# Patient Record
Sex: MALE | Employment: OTHER | URBAN - METROPOLITAN AREA
[De-identification: names, ages, dates, MRNs, and addresses within clinical notes are randomized per-mention and may not be internally consistent; named-entity substitution may affect disease eponyms.]

---

## 2017-03-22 ENCOUNTER — TELEPHONE (OUTPATIENT)
Dept: CARDIAC REHAB | Age: 82
End: 2017-03-22

## 2017-03-22 NOTE — TELEPHONE ENCOUNTER
Pulmonary Rehab called patient and spoke to wife about program. She's interested for her  and wants him in the program as soon as possible.     Thank you,  Sidney Sow

## 2017-05-01 ENCOUNTER — TELEPHONE (OUTPATIENT)
Dept: PULMONOLOGY | Age: 82
End: 2017-05-01

## 2017-05-04 ENCOUNTER — HOSPITAL ENCOUNTER (OUTPATIENT)
Dept: PULMONOLOGY | Age: 82
Discharge: HOME | End: 2017-05-04
Payer: MEDICARE

## 2017-05-04 PROCEDURE — G0424 PULMONARY REHAB W EXER: HCPCS

## 2017-05-09 ENCOUNTER — HOSPITAL ENCOUNTER (OUTPATIENT)
Dept: PULMONOLOGY | Age: 82
Discharge: HOME | End: 2017-05-09
Payer: MEDICARE

## 2017-05-09 PROCEDURE — G0424 PULMONARY REHAB W EXER: HCPCS

## 2017-05-09 NOTE — PROGRESS NOTES
[facility]  Outpatient Pulmonary Rehabilitation Flowsheet  [id number] [address] Serr[address] F Weeks  5/13/1929       Patient's carry-over of treatment delivered by: first treatment day    Objective Daily Findings    Comments:    Respiratory Muscle Functioning/Exercise Conditioning/Strengthening Session:    Time In: 8268  Time Out::0130  Session Number: 2  O2 with Theapy: 0 L/min    Pre SPO2 95  Pre HR:60  Pre BP: 98/60    RR: 15  Wt: not taken by pt  Temp: not taken by pt   Post SPO2: 95 Post HR: 57  Post BP: 114/63    Self Care Home Management Instruction/Education    Self Care Home Management Instruction Education: Breathing Retaining and COPD & Lung Disease. Patient's Response to Education: Patient actively participated in education and Able to demonstrate. Comments: Individual Therapy               Goal     Actual                      During Therapy                 Post-Therapy     % SpO2 HR RPD 1 - 4 RPE 6-20 Pain  1 - 10 % SpO2 HR RPD 1 - 4 RPE 6-20 Pain 0 - 10   Stretching/DB/  Monitoring 15 min 15 min 95 59 1 6 1 95 57 1 6 1   IS 1750 ml  25 min 9067-6562  25 min 94 57 1 7 1 95 57 1 6 1   IMT               Arm Bike 25 villatoro  25 min 15 villatoro  20 min 94 63 1 13 1 95 58 1 6 1   Weighted DB                 Patient's Response to Therapy: Increase fatique, Good effort and Good motivation.   Comments:    Time In: 0130     Session Number:3  Time Out: 0205     O2 with therapy: 0 L/min         Individual Therapy               Goal     Actual                      During Therapy           Post Therapy     % SpO2 HR RPD 1 - 4 RPE 6-20 Pain  1 - 10 % SpO2 HR RPD 1 - 4 RPE 6-20 Pain 0 - 10   Stepper L5  30 min L2-20 min  L1-5 min 95 68 1 10 4 94 62 1 6 2   Treadmill               Leg Bike               Stretching/DB/  Monitoring 10 min 10 min 94 62 1 6 1 95 57 1 6 1                       Patient's response to therapy: Increase fatique, Increase pain: Location: tail bone, Good effort and Good motivation  Comment:s      Assessment    Patient's response: Patient progressing towardsd LTGs evident by: Increased PLB.     Plan: Continue as written    Code     Billin units      Physician supervision provided this date of service by: Dr Betty Zarco     Therapist Signature: RT Susy    Therapist PRINTED Name and Credential: RT Susy    2017  1:07 PM

## 2017-05-11 ENCOUNTER — HOSPITAL ENCOUNTER (OUTPATIENT)
Dept: PULMONOLOGY | Age: 82
Discharge: HOME | End: 2017-05-11
Payer: MEDICARE

## 2017-05-11 PROCEDURE — G0424 PULMONARY REHAB W EXER: HCPCS

## 2017-05-11 NOTE — PROGRESS NOTES
DR[facility]  Outpatient Pulmonary Rehabilitation Flowsheet  4861 1227 [facility]  5/13/1929       Patient's carry-over of treatment delivered by: PLB while exercising with max cues. Objective Daily Findings    Comments:    Respiratory Muscle Functioning/Exercise Conditioning/Strengthening Session:    Time In: 1230  Time Out::0130  Session Number: 4  O2 with Theapy: 0 L/min    Pre SPO2 96  Pre HR:60  Pre BP: 103/58    RR: 14 Wt: 130  Temp: 97.2   Post SPO2: 96 Post HR: 57  Post BP: 95/53    Self Care Home Management Instruction/Education    Self Care Home Management Instruction Education: Breathing Retaining, Collaborative Self Managment and COPD & Lung Disease. Patient's Response to Education: Patient actively participated in education and Able to demonstrate. Comments: Individual Therapy               Goal     Actual                      During Therapy                 Post-Therapy     % SpO2 HR RPD 1 - 4 RPE 6-20 Pain  1 - 10 % SpO2 HR RPD 1 - 4 RPE 6-20 Pain 0 - 10   Stretching/DB/  Monitoring 10 min 10 min 95 69 1 6 1 95 62 1 6 1   IS 1750 ml  25 min 1567-1281  25 min 94 64 1 7 1 94 63 1 6 1   IMT               Arm Bike 25 villatoro  25 min 15 villatoro  25 min 95 75 1 14 1 95 69 1 6 1   Weighted DB                 Patient's Response to Therapy: Increase fatique, Good effort and Good motivation.   Comments:    Time In: 0130     Session Number:5  Time Out: 0205     O2 with therapy: 0 L/min         Individual Therapy               Goal     Actual                      During Therapy           Post Therapy     % SpO2 HR RPD 1 - 4 RPE 6-20 Pain  1 - 10 % SpO2 HR RPD 1 - 4 RPE 6-20 Pain 0 - 10   Stepper               Treadmill               Leg Bike L2  30 min L2-15 min  L1-15 min 93 82 2 15 2 96 61 1 6 1   Stretching/DB/  Monitoring 5 min 5 min 96 61 1 6 1 96 57 1 6 1                       Patient's response to therapy: Increase dyspnea, Increase fatique, Increase heart rate, Good effort and Good motivation  Comment:s      Assessment    Patient's response: Patient progressing towardsd LTGs evident by: Increased PLB.     Plan: Continue as written    Code     Billin units      Physician supervision provided this date of service by: Dr Danna Tariq     Therapist Signature: RT Mandy    Therapist PRINTED Name and Credential: RT Mandy    2017  8:53 AM

## 2017-05-16 ENCOUNTER — HOSPITAL ENCOUNTER (OUTPATIENT)
Dept: PULMONOLOGY | Age: 82
Discharge: HOME | End: 2017-05-16
Payer: MEDICARE

## 2017-05-16 ENCOUNTER — APPOINTMENT (OUTPATIENT)
Dept: PULMONOLOGY | Age: 82
End: 2017-05-16
Payer: MEDICARE

## 2017-05-16 PROCEDURE — G0424 PULMONARY REHAB W EXER: HCPCS

## 2017-05-16 NOTE — PROGRESS NOTES
[facility]  Outpatient Pulmonary Rehabilitation Flowsheet  [phone] [facility]  5/13/1929       Patient's carry-over of treatment delivered by: PLB while exercising with max cues. Objective Daily Findings    Comments:    Respiratory Muscle Functioning/Exercise Conditioning/Strengthening Session:    Time In: 1230  Time Out::0130  Session Number: 6  O2 with Theapy: 0 L/min    Pre SPO2 96  Pre HR:62  Pre BP: 98/59    RR: 14  Wt: 131  Temp: 97.0   Post SPO2: 95 Post HR: 60  Post BP: 106/52    Self Care Home Management Instruction/Education    Self Care Home Management Instruction Education: Breathing Retaining and Body Mechanics & Energy Conversation/Work Simplification (EC/WS) Technique. Patient's Response to Education: Patient actively participated in education and Able to demonstrate. Comments: Individual Therapy               Goal     Actual                      During Therapy                 Post-Therapy     % SpO2 HR RPD 1 - 4 RPE 6-20 Pain  1 - 10 % SpO2 HR RPD 1 - 4 RPE 6-20 Pain 0 - 10   Stretching/DB/  Monitoring 10 min 10 min 96 67 1 6 1 95 62 1 6 1   IS 1750 ml  25 min 1500 ml  25 min 94 63 1 7 1 95 63 1 6 1   IMT               Arm Bike 25 villatoro  25 min 15 villatoro  25 min 95 65 1 13 1 96 67 1 6 1   Weighted DB                 Patient's Response to Therapy: Increase fatique, Good effort and Good motivation.   Comments:    Time In: 0130     Session Number:7  Time Out: 0210     O2 with therapy: 0 L/min         Individual Therapy               Goal     Actual                      During Therapy           Post Therapy     % SpO2 HR RPD 1 - 4 RPE 6-20 Pain  1 - 10 % SpO2 HR RPD 1 - 4 RPE 6-20 Pain 0 - 10   Stepper               Treadmill               Leg Bike L2  30 min L2-20 min  L1-10 min 95 86 1 14 1 95 78 1 6 1   Stretching/DB/  Monitoring 10 min 10 min 95 78 1 6 1 95 60 1 6 1   Rest/PLB                    Patient's response to therapy: Increase fatique, Increase heart rate, Good effort and Good motivation  Comment:s      Assessment    Patient's response: Patient progressing towardsd LTGs evident by: Increased PLB and Improved Aerobic capicity and endurance.     Plan: Continue as written    Code     Billin units      Physician supervision provided this date of service by: Dr Lindsay Mckeon     Therapist Signature: Barton Ormond, RT    Therapist PRINTED Name and Credential: Barton Ormond, RT    2017  11:27 AM

## 2017-05-18 ENCOUNTER — APPOINTMENT (OUTPATIENT)
Dept: PULMONOLOGY | Age: 82
End: 2017-05-18
Payer: MEDICARE

## 2017-05-23 ENCOUNTER — HOSPITAL ENCOUNTER (OUTPATIENT)
Dept: PULMONOLOGY | Age: 82
Discharge: HOME | End: 2017-05-23
Payer: MEDICARE

## 2017-05-23 ENCOUNTER — APPOINTMENT (OUTPATIENT)
Dept: PULMONOLOGY | Age: 82
End: 2017-05-23
Payer: MEDICARE

## 2017-05-23 PROCEDURE — G0424 PULMONARY REHAB W EXER: HCPCS

## 2017-05-23 NOTE — PROGRESS NOTES
DR[facility]  Outpatient Pulmonary Rehabilitation Flowsheet  4580 1227 [facility]  5/13/1929       Patient's carry-over of treatment delivered by: PLB while exercising with max cues. Objective Daily Findings    Comments:    Respiratory Muscle Functioning/Exercise Conditioning/Strengthening Session:    Time In: 1230  Time Out::0130  Session Number: 8  O2 with Theapy: 0 L/min    Pre SPO2 96  Pre HR:59  Pre BP: 111/53    RR: 15  Wt: not taken by pt  Temp: not taken by pt   Post SPO2: 96 Post HR: 58  Post BP: 104/55    Self Care Home Management Instruction/Education    Self Care Home Management Instruction Education: Breathing Retaining, Exercise Concepts and Collaborative Self Managment. Patient's Response to Education: Patient actively participated in education and Able to demonstrate. Comments: Individual Therapy               Goal     Actual                      During Therapy                 Post-Therapy     % SpO2 HR RPD 1 - 4 RPE 6-20 Pain  1 - 10 % SpO2 HR RPD 1 - 4 RPE 6-20 Pain 0 - 10   Stretching/DB/  Monitoring 10 min 10 min 95 64 1 6 1 96 59 1 6 1   IS 1750 ml  25 min 1500 ml  25 min 95 63 1 7 1 96 61 1 6 1   IMT               Arm Bike 25 villatoro  25 min 15 villatoro  25 min 96 68 1 12 1 95 64 1 6 1   Weighted DB                 Patient's Response to Therapy: Increase fatique, Good effort and Good motivation.   Comments:    Time In: 0130     Session Number:9  Time Out: 0210     O2 with therapy: 0 L/min         Individual Therapy               Goal     Actual                      During Therapy           Post Therapy     % SpO2 HR RPD 1 - 4 RPE 6-20 Pain  1 - 10 % SpO2 HR RPD 1 - 4 RPE 6-20 Pain 0 - 10   Stepper L5  30 min L3  30 min 94 77 1 13 1 96 68 1 6 1   Treadmill               Leg Bike               Stretching/DB/  Monitoring 10 min 10 min 96 68 1 6 1 96 58 1 6 1                       Patient's response to therapy: Increase fatique, Increase heart rate, Good effort and Good motivation  Comment:s      Assessment    Patient's response: Patient progressing towardsd LTGs evident by: Increased PLB and Improved Aerobic capicity and endurance.     Plan: Continue as written    Code     Billin units      Physician supervision provided this date of service by: Dr Zaire Silver     Therapist Signature: RT Jarek    Therapist PRINTED Name and Credential: RT Jarek    2017  11:13 AM

## 2017-05-25 ENCOUNTER — APPOINTMENT (OUTPATIENT)
Dept: PULMONOLOGY | Age: 82
End: 2017-05-25
Payer: MEDICARE

## 2017-05-25 ENCOUNTER — HOSPITAL ENCOUNTER (OUTPATIENT)
Dept: PULMONOLOGY | Age: 82
Discharge: HOME | End: 2017-05-25
Payer: MEDICARE

## 2017-05-25 PROCEDURE — G0424 PULMONARY REHAB W EXER: HCPCS

## 2017-05-25 NOTE — PROGRESS NOTES
[facility]  Outpatient Pulmonary Rehabilitation Flowsheet  4292 1227 [facility]  5/13/1929       Patient's carry-over of treatment delivered by: PLB while exercising with max cues. Objective Daily Findings    Comments:    Respiratory Muscle Functioning/Exercise Conditioning/Strengthening Session:    Time In: 1230  Time Out::0130  Session Number: 10  O2 with Theapy: 0 L/min    Pre SPO2 95  Pre HR:60  Pre BP: 103/63    RR: 16  Wt: 130  Temp: 96.7   Post SPO2: 96 Post HR: 58  Post BP: 109/64    Self Care Home Management Instruction/Education    Self Care Home Management Instruction Education: Breathing Retaining. Patient's Response to Education: Patient actively participated in education and Able to demonstrate. Comments: Individual Therapy               Goal     Actual                      During Therapy                 Post-Therapy     % SpO2 HR RPD 1 - 4 RPE 6-20 Pain  1 - 10 % SpO2 HR RPD 1 - 4 RPE 6-20 Pain 0 - 10   Stretching/DB/  Monitoring 15 min 15 min 98 57 1 6 1 97 59 1 6 1   IS 1750 ml  25 min 1500 ml  20 min 96 63 1 7 1 95 61 1 6 1   IMT               Arm Bike 25 villatoro  25 min 20w-5  15w-20 96 68 1 15 1 98 57 1 6 1   Weighted DB                 Patient's Response to Therapy: Increase fatique, Good effort and Good motivation.   Comments:    Time In: 0130     Session Number:11  Time Out: 0205     O2 with therapy: 0 L/min         Individual Therapy               Goal     Actual                      During Therapy           Post Therapy     % SpO2 HR RPD 1 - 4 RPE 6-20 Pain  1 - 10 % SpO2 HR RPD 1 - 4 RPE 6-20 Pain 0 - 10   Stepper               Treadmill               Leg Bike L2  30 min L2-25 min  L1-5 min 93 87 1 12 1 95 61 1 6 1   Stretching/DB/  Monitoring 5 min 5 min 95 61 1 6 1 96 58 1 6 1                       Patient's response to therapy: Increase fatique and Increase Heart Rate, Good effort and Good motivation  Comment:s Assessment    Patient's response: Patient progressing towardsd LTGs evident by: Increased PLB and Improved Aerobic capicity and endurance.     Plan: Continue as written    Code     Billin units      Physician supervision provided this date of service by: Dr Madeleine Michelle     Therapist Signature: RT Jem    Therapist PRINTED Name and Credential: RT Jem    2017  10:19 AM

## 2017-05-30 ENCOUNTER — APPOINTMENT (OUTPATIENT)
Dept: PULMONOLOGY | Age: 82
End: 2017-05-30
Payer: MEDICARE

## 2017-05-30 ENCOUNTER — HOSPITAL ENCOUNTER (OUTPATIENT)
Dept: PULMONOLOGY | Age: 82
Discharge: HOME | End: 2017-05-30
Payer: MEDICARE

## 2017-05-30 PROCEDURE — G0424 PULMONARY REHAB W EXER: HCPCS

## 2017-05-30 NOTE — PROGRESS NOTES
[facility]  Outpatient Pulmonary Rehabilitation Flowsheet  2762 1227 [facility]  5/13/1929       Patient's carry-over of treatment delivered by: PLB while exercising with max cues. Objective Daily Findings    Comments:    Respiratory Muscle Functioning/Exercise Conditioning/Strengthening Session:    Time In: 1230  Time Out::0130  Session Number: 12  O2 with Theapy: 0 L/min    Pre SPO2 94  Pre HR:59  Pre BP: 118/67    RR: 16  Wt: not recorded by pt  Temp: 94.0   Post SPO2: 99 Post HR: 53  Post BP: 109/53    Self Care Home Management Instruction/Education    Self Care Home Management Instruction Education: Breathing Retaining, Body Mechanics & Energy Conversation/Work Simplification (EC/WS) Technique and COPD & Lung Disease. Patient's Response to Education: Patient actively participated in education and Able to demonstrate. Comments: Individual Therapy               Goal     Actual                      During Therapy                 Post-Therapy     % SpO2 HR RPD 1 - 4 RPE 6-20 Pain  1 - 10 % SpO2 HR RPD 1 - 4 RPE 6-20 Pain 0 - 10   Stretching/DB/  Monitoring 15 min 15 min 94 59 1 6 1 95 57 1 6 1   IS 1750 ml  20 min 2903-0994  20 min 95 60 1 7 1 95 59 1 6 1   IMT               Arm Bike 25 villatoro  25 min 20w-5  15w-20 97 62 2 14 1 94 59 1 6 1   Weighted DB                 Patient's Response to Therapy: Increase dyspnea, Increase fatique, Good effort and Good motivation.   Comments:    Time In: 0130     Session Number:13  Time Out: 0205     O2 with therapy: 0 L/min         Individual Therapy               Goal     Actual                      During Therapy           Post Therapy     % SpO2 HR RPD 1 - 4 RPE 6-20 Pain  1 - 10 % SpO2 HR RPD 1 - 4 RPE 6-20 Pain 0 - 10   Stepper L5  30 min L4-20 min  L3-10 min 98 55 1 10 1 99 53 1 6 1   Treadmill               Leg Bike               Stretching/DB/  Monitoring 5 min 5 min 99 53 1 6 1 99 53 1 6 1 Patient's response to therapy: Increase fatique, Good effort and Good motivation  Comment:s      Assessment    Patient's response: Patient progressing towardsd LTGs evident by: Improved Aerobic capicity and endurance.     Plan: Continue as written    Code     Billin units      Physician supervision provided this date of service by: Dr Pam Mills     Therapist Signature: RT Carl    Therapist PRINTED Name and Credential: RT Carl    2017  2:15 PM

## 2017-06-01 ENCOUNTER — HOSPITAL ENCOUNTER (OUTPATIENT)
Dept: PULMONOLOGY | Age: 82
Discharge: HOME | End: 2017-06-01
Payer: MEDICARE

## 2017-06-01 ENCOUNTER — APPOINTMENT (OUTPATIENT)
Dept: PULMONOLOGY | Age: 82
End: 2017-06-01
Payer: MEDICARE

## 2017-06-01 PROCEDURE — G0424 PULMONARY REHAB W EXER: HCPCS

## 2017-06-01 NOTE — PROGRESS NOTES
[facility]  Outpatient Pulmonary Rehabilitation Flowsheet  [id number] 2494 [facility]  5/13/1929       Patient's carry-over of treatment delivered by: Pt reports that he feels the program is good for him. .    Objective Daily Findings    Comments:    Respiratory Muscle Functioning/Exercise Conditioning/Strengthening Session:    Time In: 1230  Time Out::0130  Session Number: 14  O2 with Theapy: 0 L/min    Pre SPO2 96  Pre HR:63  Pre BP: 111/66    RR: 15  Wt: 130  Temp: 97.1   Post SPO2: 99 Post HR: 58  Post BP: 109/65    Self Care Home Management Instruction/Education    Self Care Home Management Instruction Education: Breathing Retaining and COPD & Lung Disease. Patient's Response to Education: Patient actively participated in education and Able to demonstrate. Comments: Individual Therapy               Goal     Actual                      During Therapy                 Post-Therapy     % SpO2 HR RPD 1 - 4 RPE 6-20 Pain  1 - 10 % SpO2 HR RPD 1 - 4 RPE 6-20 Pain 0 - 10   Stretching/DB/  Monitoring 15 min 15 min 95 61 1 6 1 94 62 1 6 1   IS 1750 ml  20 min 3381-1672  20 min 95 63 1 7 1 95 61 1 6 1   IMT               Arm Bike 25 villatoro  25 min 20w-10  15w-15 96 62 2 16 1 94 60 1 6 1   Weighted DB                 Patient's Response to Therapy: Increase dyspnea, Increase fatique, Good effort and Good motivation.   Comments:    Time In: 0130     Session Number:15  Time Out: 0205     O2 with therapy: 0 L/min         Individual Therapy               Goal     Actual                      During Therapy           Post Therapy     % SpO2 HR RPD 1 - 4 RPE 6-20 Pain  1 - 10 % SpO2 HR RPD 1 - 4 RPE 6-20 Pain 0 - 10   Stepper               Treadmill               Leg Bike L2  30 min L2-25 min  L1-5 min 93 83 1 13 1 97 65 1 6 1   Stretching/DB/  Monitoring 5 min 5 min 97 65 1 6 1 99 58 1 6 1                       Patient's response to therapy: Increase fatique, Good effort and Good motivation  Comment:s      Assessment    Patient's response: Patient progressing towardsd LTGs evident by: Improved Aerobic capicity and endurance.     Plan: Continue as written    Code     Billin units      Physician supervision provided this date of service by: Dr Karen Lobato     Therapist Signature: RT Nestor    Therapist PRINTED Name and Credential: RT Nestor    2017  10:56 AM

## 2017-06-06 ENCOUNTER — HOSPITAL ENCOUNTER (OUTPATIENT)
Dept: PULMONOLOGY | Age: 82
End: 2017-06-06
Payer: MEDICARE

## 2017-06-06 ENCOUNTER — APPOINTMENT (OUTPATIENT)
Dept: PULMONOLOGY | Age: 82
End: 2017-06-06
Payer: MEDICARE

## 2017-06-08 ENCOUNTER — APPOINTMENT (OUTPATIENT)
Dept: PULMONOLOGY | Age: 82
End: 2017-06-08
Payer: MEDICARE

## 2017-06-13 ENCOUNTER — HOSPITAL ENCOUNTER (OUTPATIENT)
Dept: PULMONOLOGY | Age: 82
Discharge: HOME | End: 2017-06-13
Payer: MEDICARE

## 2017-06-13 ENCOUNTER — APPOINTMENT (OUTPATIENT)
Dept: PULMONOLOGY | Age: 82
End: 2017-06-13
Payer: MEDICARE

## 2017-06-13 PROCEDURE — G0424 PULMONARY REHAB W EXER: HCPCS

## 2017-06-13 NOTE — PROGRESS NOTES
[facility]  Outpatient Pulmonary Rehabilitation Flowsheet  8522 1227 [facility]  5/13/1929       Patient's carry-over of treatment delivered by: Improved PLB while exercising. Objective Daily Findings    Comments:    Respiratory Muscle Functioning/Exercise Conditioning/Strengthening Session:    Time In: 1230  Time Out::0130  Session Number: 16  O2 with Theapy: 0 L/min    Pre SPO2 97  Pre HR:63  Pre BP: 109/69    RR: 16  Wt: 129  Temp: 97.4   Post SPO2: 97 Post HR: 64  Post BP: 94/55    Self Care Home Management Instruction/Education    Self Care Home Management Instruction Education: Breathing Retaining and Importance of Water. Patient's Response to Education: Patient actively participated in education and Able to demonstrate. Comments: Individual Therapy               Goal     Actual                      During Therapy                 Post-Therapy     % SpO2 HR RPD 1 - 4 RPE 6-20 Pain  1 - 10 % SpO2 HR RPD 1 - 4 RPE 6-20 Pain 0 - 10   Stretching/DB/  Monitoring 15 min 15 min 96 63 1 6 1 97 64 1 6 1   IS               IMT               Arm Bike 25 villatoro  25 min 20w-10  15w-20 94 62 1 13 1 95 64 1 6 1   Sit to Stands 15 min 15 min 93 63 1 13 1 94 63 1 6 1     Patient's Response to Therapy: Increase fatique, Good effort and Good motivation.   Comments:    Time In: 0130     Session Number:17  Time Out: 0205     O2 with therapy: 0 L/min         Individual Therapy               Goal     Actual                      During Therapy           Post Therapy     % SpO2 HR RPD 1 - 4 RPE 6-20 Pain  1 - 10 % SpO2 HR RPD 1 - 4 RPE 6-20 Pain 0 - 10   Stepper               Treadmill 1.5 mph  30 min 1.2 mph  30 min 92 79 1 11 1 96 72 1 6 1   Leg Bike               Stretching/DB/  Monitoring 5 min 5 min 96 72 1 6 1 95 65 1 6 1                       Patient's response to therapy: Increase fatique, Increase heart rate, Good effort and Good motivation  Comment:s Assessment    Patient's response: Patient progressing towardsd LTGs evident by: Increased PLB.     Plan: Continue as written    Code     Billin units      Physician supervision provided this date of service by: Dr Anahi Lucas     Therapist Signature: RT Mitzy    Therapist PRINTED Name and Credential: RT Mitzy    2017  12:27 PM

## 2017-06-15 ENCOUNTER — APPOINTMENT (OUTPATIENT)
Dept: PULMONOLOGY | Age: 82
End: 2017-06-15
Payer: MEDICARE

## 2017-06-15 ENCOUNTER — HOSPITAL ENCOUNTER (OUTPATIENT)
Dept: PULMONOLOGY | Age: 82
Discharge: HOME | End: 2017-06-15
Payer: MEDICARE

## 2017-06-15 PROCEDURE — G0424 PULMONARY REHAB W EXER: HCPCS

## 2017-06-15 NOTE — PROGRESS NOTES
DR[facility]  Outpatient Pulmonary Rehabilitation Flowsheet  8718 1227 [facility]  5/13/1929       Patient's carry-over of treatment delivered by: none noted today. Objective Daily Findings    Comments:    Respiratory Muscle Functioning/Exercise Conditioning/Strengthening Session:    Time In: 1230  Time Out::0130  Session Number: 18  O2 with Theapy: 0 L/min    Pre SPO2 98  Pre HR:64  Pre BP: 116/61    RR: 15  Wt: 130  Temp: 97.7   Post SPO2: 98 Post HR: 60  Post BP: 109/58    Self Care Home Management Instruction/Education    Self Care Home Management Instruction Education: Breathing Retaining and Body Mechanics & Energy Conversation/Work Simplification (EC/WS) Technique. Patient's Response to Education: Patient actively participated in education and Able to demonstrate. Comments: Individual Therapy               Goal     Actual                      During Therapy                 Post-Therapy     % SpO2 HR RPD 1 - 4 RPE 6-20 Pain  1 - 10 % SpO2 HR RPD 1 - 4 RPE 6-20 Pain 0 - 10   Stretching/DB/  Monitoring 10 min 10 min 94 64 1 6 1 95 65 1 6 1   IS 2000 ml  20 min 0935-2573  20 min 98 64 1 7 1 97 65 1 6 1   IMT               Arm Bike 25 villatoro  25 min 20w-15  15w-15 95 64 1 13 1 94 64 1 6 1   Weighted DB                 Patient's Response to Therapy: Increase fatique, Good effort and Good motivation.   Comments:    Time In: 0130     Session Number:19  Time Out: 0205     O2 with therapy: 0 L/min         Individual Therapy               Goal     Actual                      During Therapy           Post Therapy     % SpO2 HR RPD 1 - 4 RPE 6-20 Pain  1 - 10 % SpO2 HR RPD 1 - 4 RPE 6-20 Pain 0 - 10   Stepper               Treadmill               Leg Bike L3  30 min L3-15 min  L2-15 min 98 86 1 13 1 96 70 1 6 1   Stretching/DB/  Monitoring 5 min 5 min 96 70 1 6 1 98 60 1 6 1                       Patient's response to therapy: Increase fatique, Increase heart rate, Good effort and Good motivation  Comment:s      Assessment    Patient's response: Patient progressing towardsd LTGs evident by: Increased PLB and Improved Aerobic capicity and endurance.     Plan: Continue as written    Code     Billin units      Physician supervision provided this date of service by: Dr Coe Single     Therapist Signature: RT Christy    Therapist PRINTED Name and Credential: RT Christy    6/15/2017  9:42 AM

## 2017-06-20 ENCOUNTER — HOSPITAL ENCOUNTER (OUTPATIENT)
Dept: PULMONOLOGY | Age: 82
Discharge: HOME | End: 2017-06-20
Payer: MEDICARE

## 2017-06-20 ENCOUNTER — APPOINTMENT (OUTPATIENT)
Dept: PULMONOLOGY | Age: 82
End: 2017-06-20
Payer: MEDICARE

## 2017-06-20 PROCEDURE — G0424 PULMONARY REHAB W EXER: HCPCS

## 2017-06-20 NOTE — PROGRESS NOTES
[facility]  Outpatient Pulmonary Rehabilitation Flowsheet  [id number] Ayana Serrano F Weeks  5/13/1929       Patient's carry-over of treatment delivered by: none noted today    Objective Daily Findings    Comments:    Respiratory Muscle Functioning/Exercise Conditioning/Strengthening Session:    Time In: 1230  Time Out::0130  Session Number: 20  O2 with Theapy: 0 L/min    Pre SPO2 96  Pre HR:67  Pre BP: 112/62    RR: 15 Wt: 127  Temp: 97.3   Post SPO2: 97 Post HR: 58  Post BP: 104/57    Self Care Home Management Instruction/Education    Self Care Home Management Instruction Education: Breathing Retaining and Body Mechanics & Energy Conversation/Work Simplification (EC/WS) Technique. Patient's Response to Education: Patient actively participated in education and Able to demonstrate. Comments: Individual Therapy               Goal     Actual                      During Therapy                 Post-Therapy     % SpO2 HR RPD 1 - 4 RPE 6-20 Pain  1 - 10 % SpO2 HR RPD 1 - 4 RPE 6-20 Pain 0 - 10   Stretching/DB/  Monitoring 10 min 10 min 97 65 1 6 1 96 64 1 6 1   IS 2000 ml  20 min 7789-6947  20 min 96 67 1 7 1 97 65 1 6 1   IMT               Arm Bike 25 villatoro  25 min 20w-20  15w-10 96 81 1 13 1 97 65 1 6 1   Weighted DB                 Patient's Response to Therapy: Increase fatique, Increase heart rate, Good effort and Good motivation.   Comments:    Time In: 0130     Session Number:21  Time Out: 0205     O2 with therapy: 0 L/min         Individual Therapy               Goal     Actual                      During Therapy           Post Therapy     % SpO2 HR RPD 1 - 4 RPE 6-20 Pain  1 - 10 % SpO2 HR RPD 1 - 4 RPE 6-20 Pain 0 - 10   Stepper L5  30 min L5-15 min  L4-15 min 93 77 1 13 1 97 62 1 6 1   Treadmill               Leg Bike               Stretching/DB/  Monitoring 5 min 5 min 97 62 1 6 1 97 58 1 6 1                       Patient's response to therapy: Increase fatique, Increase heart rate, Good effort and Good motivation  Comment:s      Assessment    Patient's response: Patient progressing towardsd LTGs evident by: Increased Pacing and Improved Aerobic capicity and endurance.     Plan: Continue as written    Code     Billin units      Physician supervision provided this date of service by: Dr Melissa Posadas     Therapist Signature: RT Ruth    Therapist PRINTED Name and Credential: RT uRth    2017  4:25 PM

## 2017-06-21 ENCOUNTER — HOSPITAL ENCOUNTER (OUTPATIENT)
Dept: PHYSICAL THERAPY | Age: 82
Discharge: HOME | End: 2017-06-21
Payer: MEDICARE

## 2017-06-21 PROCEDURE — G8982 BODY POS GOAL STATUS: HCPCS

## 2017-06-21 PROCEDURE — 97110 THERAPEUTIC EXERCISES: CPT

## 2017-06-21 PROCEDURE — G8981 BODY POS CURRENT STATUS: HCPCS

## 2017-06-21 PROCEDURE — 97162 PT EVAL MOD COMPLEX 30 MIN: CPT

## 2017-06-21 NOTE — PROGRESS NOTES
PT DAILY TREATMENT NOTE - [facility] 3-16    Patient Name: Aubree Doran  Date:2017  : 1929  [x]  Patient  Verified  Payor: [place] MED[place]ARE / Plan: [place] MEDICARE PART A & B / Product Type: Medicare /    In time:12:20  Out time:1:00  Total Treatment Time (min): 40  Total Timed Codes (min): 15  1:1 Treatment Time ( only): 40   Visit #: 1 of 10    Treatment Area: Cervical pain [M54.2]    SUBJECTIVE  Pain Level (0-10 scale): 0/10  Any medication changes, allergies to medications, adverse drug reactions, diagnosis change, or new procedure performed?: [x] No    [] Yes (see summary sheet for update)  Subjective functional status/changes:   [x] See paper initial evaluation form in patient chart      OBJECTIVE    25 min [x]Eval                  []Re-Eval     15 min Therapeutic Exercise:  [] See flow sheet : HEP given and reviewed with Pt   Rationale: increase ROM to improve the patients ability to hold head in more erect position          With   [x] TE   [] TA   [] neuro   [] other: Patient Education: [x] Review HEP    [] Progressed/Changed HEP based on:   [] positioning   [] body mechanics   [] transfers   [] heat/ice application    [] other:      Other Objective/Functional Measures: FOTO 48 pts     Pain Level (0-10 scale) post treatment: 0/10    ASSESSMENT/Changes in Function:     Patient will continue to benefit from skilled PT services to address functional mobility deficits, address ROM deficits, analyze and address soft tissue restrictions, analyze and cue movement patterns, analyze and modify body mechanics/ergonomics, assess and modify postural abnormalities and instruct in home and community integration to attain remaining goals.      [x]  See Plan of Care         PLAN  []  Upgrade activities as tolerated     [x]  Continue plan of care  []  Update interventions per flow sheet       []  Discharge due to:_  []  Other:_      Kisha Rao, PT 2017  1:31 PM

## 2017-06-21 NOTE — PROGRESS NOTES
[facility] - [facility], [address] [place] [address]  [phone] fax  Plan of Care/ Statement of Necessity for Physical Therapy Services    Patient name: Courtney Cottrell Start of Care: 2017   Referral source: Krista Nash DO : 1929    Medical Diagnosis: Cervical pain [M54.2]   Onset Date:17    Treatment Diagnosis: Torticollis, Neck Pain   Prior Hospitalization: see medical history Provider#: [id number]   Medications: Verified on Patient summary List    Comorbidities: Heart disease; Arthirits; Osteoporosis; Thyroid problems; COPD; visually impaired - glasses; hearing impaired - hearing aids; hx of skin CA   Prior Level of Function: Lives with spouse; retired; manages household, yardwork with spouse      The Plan of Care and following information is based on the information from the initial evaluation. Assessment/ key information: Pt is a 80 y.o. male who presents with c/o intermittent neck pain and significant decrease in cervical mobility. Pt began noticing insidious onset of worsening neck AROM and increased pain > 6 months ago, prompting Pt to seek medical attention. Pt sits, stands with flexed FWD head and rounded shoulders. Pt holds head at 40 deg Flex and exhibits limited C/S AROM of Ext -5 deg from spinal neutral; SBR/SBL 15 deg; RR/RL 10 deg. Soft tissue restrictions noted in all cervical musculature and Pec Mjr. Full B shoulder AROM, strength noted. Pt would benefit from skilled PT to address above deficits to improve Pt's posture and function. Evaluation Complexity History HIGH Complexity :3+ comorbidities / personal factors will impact the outcome/ POC ; Examination MEDIUM Complexity : 3 Standardized tests and measures addressing body structure, function, activity limitation and / or participation in recreation  ;Presentation MEDIUM Complexity : Evolving with changing characteristics  ; Clinical Decision Making MEDIUM Complexity : FOTO score of 26-74  Overall Complexity Rating: MEDIUM  Problem List: pain affecting function, decrease ROM, decrease strength, decrease ADL/ functional abilitiies, decrease activity tolerance and decrease flexibility/ joint mobility   Treatment Plan may include any combination of the following: Therapeutic exercise, Therapeutic activities, Neuromuscular re-education, Physical agent/modality, Manual therapy and Patient education  Patient / Family readiness to learn indicated by: asking questions, trying to perform skills and interest  Persons(s) to be included in education: patient (P) and family support person (FSP);list spouse  Barriers to Learning/Limitations: yes;  sensory deficits-vision/hearing/speech and altered mental status (i.e.Sedation, Confusion)  Patient Goal (s): Increased range of motion; ability to lift head up farther.   Patient Self Reported Health Status: fair  Rehabilitation Potential: good    Short Term Goals: To be accomplished in 1 weeks:  Goal: Pt to be compliant with initial HEP to assist with improving head/neck posture. Status at last note/certification: Established    Long Term Goals: To be accomplished in 5 weeks:  Goal: Pt to increase C/S AROM all planes by at least 5 deg to achieve neutral head/neck posture. Status at last note/certification: Flex 40 deg; Ext -5 deg; SBR/SBL 15 deg; RR/RL 10 deg - all with head flexed at 40 deg  Goal: Pt to be able to sleep through the night on one pillow without increased pain to assist with neutral posture. Status at last note/certification: sleeps with two thick pillows, head fully flexed  Goal: Pt to report < 2/10 pain at worst to be able to look up or turn head with ease when performing daily tasks. Status at last note/certification: 7/71 pain at worst  Goal: Pt to report FOTO score of 60 pts to show improved posture, function.   Status at last note/certification: FOTO 48 pts at eval    Frequency / Duration: Patient to be seen 2 times per week for 5 weeks. Patient/ Caregiver education and instruction: Diagnosis, prognosis, exercises   [x]  Plan of care has been reviewed with СВЕТЛАНА    G-Codes (GP)  Position   Current  CK= 40-59%   Goal  CK= 40-59%    The severity rating is based on clinical judgment and the FOTO score. Certification Period: 6/21/17 - 7/20/17    Kisha Rao, PT 6/21/2017 1:33 PM  _____________________________________________________________________  I certify that the above Therapy Services are being furnished while the patient is under my care. I agree with the treatment plan and certify that this therapy is necessary.     Physician's Signature:____________________  Date:__________Time:______    Please sign and return to [facility] - [address]  [phone] fax

## 2017-06-22 ENCOUNTER — HOSPITAL ENCOUNTER (OUTPATIENT)
Dept: PULMONOLOGY | Age: 82
Discharge: HOME | End: 2017-06-22
Payer: MEDICARE

## 2017-06-22 ENCOUNTER — APPOINTMENT (OUTPATIENT)
Dept: PULMONOLOGY | Age: 82
End: 2017-06-22
Payer: MEDICARE

## 2017-06-22 PROCEDURE — G0424 PULMONARY REHAB W EXER: HCPCS

## 2017-06-22 NOTE — PROGRESS NOTES
[facility]  [phone] [address] DONOVAN Weeks  5/13/1929       Patient's carry-over of treatment delivered by: more pacing noted today    Objective Daily Findings    Comments:    Respiratory Muscle Functioning/Exercise Conditioning/Strengthening Session:    Time In: 1230  Time Out::0130  Session Number: 22  O2 with Theapy: 0 L/min    Pre SPO2 97  Pre HR:67  Pre BP: 94/56    RR: 15  Wt: 131  Temp: 97.9   Post SPO2: 96 Post HR: 59  Post BP: 97/58    Self Care Home Management Instruction/Education    Self Care Home Management Instruction Education: Breathing Retaining, Exercise Concepts and Body Mechanics & Energy Conversation/Work Simplification (EC/WS) Technique. Patient's Response to Education: Patient actively participated in education and Able to demonstrate. Comments: Individual Therapy               Goal     Actual                      During Therapy                 Post-Therapy     % SpO2 HR RPD 1 - 4 RPE 6-20 Pain  1 - 10 % SpO2 HR RPD 1 - 4 RPE 6-20 Pain 0 - 10   Stretching/DB/  Monitoring 10 min 10 min 96 73 1 6 1 96 65 1 6 1   IS 2000 ml  20 min 8613-1827  20 min 97 67 1 7 1 96 64 1 6 1   IMT               Arm Bike 25 villatoro  25 min 25w-5  20w-25 96 70 1 11 1 96 73 1 6 1   Weighted DB                 Patient's Response to Therapy: Increase fatique, Good effort and Good motivation.   Comments:    Time In: 0130     Session Number:23  Time Out: 0205     O2 with therapy: 0 L/min         Individual Therapy               Goal     Actual                      During Therapy           Post Therapy     % SpO2 HR RPD 1 - 4 RPE 6-20 Pain  1 - 10 % SpO2 HR RPD 1 - 4 RPE 6-20 Pain 0 - 10   Stepper               Treadmill 1.5 mph  30 min 1.3-20 min  1.2-10 min 96 81 1 11 1 96 71 1 6 1   Leg Bike               Stretching/DB/  Monitoring 5 min 5 min 96 71 1 6 1 96 65 1 6 1   Rest/PLB                    Patient's response to therapy: Increase fatique, Increase heart rate, Good effort and Good motivation  Comment:s      Assessment    Patient's response: Patient progressing towardsd LTGs evident by: Increased Pacing and Improved Aerobic capicity and endurance.     Plan: Continue as written    Code     Billin units      Physician supervision provided this date of service by: Dr Tomas Abreu     Therapist Signature: RT Emanuel    Therapist PRINTED Name and Credential: RT Emanuel    2017  7:58 AM

## 2017-06-27 ENCOUNTER — HOSPITAL ENCOUNTER (OUTPATIENT)
Dept: PULMONOLOGY | Age: 82
Discharge: HOME | End: 2017-06-27
Payer: MEDICARE

## 2017-06-27 ENCOUNTER — APPOINTMENT (OUTPATIENT)
Dept: PULMONOLOGY | Age: 82
End: 2017-06-27
Payer: MEDICARE

## 2017-06-27 PROCEDURE — G0424 PULMONARY REHAB W EXER: HCPCS

## 2017-06-27 NOTE — PROGRESS NOTES
[facility]  Outpatient Pulmonary Rehabilitation Flowsheet  [phone] [facility]  5/13/1929       Patient's carry-over of treatment delivered by: pt reports he always feels better after Rehab. Objective Daily Findings    Comments:    Respiratory Muscle Functioning/Exercise Conditioning/Strengthening Session:    Time In: 1230  Time Out::0130  Session Number: 24  O2 with Theapy: 0 L/min    Pre SPO2 99  Pre HR:60  Pre BP: 101/56    RR: 15  Wt: 130  Temp: 97.2   Post SPO2: 96 Post HR: 66  Post BP: 107/60    Self Care Home Management Instruction/Education    Self Care Home Management Instruction Education: Breathing Retaining and Body Mechanics & Energy Conversation/Work Simplification (EC/WS) Technique. Patient's Response to Education: Patient actively participated in education and Able to demonstrate. Comments: Individual Therapy               Goal     Actual                      During Therapy                 Post-Therapy     % SpO2 HR RPD 1 - 4 RPE 6-20 Pain  1 - 10 % SpO2 HR RPD 1 - 4 RPE 6-20 Pain 0 - 10   Stretching/DB/  Monitoring 10 min 10 min 96 71 1 6 1 97 64 1 6 1   IS 2000 ml  20 min 6232-9009  20 min 98 61 1 7 1 99 60 1 6 1   IMT               Arm Bike 25 villatoro  25 min 25w-10  20w-20 96 78 1 13 1 96 71 1 6 1   Weighted DB                 Patient's Response to Therapy: Increase fatique, Increase heart rate, Good effort and Good motivation.   Comments:    Time In: 0130     Session Number:25  Time Out: 0205     O2 with therapy: 0 L/min         Individual Therapy               Goal     Actual                      During Therapy           Post Therapy     % SpO2 HR RPD 1 - 4 RPE 6-20 Pain  1 - 10 % SpO2 HR RPD 1 - 4 RPE 6-20 Pain 0 - 10   Stepper               Treadmill               Leg Bike L3  30 min L2  30 min 95 85 2 14 1 96 72 1 9 1   Stretching/DB/  Monitoring 5 min 5 min 96 72 1 9 1 96 66 1 6 1   Rest/PLB                    Patient's response to therapy: Increase fatique, Increase heart rate, Good effort and Good motivation  Comment:s      Assessment    Patient's response: Patient progressing towardsd LTGs evident by: Improved Aerobic capicity and endurance.     Plan: Continue as written    Code     Billin units      Physician supervision provided this date of service by: Dr Vic Solis     Therapist Signature: RT Oliver    Therapist PRINTED Name and Credential: RT Oliver    2017  11:02 AM

## 2017-06-29 ENCOUNTER — HOSPITAL ENCOUNTER (OUTPATIENT)
Dept: PULMONOLOGY | Age: 82
Discharge: HOME | End: 2017-06-29
Payer: MEDICARE

## 2017-06-29 ENCOUNTER — APPOINTMENT (OUTPATIENT)
Dept: PULMONOLOGY | Age: 82
End: 2017-06-29
Payer: MEDICARE

## 2017-06-29 PROCEDURE — G0424 PULMONARY REHAB W EXER: HCPCS

## 2017-06-29 NOTE — PROGRESS NOTES
[facility]  Outpatient Pulmonary Rehabilitation Flowsheet  [id number] [facility]  5/13/1929       Patient's carry-over of treatment delivered by: pt continues to feel good after Rehab. Objective Daily Findings    Comments:    Respiratory Muscle Functioning/Exercise Conditioning/Strengthening Session:    Time In: 1230  Time Out::0130  Session Number: 26  O2 with Theapy: 0 L/min    Pre SPO2 97  Pre HR:70  Pre BP: 98/51    RR: 16  Wt: 127  Temp: 97.5   Post SPO2: 95 Post HR: 68  Post BP: 105/60    Self Care Home Management Instruction/Education    Self Care Home Management Instruction Education: Breathing Retaining and Pacing. Patient's Response to Education: Patient actively participated in education and Able to demonstrate. Comments: Individual Therapy               Goal     Actual                      During Therapy                 Post-Therapy     % SpO2 HR RPD 1 - 4 RPE 6-20 Pain  1 - 10 % SpO2 HR RPD 1 - 4 RPE 6-20 Pain 0 - 10   Stretching/DB/  Monitoring 10 min 10 min 97 74 1 6 1 97 70 1 6 1   IS 2000 ml  20 min 6577-6751  20 min 97 71 1 6 1 97 70 1 6 1   IMT               Arm Bike 25 villatoro  25 min 20w-25  15w-5 95 78 1 13 1 97 74 1 6 1   Weighted DB                 Patient's Response to Therapy: Increase fatique, Good effort and Good motivation.   Comments:    Time In: 0130     Session Number:27  Time Out: 0210     O2 with therapy: 0 L/min         Individual Therapy               Goal     Actual                      During Therapy           Post Therapy     % SpO2 HR RPD 1 - 4 RPE 6-20 Pain  1 - 10 % SpO2 HR RPD 1 - 4 RPE 6-20 Pain 0 - 10   Stepper               Treadmill 1.8 mph  30 min 1.7-10 min  1.5-10 min  1.2-15 min 94 74 1 11 1 95 70 1 6 1   Leg Bike               Stretching/DB/  Monitoring 5 min 5 min 95 70 1 6 1 95 68 1 6 1   Rest/PLB                    Patient's response to therapy: Increase fatique, Good effort and Good motivation  Comment:s      Assessment    Patient's response: Patient progressing towardsd LTGs evident by: Improved Aerobic capicity and endurance.     Plan: Continue as written    Code     Billin units      Physician supervision provided this date of service by: Dr Ada France     Therapist Signature: RT Inderjit    Therapist PRINTED Name and Credential: RT Inderjit    2017  1:12 PM

## 2017-07-04 ENCOUNTER — APPOINTMENT (OUTPATIENT)
Dept: PULMONOLOGY | Age: 82
End: 2017-07-04
Payer: MEDICARE

## 2017-07-06 ENCOUNTER — APPOINTMENT (OUTPATIENT)
Dept: PULMONOLOGY | Age: 82
End: 2017-07-06
Payer: MEDICARE

## 2017-07-11 ENCOUNTER — APPOINTMENT (OUTPATIENT)
Dept: PULMONOLOGY | Age: 82
End: 2017-07-11
Payer: MEDICARE

## 2017-07-11 ENCOUNTER — HOSPITAL ENCOUNTER (OUTPATIENT)
Dept: PHYSICAL THERAPY | Age: 82
Discharge: HOME | End: 2017-07-11
Payer: MEDICARE

## 2017-07-11 PROCEDURE — 97140 MANUAL THERAPY 1/> REGIONS: CPT

## 2017-07-11 PROCEDURE — 97110 THERAPEUTIC EXERCISES: CPT

## 2017-07-11 NOTE — PROGRESS NOTES
PT DAILY TREATMENT NOTE - [facility]     Patient Name: Maribel Race  Date:2017  : 1929  [x]  Patient  Verified  Payor: Silvio Font / Plan: [place] MEDICARE PART A & B / Product Type: Medicare /    In time:800  Out time:825  Total Treatment Time (min): 25  Total Timed Codes (min): 25  1:1 Treatment Time ( [address] only): 25   Visit #: 2 of 10    Treatment Area: Cervical pain [M54.2]    SUBJECTIVE  Pain Level (0-10 scale): 0  Any medication changes, allergies to medications, adverse drug reactions, diagnosis change, or new procedure performed?: [x] No    [] Yes (see summary sheet for update)  Subjective functional status/changes:   [] No changes reported  I was doing some work yesterday and I didn't get to prepare as much for today.      OBJECTIVE    Modality rationale: Patient declined   Min Type Additional Details    [] Estim:  []Unatt       []IFC  []Premod                        []Other:  []w/ice   []w/heat  Position:  Location:    [] Estim: []Att    []TENS instruct  []NMES                    []Other:  []w/US   []w/ice   []w/heat  Position:  Location:    []  Traction: [] Cervical       []Lumbar                       [] Prone          []Supine                       []Intermittent   []Continuous Lbs:  [] before manual  [] after manual    []  Ultrasound: []Continuous   [] Pulsed                           []1MHz   []3MHz W/cm2:  Location:    []  Iontophoresis with dexamethasone         Location: [] Take home patch   [] In clinic    []  Ice     []  heat  []  Ice massage  []  Laser   []  Anodyne Position:  Location:    []  Laser with stim  []  Other:  Position:  Location:    []  Vasopneumatic Device Pressure:       [] lo [] med [] hi   Temperature: [] lo [] med [] hi   [] Skin assessment post-treatment:  []intact []redness- no adverse reaction    []redness - adverse reaction:     10 min Therapeutic Exercise:  [x] See flow sheet :   Rationale: increase ROM and increase strength to improve the patients ability to perform daily tasks    15 min Manual Therapy:  Supine STM cervical paraspinals/Right UT/Right LS, gentle cervical manual stretching   Rationale: decrease pain, increase ROM, increase tissue extensibility and decrease trigger points to improve cervical mobility      With   [] TE   [] TA   [] neuro   [] other: Patient Education: [x] Review HEP    [] Progressed/Changed HEP based on:   [] positioning   [] body mechanics   [] transfers   [] heat/ice application    [] other:      Other Objective/Functional Measures: initiated treatment per flow sheet     Pain Level (0-10 scale) post treatment: 0    ASSESSMENT/Changes in Function: Tender to palpation in the Right cervical musculature. Reports noticeable stretching with supine chin tucks. No increase in ROM following manual techniques. Patient will continue to benefit from skilled PT services to modify and progress therapeutic interventions, address functional mobility deficits, address ROM deficits, address strength deficits, analyze and address soft tissue restrictions, analyze and cue movement patterns, analyze and modify body mechanics/ergonomics, assess and modify postural abnormalities, address imbalance/dizziness and instruct in home and community integration to attain remaining goals. []  See Plan of Care  []  See progress note/recertification  []  See Discharge Summary         Progress towards goals / Updated goals:  Short Term Goals: To be accomplished in 1 weeks:  Goal: Pt to be compliant with initial HEP to assist with improving head/neck posture. Status at last note/certification: Established  Current status: Met   Long Term Goals: To be accomplished in 5 weeks:  Goal: Pt to increase C/S AROM all planes by at least 5 deg to achieve neutral head/neck posture.   Status at last note/certification: Flex 40 deg; Ext -5 deg; SBR/SBL 15 deg; RR/RL 10 deg - all with head flexed at 40 deg  Goal: Pt to be able to sleep through the night on one pillow without increased pain to assist with neutral posture. Status at last note/certification: sleeps with two thick pillows, head fully flexed  Goal: Pt to report < 2/10 pain at worst to be able to look up or turn head with ease when performing daily tasks. Status at last note/certification: 6/03 pain at worst  Goal: Pt to report FOTO score of 60 pts to show improved posture, function.   Status at last note/certification: FOTO 48 pts at eval    PLAN  []  Upgrade activities as tolerated     [x]  Continue plan of care  []  Update interventions per flow sheet       []  Discharge due to:_  []  Other:_      Clem Dugan, PT 7/11/2017  7:13 AM    Future Appointments  Date Time Provider Saeed Tracy   7/11/2017 8:00 AM Clem Dugan PT [facility]   7/11/2017 12:30 PM [address] [facility]   7/13/2017 9:00 AM Clem Dugan PT [facility] [place] [facility]   7/13/2017 12:30 PM [address] 1 [facility]   7[facility]18/2017 9:30 AM Clem Dugan, PT [email or web address]   7/18/2017 12:30 PM [facility] PULM ROOM 1 [facility]   7/20/2017 9:00 AM Clem Dugan PT [facility] [place] [facility]   7/20/2017 12:30 PM [facility] PUL ROOM 1 [facility]   7/25/2017 8:00 AM Clem Dugan PT [facility] [place] [facility]   7/25/2017 12:30 PM [facility] PUL ROOM 1 [facility]   7/27/2017 9:00 AM Clem Velásquezmarta, PT [facility]   7/27/2017 12:30 PM [facility] PUL ROOM 1 [facility]   8/1/2017 12:30 PM [facility] PUL ROOM 1 [facility]   8/3/2017 12:30 PM [facility] PUL ROOM 1 [facility]   8/8/2017 12:30 PM [facility] PUL ROOM 1 [facility]   8/10/2017 12:30 PM [facility] PUL ROOM 1 [facility]   8/15/2017 12:30 PM [facility] PUL ROOM 1 [facility]   8/17/2017 12:30 PM [facility] PUL ROOM 1 [facility]

## 2017-07-12 ENCOUNTER — HOSPITAL ENCOUNTER (OUTPATIENT)
Dept: PHYSICAL THERAPY | Age: 82
Discharge: HOME | End: 2017-07-12
Payer: MEDICARE

## 2017-07-12 PROCEDURE — 97140 MANUAL THERAPY 1/> REGIONS: CPT

## 2017-07-12 NOTE — PROGRESS NOTES
PT DAILY TREATMENT NOTE - [facility] 3-    Patient Name: Yoseph Garcia  Date:2017  : 1929  [x]  Patient  Verified  Payor: [place] MED[place]ARE / Plan: [place] MEDICARE PART A & B / Product Type: Medicare /    In time:8:30  Out time:9:00  Total Treatment Time (min): 30  Total Timed Codes (min): 25 (waited for therapist 5 min)   1:1 Treatment Time ( [address] only): 15   Visit #: 3 of 10    Treatment Area: Cervical pain [M54.2]    SUBJECTIVE  Pain Level (0-10 scale): 0/10  Any medication changes, allergies to medications, adverse drug reactions, diagnosis change, or new procedure performed?: [x] No    [] Yes (see summary sheet for update)  Subjective functional status/changes:   [] No changes reported  Patient reported no pain when he is jsut sitting, but some discomfort when he attempts to move his neck. OBJECTIVE    10 min Therapeutic Exercise:  [x] See flow sheet :   Rationale: increase ROM and increase strength to improve the patients ability to perform ADLs     15 min Manual Therapy:  STM to (B) UT/Levator scap, cervical paraspinals (emphasis on (R) side) in supine and in seated   Rationale: decrease pain, increase ROM and increase tissue extensibility to increase ease with ADLs. With   [] TE   [] TA   [] neuro   [] other: Patient Education: [x] Review HEP    [] Progressed/Changed HEP based on:   [] positioning   [] body mechanics   [] transfers   [] heat/ice application    [] other:      Other Objective/Functional Measures:   Poor posture noted in sitting with rounded shoulders and forward head.       Pain Level (0-10 scale) post treatment: 0/10    ASSESSMENT/Changes in Function: Patient reported no pain at end of session and no discomfort with manual. Increased tightness noted at (R) UT/levator scap with manual.     Patient will continue to benefit from skilled PT services to modify and progress therapeutic interventions, address functional mobility deficits, address ROM deficits, address strength deficits, analyze and address soft tissue restrictions, analyze and cue movement patterns and assess and modify postural abnormalities to attain remaining goals. []  See Plan of Care  []  See progress note/recertification  []  See Discharge Summary         Progress towards goals / Updated goals:  Short Term Goals: To be accomplished in 1 weeks:  Goal: Pt to be compliant with initial HEP to assist with improving head/neck posture. Status at last note/certification: Established  Current status: Met   Long Term Goals: To be accomplished in 5 weeks:  Goal: Pt to increase C/S AROM all planes by at least 5 deg to achieve neutral head/neck posture. Status at last note/certification: Flex 40 deg; Ext -5 deg; SBR/SBL 15 deg; RR/RL 10 deg - all with head flexed at 40 deg  Goal: Pt to be able to sleep through the night on one pillow without increased pain to assist with neutral posture. Status at last note/certification: sleeps with two thick pillows, head fully flexed  Goal: Pt to report < 2/10 pain at worst to be able to look up or turn head with ease when performing daily tasks. Status at last note/certification: 5/59 pain at worst  Goal: Pt to report FOTO score of 60 pts to show improved posture, function.   Status at last note/certification: FOTO 48 pts at eval       PLAN  []  Upgrade activities as tolerated     [x]  Continue plan of care  []  Update interventions per flow sheet       []  Discharge due to:_  []  Other:_      Gary Casas, PT 7/12/2017  9:00 AM

## 2017-07-13 ENCOUNTER — APPOINTMENT (OUTPATIENT)
Dept: PULMONOLOGY | Age: 82
End: 2017-07-13
Payer: MEDICARE

## 2017-07-13 ENCOUNTER — APPOINTMENT (OUTPATIENT)
Dept: PHYSICAL THERAPY | Age: 82
End: 2017-07-13
Payer: MEDICARE

## 2017-07-18 ENCOUNTER — APPOINTMENT (OUTPATIENT)
Dept: PHYSICAL THERAPY | Age: 82
End: 2017-07-18
Payer: MEDICARE

## 2017-07-18 ENCOUNTER — APPOINTMENT (OUTPATIENT)
Dept: PULMONOLOGY | Age: 82
End: 2017-07-18
Payer: MEDICARE

## 2017-07-19 ENCOUNTER — HOSPITAL ENCOUNTER (OUTPATIENT)
Dept: PHYSICAL THERAPY | Age: 82
Discharge: HOME | End: 2017-07-19
Payer: MEDICARE

## 2017-07-19 PROCEDURE — 97110 THERAPEUTIC EXERCISES: CPT

## 2017-07-19 PROCEDURE — 97140 MANUAL THERAPY 1/> REGIONS: CPT

## 2017-07-19 NOTE — PROGRESS NOTES
PT DAILY TREATMENT NOTE - [facility] 3-16    Patient Name: Jason Unger  Date:2017  : 1929  [x]  Patient  Verified  Payor: Kary Ceron / Plan: [place] MEDICARE PART A & B / Product Type: Medicare /    In time:11:00  Out time:11:40  Total Treatment Time (min): 40  Total Timed Codes (min): 40   1:1 Treatment Time ( [address] only): 25  Visit #: 4 of 10    Treatment Area: Cervical pain [M54.2]    SUBJECTIVE  Pain Level (0-10 scale): 0/10  Any medication changes, allergies to medications, adverse drug reactions, diagnosis change, or new procedure performed?: [x] No    [] Yes (see summary sheet for update)  Subjective functional status/changes:   [] No changes reported  \"I don't have any pain right now but I do have pain when I move my head in any direction. \"     OBJECTIVE    Modality rationale: decrease pain and increase tissue extensibility to improve the patients ability to turn head without difficulty   Min Type Additional Details    [] Estim:  []Unatt       []IFC  []Premod                        []Other:  []w/ice   []w/heat  Position:  Location:    [] Estim: []Att    []TENS instruct  []NMES                    []Other:  []w/US   []w/ice   []w/heat  Position:  Location:    []  Traction: [] Cervical       []Lumbar                       [] Prone          []Supine                       []Intermittent   []Continuous Lbs:  [] before manual  [] after manual    []  Ultrasound: []Continuous   [] Pulsed                           []1MHz   []3MHz Location:  W/cm2:    []  Iontophoresis with dexamethasone         Location: [] Take home patch   [] In clinic   10 []  Ice     [x]  heat  []  Ice massage  []  Laser   []  Anodyne Position: supine  Location: neck    []  Laser with stim  []  Other: Position:  Location:    []  Vasopneumatic Device Pressure:       [] lo [] med [] hi   Temperature: [] lo [] med [] hi   [] Skin assessment post-treatment:  []intact []redness- no adverse reaction    []redness - adverse reaction:     15 min Therapeutic Exercise:  [x] See flow sheet :   Rationale: increase ROM and increase strength to improve the patients ability to perform ADLs     15 min Manual Therapy:  Gentle SOR; STM to B UT, C/S paraspinals   Rationale: decrease pain, increase ROM and increase tissue extensibility to increase ease with ADLs. With   [] TE   [] TA   [] neuro   [] other: Patient Education: [x] Review HEP    [] Progressed/Changed HEP based on:   [] positioning   [] body mechanics   [] transfers   [] heat/ice application    [] other:      Other Objective/Functional Measures: Continued with current program.  Pt continues to sit with forward head posture and flexed trunk posture. Pain Level (0-10 scale) post treatment: 1/10    ASSESSMENT/Changes in Function: Reassessed goals. Pt continues to report pain on R side of neck with turning head or moving it in any direction. Patient will continue to benefit from skilled PT services to modify and progress therapeutic interventions, address functional mobility deficits, address ROM deficits, address strength deficits, analyze and address soft tissue restrictions, analyze and cue movement patterns and assess and modify postural abnormalities to attain remaining goals. []  See Plan of Care  [x]  See progress note/recertification  []  See Discharge Summary         Progress towards goals / Updated goals:  Short Term Goals: To be accomplished in 1 weeks:  Goal: Pt to be compliant with initial HEP to assist with improving head/neck posture. Status at last note/certification: Established  Current status: Met   Long Term Goals: To be accomplished in 5 weeks:  Goal: Pt to increase C/S AROM all planes by at least 5 deg to achieve neutral head/neck posture.   Status at last note/certification: Flex 40 deg; Ext -5 deg; SBR/SBL 15 deg; RR/RL 10 deg - all with head flexed at 40 deg  Current status: progressing - Flex 40 deg; Ext -5 deg; SBR 15 deg; SBL 22 deg; RR 20 deg; RL 30 deg  Goal: Pt to be able to sleep through the night on one pillow without increased pain to assist with neutral posture. Status at last note/certification: sleeps with two thick pillows, head fully flexed  Current status: progressing - sleeps with one pillow folded in half  Goal: Pt to report < 2/10 pain at worst to be able to look up or turn head with ease when performing daily tasks. Status at last note/certification: 9/54 pain at worst  Current status: not met - 4/10 pain at worst  Goal: Pt to report FOTO score of 60 pts to show improved posture, function.   Status at last note/certification: FOTO 48 pts at eval  Current status: reassess visit 5     PLAN  []  Upgrade activities as tolerated     [x]  Continue plan of care  []  Update interventions per flow sheet       []  Discharge due to:_  []  Other:_      Kisha Rao, PT 7/19/2017  9:00 AM

## 2017-07-20 ENCOUNTER — APPOINTMENT (OUTPATIENT)
Dept: PULMONOLOGY | Age: 82
End: 2017-07-20
Payer: MEDICARE

## 2017-07-20 ENCOUNTER — HOSPITAL ENCOUNTER (OUTPATIENT)
Dept: PULMONOLOGY | Age: 82
Discharge: HOME | End: 2017-07-20
Payer: MEDICARE

## 2017-07-20 ENCOUNTER — APPOINTMENT (OUTPATIENT)
Dept: PHYSICAL THERAPY | Age: 82
End: 2017-07-20
Payer: MEDICARE

## 2017-07-20 PROCEDURE — G0424 PULMONARY REHAB W EXER: HCPCS

## 2017-07-20 NOTE — PROGRESS NOTES
[facility]  Outpatient Pulmonary Rehabilitation Flowsheet  [id number] [facility]  5/13/1929       Patient's carry-over of treatment delivered by: pt reports he walked a lot while out of town. Objective Daily Findings    Comments:    Respiratory Muscle Functioning/Exercise Conditioning/Strengthening Session:    Time In: 1230  Time Out::0130  Session Number: 28  O2 with Theapy: 0 L/min    Pre SPO2 98  Pre HR:63  Pre BP: 98/51    RR: 15  Wt: 129  Temp: 97.5   Post SPO2: 96 Post HR: 69  Post BP: 112/59    Self Care Home Management Instruction/Education    Self Care Home Management Instruction Education: Breathing Retaining and Body Mechanics & Energy Conversation/Work Simplification (EC/WS) Technique. Patient's Response to Education: Patient actively participated in education and Able to demonstrate. Comments: Individual Therapy               Goal     Actual                      During Therapy                 Post-Therapy     % SpO2 HR RPD 1 - 4 RPE 6-20 Pain  1 - 10 % SpO2 HR RPD 1 - 4 RPE 6-20 Pain 0 - 10   Stretching/DB/  Monitoring 10 min 10 min 96 75 1 6 1 96 71 1 6 1   IS 2000 ml  20 min [id number]  20 min 97 65 1 6 1 98 63 1 6 1   IMT               Arm Bike 25 villatoro  30 min 20w-15  15w-15 94 91 1 14 1 96 75 1 6 1   Weighted DB                 Patient's Response to Therapy: Increase fatique, Increase heart rate, Good effort and Good motivation.   Comments:    Time In: 0130     Session Number:29  Time Out: 0205     O2 with therapy: 0 L/min         Individual Therapy               Goal     Actual                      During Therapy           Post Therapy     % SpO2 HR RPD 1 - 4 RPE 6-20 Pain  1 - 10 % SpO2 HR RPD 1 - 4 RPE 6-20 Pain 0 - 10   Stepper               Treadmill               Leg Bike L3  30 min L1  25 min 93 94 1 13 1 96 72 1 9 1   Stretching/DB/  Monitoring 10 min 10 min 96 72 1 9 1 96 69 1 6 1   Rest/PLB                    Patient's response to therapy: Increase fatique, Increase heart rate, Good effort and Good motivation  Comment:s      Assessment    Patient's response: Patient progressing towardsd LTGs evident by: Increased PLB.     Plan: Continue as written    Code     Billin units      Physician supervision provided this date of service by: Dr Kassi Marshall     Therapist Signature: RT Dom    Therapist PRINTED Name and Credential: RT Dom    2017  12:36 PM

## 2017-07-24 ENCOUNTER — HOSPITAL ENCOUNTER (OUTPATIENT)
Dept: PHYSICAL THERAPY | Age: 82
Discharge: HOME | End: 2017-07-24
Payer: MEDICARE

## 2017-07-24 PROCEDURE — G8981 BODY POS CURRENT STATUS: HCPCS

## 2017-07-24 PROCEDURE — 97140 MANUAL THERAPY 1/> REGIONS: CPT

## 2017-07-24 PROCEDURE — 97110 THERAPEUTIC EXERCISES: CPT

## 2017-07-24 PROCEDURE — G8982 BODY POS GOAL STATUS: HCPCS

## 2017-07-24 NOTE — PROGRESS NOTES
[facility] - [facility], [address] [place] [address]  [phone] fax    Continued Plan of Care/ Re-certification for Physical Therapy Services      Patient name: Ella Granado Start of Care: 2017   Referral source: Reymundo Miller DO : 1929                         Medical Diagnosis: Cervical pain [M54.2] Onset Date:17                         Treatment Diagnosis: Torticollis, Neck Pain   Prior Hospitalization: see medical history Provider#: [id number]   Medications: Verified on Patient summary List    Comorbidities: Heart disease; Arthirits; Osteoporosis; Thyroid problems; COPD; visually impaired - glasses; hearing impaired - hearing aids; hx of skin CA   Prior Level of Function: Lives with spouse; retired; manages household, yardwork with spouse    Visits from [employer] of Care: 5    Missed Visits: 1    The Plan of Care and following information is based on the patient's current status:  Short Term Goals: To be accomplished in 1 weeks:  Goal: Pt to be compliant with initial HEP to assist with improving head/neck posture. Status at last note/certification: Established  Current status: Met   Long Term Goals: To be accomplished in 5 weeks:  Goal: Pt to increase C/S AROM all planes by at least 5 deg to achieve neutral head/neck posture. Status at last note/certification: Flex 40 deg; Ext -5 deg; SBR/SBL 15 deg; RR/RL 10 deg - all with head flexed at 40 deg  Current status: progressing - Flex 40 deg; Ext to neutral with effort; SBR 15 deg; SBL 22 deg; RR 20 deg; RL 30 deg  Goal: Pt to be able to sleep through the night on one pillow without increased pain to assist with neutral posture.   Status at last note/certification: sleeps with two thick pillows, head fully flexed  Current status: progressing - sleeps with one pillow folded in half  Goal: Pt to report < 2/10 pain at worst to be able to look up or turn head with ease when performing daily tasks.  Status at last note/certification:  pain at worst  Current status: not met - 4/10 pain at worst  Goal: Pt to report FOTO score of 60 pts to show improved posture, function. Status at last note/certification: FOTO 48 pts at eval  Current status: progressing- 58     Key functional changes:   Functional Gains: able to participate in general conditioning program, pain is not terrible  Functional Deficits: limited cervical ROM, poor postural awareness   % improvement: 50%  Pain   Average: 0/10       Best: 0/10     Worst: 10  Patient Goal: \"Increased ROM, ability to lift my head up\"      Problems/ barriers to goal attainment: none     Problem List: pain affecting function, decrease ROM, decrease strength, edema affecting function, impaired gait/ balance, decrease ADL/ functional abilitiies, decrease activity tolerance, decrease flexibility/ joint mobility and decrease transfer abilities    Treatment Plan: Therapeutic exercise, Therapeutic activities, Neuromuscular re-education, Physical agent/modality, Gait/balance training, Manual therapy, Aquatic therapy, Patient education, Self Care training, Functional mobility training, Home safety training and Stair training     Goals for this certification period to be accomplished in 5 weeks:  Long term goals remain appropriate    Frequency / Duration: Patient to be seen 2 times per week for 5 weeks:    Assessment / Recommendations:Patient has attended a few appointments for neck pain over the past month (was out of town for a  recently). Patient is able to hold head in a more neutral posture, but for only a short amount of time before having soreness/fatigue. Recommend continuing skilled physical therapy on a more consistent basis in order to improve cervical mobility through gentle exercises and manual techniques.      G-Codes (GP)  [id number] Current  CK= 40-59%  [id number] Goal  CK= 40-59%    The severity rating is based on clinical judgment and the [facility] score.    Certification Period: 7/22/17 to 8/20/17    Helena Gibbs, PT 7/24/2017 10:56 AM    ________________________________________________________________________  I certify that the above Therapy Services are being furnished while the patient is under my care. I agree with the treatment plan and certify that this therapy is necessary. [] I have read the above and request that my patient continue as recommended.   [] I have read the above report and request that my patient continue therapy with the following changes/special instructions: ______________________________________  [] I have read the above report and request that my patient be discharged from therapy    Physician's Signature:_______________________________Date:___________Time:__________  Please sign and return to [facility] - [address]  [phone] fax

## 2017-07-24 NOTE — PROGRESS NOTES
PT DAILY TREATMENT NOTE - [facility] 3-16    Patient Name: Amber Miller  Date:2017  : 1929  [x]  Patient  Verified  Payor: Christofer Landa / Plan: [place] MEDICARE PART A & B / Product Type: Medicare /    In time:1058  Out time:1130  Total Treatment Time (min): 32  Total Timed Codes (min): 32  1:1 Treatment Time ( [address] only): 32   Visit #: 1 of 10    Treatment Area: Cervical pain [M54.2]    SUBJECTIVE  Pain Level (0-10 scale): 0  Any medication changes, allergies to medications, adverse drug reactions, diagnosis change, or new procedure performed?: [x] No    [] Yes (see summary sheet for update)  Subjective functional status/changes:   [] No changes reported  I just got back from a long trip.     OBJECTIVE    Modality rationale: Patient declined   Min Type Additional Details    [] Estim:  []Unatt       []IFC  []Premod                        []Other:  []w/ice   []w/heat  Position:  Location:    [] Estim: []Att    []TENS instruct  []NMES                    []Other:  []w/US   []w/ice   []w/heat  Position:  Location:    []  Traction: [] Cervical       []Lumbar                       [] Prone          []Supine                       []Intermittent   []Continuous Lbs:  [] before manual  [] after manual    []  Ultrasound: []Continuous   [] Pulsed                           []1MHz   []3MHz Location:  W/cm2:    []  Iontophoresis with dexamethasone         Location: [] Take home patch   [] In clinic    []  Ice     []  heat  []  Ice massage  []  Laser   []  Anodyne Position:  Location:    []  Laser with stim  []  Other: Position:  Location:    []  Vasopneumatic Device Pressure:       [] lo [] med [] hi   Temperature: [] lo [] med [] hi   [] Skin assessment post-treatment:  []intact []redness- no adverse reaction    []redness - adverse reaction:     17 min Therapeutic Exercise:  [x] See flow sheet :   Rationale: increase ROM and increase strength to improve the patients ability to improve ease of driving, ADL    15 min Manual Therapy:  Gentle SOR, gentle cervical manual stretching/distraction, STM Bilateral UT/scalenes/cervical paraspinals   Rationale: decrease pain, increase ROM, increase tissue extensibility and decrease trigger points to improve cervical mobility/posture      With   [] TE   [] TA   [] neuro   [] other: Patient Education: [x] Review HEP    [] Progressed/Changed HEP based on:   [] positioning   [] body mechanics   [] transfers   [] heat/ice application    [] other:      Other Objective/Functional Measures: see PN     Pain Level (0-10 scale) post treatment: 0    ASSESSMENT/Changes in Function: Patient is able to hold his neck in about neutral flexion/extension but with soreness, significantly decreased endurance. Patient will continue to benefit from skilled PT services to modify and progress therapeutic interventions, address functional mobility deficits, address ROM deficits, address strength deficits, analyze and address soft tissue restrictions, analyze and cue movement patterns, analyze and modify body mechanics/ergonomics, assess and modify postural abnormalities, address imbalance/dizziness and instruct in home and community integration to attain remaining goals. []  See Plan of Care  [x]  See progress note/recertification  []  See Discharge Summary         Progress towards goals / Updated goals:  Short Term Goals: To be accomplished in 1 weeks:  Goal: Pt to be compliant with initial HEP to assist with improving head/neck posture. Status at last note/certification: Established  Current status: Met   Long Term Goals: To be accomplished in 5 weeks:  Goal: Pt to increase C/S AROM all planes by at least 5 deg to achieve neutral head/neck posture. Status at last note/certification: Flex 40 deg; Ext -5 deg; SBR/SBL 15 deg; RR/RL 10 deg - all with head flexed at 40 deg  Current status: progressing - Flex 40 deg;  Ext to about neutral with effor; SBR 15 deg; SBL 22 deg; RR 20 deg; RL 30 deg  Goal: Pt to be able to sleep through the night on one pillow without increased pain to assist with neutral posture. Status at last note/certification: sleeps with two thick pillows, head fully flexed  Current status: progressing - sleeps with one pillow folded in half  Goal: Pt to report < 2/10 pain at worst to be able to look up or turn head with ease when performing daily tasks. Status at last note/certification: 4/73 pain at worst  Current status: not met - 4/10 pain at worst  Goal: Pt to report FOTO score of 60 pts to show improved posture, function.   Status at last note/certification: FOTO 48 pts at eval  Current status: Progressing, 58    PLAN  [x]  Upgrade activities as tolerated     [x]  Continue plan of care  []  Update interventions per flow sheet       []  Discharge due to:_  []  Other:_      Brisa Overton, PT 7/24/2017  10:54 AM

## 2017-07-25 ENCOUNTER — HOSPITAL ENCOUNTER (OUTPATIENT)
Dept: PULMONOLOGY | Age: 82
Discharge: HOME | End: 2017-07-25
Payer: MEDICARE

## 2017-07-25 ENCOUNTER — APPOINTMENT (OUTPATIENT)
Dept: PULMONOLOGY | Age: 82
End: 2017-07-25
Payer: MEDICARE

## 2017-07-25 ENCOUNTER — APPOINTMENT (OUTPATIENT)
Dept: PHYSICAL THERAPY | Age: 82
End: 2017-07-25
Payer: MEDICARE

## 2017-07-25 PROCEDURE — G0424 PULMONARY REHAB W EXER: HCPCS

## 2017-07-25 NOTE — PROGRESS NOTES
[facility]  Outpatient Pulmonary Rehabilitation Flowsheet  [id number] [facility]  5/13/1929       Patient's carry-over of treatment delivered by: pt reports he feels so much better since starting Rehab. Objective Daily Findings    Comments:    Respiratory Muscle Functioning/Exercise Conditioning/Strengthening Session:    Time In: 1230  Time Out::0130  Session Number: 30  O2 with Theapy: 0 L/min    Pre SPO2 96  Pre HR:63  Pre BP: 112/58    RR: 14  Wt: 130  Temp: 96.8    Post SPO2: 96 Post HR: 64  Post BP: 113/60    Self Care Home Management Instruction/Education    Self Care Home Management Instruction Education: Breathing Retaining and Exercise Concepts. Patient's Response to Education: Patient actively participated in education and Able to demonstrate. Comments: Individual Therapy               Goal     Actual                      During Therapy                 Post-Therapy     % SpO2 HR RPD 1 - 4 RPE 6-20 Pain  1 - 10 % SpO2 HR RPD 1 - 4 RPE 6-20 Pain 0 - 10   Stretching/DB/  Monitoring 10 min 10 min 96 64 1 6 1 96 64 1 6 1   IS               IMT               Arm Bike 20 villatoro  30 min 20w-20  15w-10 96 72 1 13 1 96 68 1 6 1   Treadmill 1.8 mph  20 min 1.8-10 min  1.6-10 min 89/91 82/79 1 12 1 96 64 1 6 1     Patient's Response to Therapy: Increase fatique, Increase heart rate, Decrease SpO2, Good effort and Good motivation.   Comments:    Time In: 0130     Session Number:31  Time Out: 0215     O2 with therapy: 0 L/min         Individual Therapy               Goal     Actual                      During Therapy           Post Therapy     % SpO2 HR RPD 1 - 4 RPE 6-20 Pain  1 - 10 % SpO2 HR RPD 1 - 4 RPE 6-20 Pain 0 - 10   Stepper               Treadmill               Leg Bike L3  30 min L2-25 min  L1-5 min 98 82 1 12 1 97 77 1 6 1   Stretching/DB/  Monitoring 15 min 15 min 97 77 1 6 1 96 64 1 6 1                       Patient's response to therapy: Increase fatique, Increase heart rate, Good effort and Good motivation  Comment:s      Assessment    Patient's response: Patient progressing towardsd LTGs evident by: Improved Aerobic capicity and endurance.     Plan: Continue as written    Code     Billin units      Physician supervision provided this date of service by: Dr Hall [facility]     Therapist Signature: RT Xena    Therapist PRINTED Name and Credential: RT Xena    2017  12:43 PM

## 2017-07-26 ENCOUNTER — HOSPITAL ENCOUNTER (OUTPATIENT)
Dept: PHYSICAL THERAPY | Age: 82
Discharge: HOME | End: 2017-07-26
Payer: MEDICARE

## 2017-07-26 PROCEDURE — 97140 MANUAL THERAPY 1/> REGIONS: CPT

## 2017-07-26 PROCEDURE — 97110 THERAPEUTIC EXERCISES: CPT

## 2017-07-27 ENCOUNTER — APPOINTMENT (OUTPATIENT)
Dept: PULMONOLOGY | Age: 82
End: 2017-07-27
Payer: MEDICARE

## 2017-07-27 ENCOUNTER — APPOINTMENT (OUTPATIENT)
Dept: PHYSICAL THERAPY | Age: 82
End: 2017-07-27
Payer: MEDICARE

## 2017-07-27 ENCOUNTER — HOSPITAL ENCOUNTER (OUTPATIENT)
Dept: PULMONOLOGY | Age: 82
Discharge: HOME | End: 2017-07-27
Payer: MEDICARE

## 2017-07-27 PROCEDURE — G0424 PULMONARY REHAB W EXER: HCPCS

## 2017-07-27 NOTE — PROGRESS NOTES
DR. TORRES'S [facility]  Outpatient Pulmonary Rehabilitation Flowsheet  [id number] [facility] [place]  5/13/1929       Patient's carry-over of treatment delivered by: pt continues to feel better after Rehab. Objective Daily Findings    Comments:    Respiratory Muscle Functioning/Exercise Conditioning/Strengthening Session:    Time In: 1230  Time Out::0130  Session Number: 32  O2 with Theapy: 0 L/min    Pre SPO2 95  Pre HR:58  Pre BP: 100/57    RR: 15  Wt: 130  Temp: 97.0   Post SPO2: 96 Post HR: 62  Post BP: 106/53    Self Care Home Management Instruction/Education    Self Care Home Management Instruction Education: Breathing Retaining and Pacing. Patient's Response to Education: Patient actively participated in education and Able to demonstrate. Comments: Individual Therapy               Goal     Actual                      During Therapy                 Post-Therapy     % SpO2 HR RPD 1 - 4 RPE 6-20 Pain  1 - 10 % SpO2 HR RPD 1 - 4 RPE 6-20 Pain 0 - 10   Stretching/DB/  Monitoring 10 min 10 min 96 55 1 6 1 96 58 1 6 1   IS 2000 ml  20 min 3490-7517  20 min 95 58 1 6 1 96 60 1 6 1   IMT               Arm Bike 20 villatoro  30 min 20w-25  15w-5 96 71 1 13 1 96 55 1 6 1   Weighted DB                 Patient's Response to Therapy: Increase fatique, Increase heart rate, Good effort and Good motivation.   Comments:    Time In: 0130     Session Number:33  Time Out: 0210     O2 with therapy: 0 L/min         Individual Therapy               Goal     Actual                      During Therapy           Post Therapy     % SpO2 HR RPD 1 - 4 RPE 6-20 Pain  1 - 10 % SpO2 HR RPD 1 - 4 RPE 6-20 Pain 0 - 10   Stepper               Treadmill               Leg Bike L3  20 min L3-15 min  L2-15 min 96 77 1 13 1 96 68 1 6 1   Stretching/DB/  Monitoring 10 min 10 min 96 68 1 6 1 96 62 1 6 1   Rest/PLB                    Patient's response to therapy: Increase fatique, Increase heart rate, Good effort and Good motivation  Comment:s      Assessment    Patient's response: Patient progressing towardsd LTGs evident by: Improved Aerobic capicity and endurance.     Plan: Continue as written    Code     Billin units      Physician supervision provided this date of service by: Dr Sophie Welsh     Therapist Signature: RT James    Therapist PRINTED Name and Credential: RT James    2017  1:08 PM

## 2017-07-31 ENCOUNTER — HOSPITAL ENCOUNTER (OUTPATIENT)
Dept: PHYSICAL THERAPY | Age: 82
Discharge: HOME | End: 2017-07-31
Payer: MEDICARE

## 2017-07-31 PROCEDURE — 97140 MANUAL THERAPY 1/> REGIONS: CPT

## 2017-07-31 PROCEDURE — 97110 THERAPEUTIC EXERCISES: CPT

## 2017-07-31 NOTE — PROGRESS NOTES
PT DAILY TREATMENT NOTE - [facility]     Patient Name: Kevin Cooney  Date:2017  : 1929  [x]  Patient  Verified  Payor: [place] MEDICARE / Plan: [place] MEDICARE PART A & B / Product Type: Medicare /    In time:330  Out time:400  Total Treatment Time (min): 30  Total Timed Codes (min): 30  1:1 Treatment Time ( [address] only): 30   Visit #: 3 of 10    Treatment Area: Cervical pain [M54.2]    SUBJECTIVE  Pain Level (0-10 scale): 0  Any medication changes, allergies to medications, adverse drug reactions, diagnosis change, or new procedure performed?: [x] No    [] Yes (see summary sheet for update)  Subjective functional status/changes:   [] No changes reported  I'm fine.      OBJECTIVE    Modality rationale:    Min Type Additional Details    [] Estim:  []Unatt       []IFC  []Premod                        []Other:  []w/ice   []w/heat  Position:  Location:    [] Estim: []Att    []TENS instruct  []NMES                    []Other:  []w/US   []w/ice   []w/heat  Position:  Location:    []  Traction: [] Cervical       []Lumbar                       [] Prone          []Supine                       []Intermittent   []Continuous Lbs:  [] before manual  [] after manual    []  Ultrasound: []Continuous   [] Pulsed                           []1MHz   []3MHz W/cm2:  Location:    []  Iontophoresis with dexamethasone         Location: [] Take home patch   [] In clinic    []  Ice     []  heat  []  Ice massage  []  Laser   []  Anodyne Position:  Location:    []  Laser with stim  []  Other:  Position:  Location:    []  Vasopneumatic Device Pressure:       [] lo [] med [] hi   Temperature: [] lo [] med [] hi   [] Skin assessment post-treatment:  []intact []redness- no adverse reaction    []redness - adverse reaction:     18 min Therapeutic Exercise:  [x] See flow sheet :   Rationale: increase ROM and increase strength to improve the patients ability to improve posture with daily tasks    12 min Manual Therapy:  Gentle SOR, cervical manual distraction, STM B UT/scalenes, cervical paraspinals   Rationale: decrease pain, increase ROM, increase tissue extensibility and decrease trigger points to improve ease of holding head up     With   [] TE   [] TA   [] neuro   [] other: Patient Education: [x] Review HEP    [] Progressed/Changed HEP based on:   [] positioning   [] body mechanics   [] transfers   [] heat/ice application    [] other:      Other Objective/Functional Measures: added UBE     Pain Level (0-10 scale) post treatment: 0    ASSESSMENT/Changes in Function: Frequent cues to keep head up. Progressed routine today to progress upper body stability. Patient will continue to benefit from skilled PT services to modify and progress therapeutic interventions, address functional mobility deficits, address ROM deficits, address strength deficits, analyze and address soft tissue restrictions, analyze and cue movement patterns, analyze and modify body mechanics/ergonomics, assess and modify postural abnormalities, address imbalance/dizziness and instruct in home and community integration to attain remaining goals. []  See Plan of Care  []  See progress note/recertification  []  See Discharge Summary         Progress towards goals / Updated goals:  Goal: Pt to increase C/S AROM all planes by at least 5 deg to achieve neutral head/neck posture. Status at last note/certification: Flex 40 deg; Ext -5 deg; SBR/SBL 15 deg; RR/RL 10 deg - all with head flexed at 40 deg  Current status: progressing - Flex 40 deg; Ext to neutral with effort; SBR 15 deg; SBL 22 deg; RR 20 deg; RL 30 deg  Goal: Pt to be able to sleep through the night on one pillow without increased pain to assist with neutral posture.   Status at last note/certification: sleeps with two thick pillows, head fully flexed  Current status: progressing - sleeps with one pillow folded in half  Goal: Pt to report < 2/10 pain at worst to be able to look up or turn head with ease when performing daily tasks.  Status at last note/certification: 5/84 pain at worst  Current status: not met - 4/10 pain at worst  Goal: Pt to report FOTO score of 60 pts to show improved posture, function.   Status at last note/certification: FOTO 48 pts at eval  Current status: progressing- 58     PLAN  [x]  Upgrade activities as tolerated     [x]  Continue plan of care  []  Update interventions per flow sheet       []  Discharge due to:_  []  Other:_      Destiny Metz, PT 7/31/2017  12:48 PM    Future Appointments  Date Time Provider Saeed Tracy   7/31/2017 3:30 PM Destiny Metz, PT [facility]   8/1/2017 12:30 PM [address] [facility]   8/2/2017 10:30 AM Kisha Rao PT [facility] [place] [facility]   8/3/2017 12:30 PM [address] [facility]   8/7/2017 10:30 AM Destiny Metz PT [facility]   8/8/2017 12:30 PM [facility] PUL ROOM 1 [facility]   8/9/2017 10:30 AM Kisha Rao PT [facility]   8/10/2017 12:30 PM [facility] PUL ROOM 1 [facility]   8/14/2017 10:30 AM Destiny Metz PT [facility]   8/15/2017 12:30 PM [facility] PUL [facility]OM 1 [facility]   8/16/2017 10:30 AM Kisha Rao PT [facility]   8/17/2017 12:30 PM [facility] PUL [facility]OM 1 [facility]

## 2017-08-01 ENCOUNTER — HOSPITAL ENCOUNTER (OUTPATIENT)
Dept: PULMONOLOGY | Age: 82
Discharge: HOME | End: 2017-08-01
Payer: MEDICARE

## 2017-08-01 ENCOUNTER — APPOINTMENT (OUTPATIENT)
Dept: PULMONOLOGY | Age: 82
End: 2017-08-01
Payer: MEDICARE

## 2017-08-01 PROCEDURE — G0424 PULMONARY REHAB W EXER: HCPCS

## 2017-08-01 NOTE — PROGRESS NOTES
DR. TORRES'S [facility]  Outpatient Pulmonary Rehabilitation Flowsheet  [id number] [facility] [place]  5/13/1929       Patient's carry-over of treatment delivered by: PLB while exercising when working harder. Objective Daily Findings    Comments:    Respiratory Muscle Functioning/Exercise Conditioning/Strengthening Session:    Time In: 1230  Time Out::0130  Session Number: 34  O2 with Theapy: 0 L/min    Pre SPO2 97  Pre HR:68  Pre BP: 111/61    RR: 15  Wt: 130  Temp: 97.4   Post SPO2: 96 Post HR: 70  Post BP: 106/62    Self Care Home Management Instruction/Education    Self Care Home Management Instruction Education: Breathing Retaining and Exercise Concepts. Patient's Response to Education: Patient actively participated in education and Able to demonstrate. Comments: Individual Therapy               Goal     Actual                      During Therapy                 Post-Therapy     % SpO2 HR RPD 1 - 4 RPE 6-20 Pain  1 - 10 % SpO2 HR RPD 1 - 4 RPE 6-20 Pain 0 - 10   Stretching/DB/  Monitoring 10 min 10 min 94 74 1 6 1 96 71 1 6 1   IS 2000 ml  20 min 9637-2536  20 min 97 68 1 6 1 96 70 1 6 1   IMT               Arm Bike 25 villatoro  30 min 25w-5  20w-25 96 75 1 15 1 94 74 1 6 1   Weighted DB                 Patient's Response to Therapy: Increase fatique, Good effort and Good motivation.   Comments:    Time In: 0130     Session Number:35  Time Out: 0210     O2 with therapy: 0 L/min         Individual Therapy               Goal     Actual                      During Therapy           Post Therapy     % SpO2 HR RPD 1 - 4 RPE 6-20 Pain  1 - 10 % SpO2 HR RPD 1 - 4 RPE 6-20 Pain 0 - 10   Stepper L6  20 min L6-5 min  L5-25 min 92 89 1 15 1 94 76 1 6 1   Treadmill               Leg Bike               Stretching/DB/  Monitoring 10 min 10 min 94 76 1 6 1 96 70 1 6 1   Rest/PLB                    Patient's response to therapy: Increase fatique, Increase heart rate, Good effort and Good motivation  Comment:s      Assessment    Patient's response: Patient progressing towardsd LTGs evident by: Increased PLB and Improved Aerobic capicity and endurance.     Plan: Continue as written    Code     Billin units      Physician supervision provided this date of service by: Dr Herrera Or     Therapist Signature: RT Oliva    Therapist PRINTED Name and Credential: RT Oliva    2017  2:55 PM

## 2017-08-02 ENCOUNTER — HOSPITAL ENCOUNTER (OUTPATIENT)
Dept: PHYSICAL THERAPY | Age: 82
Discharge: HOME | End: 2017-08-02
Payer: MEDICARE

## 2017-08-02 PROCEDURE — 97110 THERAPEUTIC EXERCISES: CPT

## 2017-08-02 PROCEDURE — 97112 NEUROMUSCULAR REEDUCATION: CPT

## 2017-08-02 PROCEDURE — 97140 MANUAL THERAPY 1/> REGIONS: CPT

## 2017-08-02 NOTE — PROGRESS NOTES
PT DAILY TREATMENT NOTE - [facility]     Patient Name: Raymond Cool  Date:2017  : 1929  [x]  Patient  Verified  Payor: Renny Frankel / Plan: [place] MEDICARE PART A & B / Product Type: Medicare /    In time:10:30  Out time:11:00  Total Treatment Time (min): 30  Total Timed Codes (min): 30  1:1 Treatment Time ( [address] only): 30   Visit #: 4 of 10    Treatment Area: Cervical pain [M54.2]    SUBJECTIVE  Pain Level (0-10 scale): 0/10  Any medication changes, allergies to medications, adverse drug reactions, diagnosis change, or new procedure performed?: [x] No    [] Yes (see summary sheet for update)  Subjective functional status/changes:   [] No changes reported  \"I feel okay. \"     OBJECTIVE    20 min Therapeutic Exercise:  [x] See flow sheet :   Rationale: increase ROM and increase strength to improve the patients ability to improve posture with daily tasks    10 min Manual Therapy: gentle SOR; STM to B UT, scalenes, C/S paraspinals   Rationale: decrease pain, increase ROM, increase tissue extensibility and decrease trigger points to improve ease of holding head up     With   [] TE   [] TA   [] neuro   [] other: Patient Education: [x] Review HEP    [] Progressed/Changed HEP based on:   [] positioning   [] body mechanics   [] transfers   [] heat/ice application    [] other:      Other Objective/Functional Measures: Continued with progressed program.  Focused on Pt maintaining more upright posture with standing exercise. Pain Level (0-10 scale) post treatment: 0/10    ASSESSMENT/Changes in Function: Pt noted mild soreness along R side of neck but no pain after session.     Patient will continue to benefit from skilled PT services to modify and progress therapeutic interventions, address functional mobility deficits, address ROM deficits, address strength deficits, analyze and address soft tissue restrictions, analyze and cue movement patterns, analyze and modify body mechanics/ergonomics, assess and modify postural abnormalities, address imbalance/dizziness and instruct in home and community integration to attain remaining goals. []  See Plan of Care  []  See progress note/recertification  []  See Discharge Summary         Progress towards goals / Updated goals:  Goal: Pt to increase C/S AROM all planes by at least 5 deg to achieve neutral head/neck posture. Status at last note/certification: Flex 40 deg; Ext -5 deg; SBR/SBL 15 deg; RR/RL 10 deg - all with head flexed at 40 deg  Current status: progressing - Flex 40 deg; Ext to neutral with effort; SBR 15 deg; SBL 22 deg; RR 20 deg; RL 30 deg  Goal: Pt to be able to sleep through the night on one pillow without increased pain to assist with neutral posture. Status at last note/certification: sleeps with two thick pillows, head fully flexed  Current status: progressing - sleeps with one pillow folded in half  Goal: Pt to report < 2/10 pain at worst to be able to look up or turn head with ease when performing daily tasks. Status at last note/certification: 5/14 pain at worst  Current status: not met - 4/10 pain at worst  Goal: Pt to report FOTO score of 60 pts to show improved posture, function.   Status at last note/certification: FOTO 48 pts at eval  Current status: progressing- 58     PLAN  [x]  Upgrade activities as tolerated     [x]  Continue plan of care  []  Update interventions per flow sheet       []  Discharge due to:_  []  Other:_      Kisha Rao, PT 8/2/2017  12:48 PM    Future Appointments  Date Time Provider Saeed Tracy   8/3/2017 12:30 PM [address] [facility]   8/7/2017 10:30 AM Mojgan Salazar, PT [facility] [place] [facility]   8/8/2017 12:30 PM [facility] PULM ROOM 1 [facility]   8/9/2017 10:30 AM Kisha Rao, PT [facility] [place] [facility]   8/10/2017 12:30 PM [facility] PUL ROOM 1 [facility]   8/14/2017 10:30 AM Mojgan Salazar, PT [facility]   8/15/2017 12:30 PM [facility] PUL [facility]OM 1 [facility]   8/16/2017 10:30 AM Kisha Rao, PT [facility]   8/17/2017 12:30 PM [facility] PULM ROOM 1 [facility]

## 2017-08-03 ENCOUNTER — APPOINTMENT (OUTPATIENT)
Dept: PULMONOLOGY | Age: 82
End: 2017-08-03
Payer: MEDICARE

## 2017-08-07 ENCOUNTER — HOSPITAL ENCOUNTER (OUTPATIENT)
Dept: PHYSICAL THERAPY | Age: 82
Discharge: HOME | End: 2017-08-07
Payer: MEDICARE

## 2017-08-07 PROCEDURE — 97110 THERAPEUTIC EXERCISES: CPT

## 2017-08-07 PROCEDURE — 97140 MANUAL THERAPY 1/> REGIONS: CPT

## 2017-08-07 NOTE — PROGRESS NOTES
PT DAILY TREATMENT NOTE - [facility]     Patient Name: Marika Nolasco  Date:2017  : 1929  [x]  Patient  Verified  Payor: [place] MEDICARE / Plan: [place] MEDICARE PART A & B / Product Type: Medicare /    In time:1030  Out time:1057  Total Treatment Time (min): 27  Total Timed Codes (min): 27  1:1 Treatment Time ( only): 27   Visit #: 5 of 10    Treatment Area: Cervical pain [M54.2]    SUBJECTIVE  Pain Level (0-10 scale): 0  Any medication changes, allergies to medications, adverse drug reactions, diagnosis change, or new procedure performed?: [x] No    [] Yes (see summary sheet for update)  Subjective functional status/changes:   [] No changes reported  What do you want me to do?     OBJECTIVE    Modality rationale:    Min Type Additional Details    [] Estim:  []Unatt       []IFC  []Premod                        []Other:  []w/ice   []w/heat  Position:  Location:    [] Estim: []Att    []TENS instruct  []NMES                    []Other:  []w/US   []w/ice   []w/heat  Position:  Location:    []  Traction: [] Cervical       []Lumbar                       [] Prone          []Supine                       []Intermittent   []Continuous Lbs:  [] before manual  [] after manual    []  Ultrasound: []Continuous   [] Pulsed                           []1MHz   []3MHz W/cm2:  Location:    []  Iontophoresis with dexamethasone         Location: [] Take home patch   [] In clinic    []  Ice     []  heat  []  Ice massage  []  Laser   []  Anodyne Position:  Location:    []  Laser with stim  []  Other:  Position:  Location:    []  Vasopneumatic Device Pressure:       [] lo [] med [] hi   Temperature: [] lo [] med [] hi   [] Skin assessment post-treatment:  []intact []redness- no adverse reaction    []redness - adverse reaction:     17 min Therapeutic Exercise:  [x] See flow sheet :   Rationale: increase ROM and increase strength to improve the patients ability to improve daily postures    10 min Manual Therapy:  Gentle SOR, gentle STM B UT/cervical paraspinals/scalenes   Rationale: decrease pain, increase ROM, increase tissue extensibility and decrease trigger points to improve ease of driving       With   [] TE   [] TA   [] neuro   [] other: Patient Education: [x] Review HEP    [] Progressed/Changed HEP based on:   [] positioning   [] body mechanics   [] transfers   [] heat/ice application    [] other:      Other Objective/Functional Measures: completed exercises per flow sheet     Pain Level (0-10 scale) post treatment: 0    ASSESSMENT/Changes in Function: Verbal cues to maintain upright head with UBE, bands. Patient will continue to benefit from skilled PT services to modify and progress therapeutic interventions, address functional mobility deficits, address ROM deficits, address strength deficits, analyze and address soft tissue restrictions, analyze and cue movement patterns, analyze and modify body mechanics/ergonomics, assess and modify postural abnormalities, address imbalance/dizziness and instruct in home and community integration to attain remaining goals. []  See Plan of Care  []  See progress note/recertification  []  See Discharge Summary         Progress towards goals / Updated goals:  Goal: Pt to increase C/S AROM all planes by at least 5 deg to achieve neutral head/neck posture. Status at last note/certification: Flex 40 deg; Ext -5 deg; SBR/SBL 15 deg; RR/RL 10 deg - all with head flexed at 40 deg  Current status: progressing - Flex 40 deg; Ext to neutral with effort; SBR 15 deg; SBL 22 deg; RR 20 deg; RL 30 deg  Goal: Pt to be able to sleep through the night on one pillow without increased pain to assist with neutral posture. Status at last note/certification: sleeps with two thick pillows, head fully flexed  Current status: progressing - sleeps with one pillow folded in half  Goal: Pt to report < 2/10 pain at worst to be able to look up or turn head with ease when performing daily tasks.   Status at last note/certification: 7/98 pain at worst  Current status: not met - 4/10 pain at worst  Goal: Pt to report FOTO score of 60 pts to show improved posture, function.   Status at last note/certification: FOTO 48 pts at eval  Current status: progressing- 62    PLAN  [x]  Upgrade activities as tolerated     [x]  Continue plan of care  []  Update interventions per flow sheet       []  Discharge due to:_  []  Other:_      Helena Gibbs, PT 8/7/2017  7:14 AM    Future Appointments  Date Time Provider Saeed Trayc   8/7/2017 10:30 AM [facility]   8/8/2017 12:30 PM [facility] PUL ROOM 1 [facility]   8/9/2017 10:30 AM Kisha Rao, PT [facility] [place] [facility]   8/10/2017 12:30 PM [facility]UL [facility]OM 1 [facility]   8/14/2017 10:30 AM Helena Gibbs, PT [facility]   8/15/2017 12:30 PM [facility] PUL [facility]OM 1 [facility]   8/16/2017 10:30 AM Kisha Rao PT [facility]   8/17/2017 12[facility]30 PM [facility]UL [facility]

## 2017-08-08 ENCOUNTER — HOSPITAL ENCOUNTER (OUTPATIENT)
Dept: PULMONOLOGY | Age: 82
End: 2017-08-08
Payer: MEDICARE

## 2017-08-08 ENCOUNTER — APPOINTMENT (OUTPATIENT)
Dept: PULMONOLOGY | Age: 82
End: 2017-08-08
Payer: MEDICARE

## 2017-08-09 ENCOUNTER — HOSPITAL ENCOUNTER (OUTPATIENT)
Dept: PHYSICAL THERAPY | Age: 82
Discharge: HOME | End: 2017-08-09
Payer: MEDICARE

## 2017-08-09 PROCEDURE — 97112 NEUROMUSCULAR REEDUCATION: CPT

## 2017-08-09 PROCEDURE — 97140 MANUAL THERAPY 1/> REGIONS: CPT

## 2017-08-09 NOTE — PROGRESS NOTES
PT DAILY TREATMENT NOTE - [facility]     Patient Name: Jewell Lehman  Date:2017  : 1929  [x]  Patient  Verified  Payor: Nikki Mccain / Plan: [place] / Product Type: Medicare /    In time:10:32  Out time:11:10  Total Treatment Time (min): 38  Total Timed Codes (min): 38  1:1 Treatment Time ([facility] only): 38  Visit #: 6 of 10    Treatment Area: Cervical pain [M54.2]    SUBJECTIVE  Pain Level (0-10 scale): 0/10  Any medication changes, allergies to medications, adverse drug reactions, diagnosis change, or new procedure performed?: [x] No    [] Yes (see summary sheet for update)  Subjective functional status/changes:   [] No changes reported  \"I feel okay. No pain. \"    OBJECTIVE    28 min Therapeutic Exercise:  [x] See flow sheet :   Rationale: increase ROM and increase strength to improve the patients ability to improve posture    10 min Manual Therapy:  Gentle SOR, gentle STM B UT/cervical paraspinals/scalenes   Rationale: decrease pain, increase ROM, increase tissue extensibility and decrease trigger points to improve ease of checking traffic while driving       With   [] TE   [] TA   [] neuro   [] other: Patient Education: [x] Review HEP    [] Progressed/Changed HEP based on:   [] positioning   [] body mechanics   [] transfers   [] heat/ice application    [] other:      Other Objective/Functional Measures: completed exercises per flow sheet     Pain Level (0-10 scale) post treatment: 0/10    ASSESSMENT/Changes in Function: Pt continues to require verbal cueing to maintain erect posture when on UBE and performing bands.      Patient will continue to benefit from skilled PT services to modify and progress therapeutic interventions, address functional mobility deficits, address ROM deficits, address strength deficits, analyze and address soft tissue restrictions, analyze and cue movement patterns, analyze and modify body mechanics/ergonomics, assess and modify postural abnormalities, address imbalance/dizziness and instruct in home and community integration to attain remaining goals. []  See Plan of Care  []  See progress note/recertification  []  See Discharge Summary         Progress towards goals / Updated goals:  Goal: Pt to increase C/S AROM all planes by at least 5 deg to achieve neutral head/neck posture. Status at last note/certification: Flex 40 deg; Ext -5 deg; SBR/SBL 15 deg; RR/RL 10 deg - all with head flexed at 40 deg  Current status: progressing - Flex 45 deg; Ext 3 deg; SBR 18 deg; SBL 25 deg; RR 20 deg; RL 30 deg  Goal: Pt to be able to sleep through the night on one pillow without increased pain to assist with neutral posture. Status at last note/certification: sleeps with two thick pillows, head fully flexed  Current status: progressing - sleeps with one pillow folded in half  Goal: Pt to report < 2/10 pain at worst to be able to look up or turn head with ease when performing daily tasks. Status at last note/certification: 4/59 pain at worst  Current status: met - 2/10 pain at worst  Goal: Pt to report FOTO score of 60 pts to show improved posture, function.   Status at last note/certification: FOTO 48 pts at eval  Current status: progressing - FOTO 49 pts (no decline in function)    PLAN  [x]  Upgrade activities as tolerated     [x]  Continue plan of care  []  Update interventions per flow sheet       []  Discharge due to:_  []  Other:_      Gilda Denton, PT 8/9/2017  7:14 AM    Future Appointments  Date Time Provider Saeed Tracy   8/14/2017 10:30 AM Oswaldo Trejo, PT [facility]   8/16/2017 10:30 AM Kisha Rao, PT [facility]un[facility] Felipe

## 2017-08-10 ENCOUNTER — APPOINTMENT (OUTPATIENT)
Dept: PULMONOLOGY | Age: 82
End: 2017-08-10
Payer: MEDICARE

## 2017-08-14 ENCOUNTER — HOSPITAL ENCOUNTER (OUTPATIENT)
Dept: PHYSICAL THERAPY | Age: 82
Discharge: HOME | End: 2017-08-14
Payer: MEDICARE

## 2017-08-14 PROCEDURE — 97110 THERAPEUTIC EXERCISES: CPT

## 2017-08-14 PROCEDURE — 97140 MANUAL THERAPY 1/> REGIONS: CPT

## 2017-08-14 NOTE — PROGRESS NOTES
PT DAILY TREATMENT NOTE - [facility]     Patient Name: Bouchra Payan  Date:2017  : 1929  [x]  Patient  Verified  Payor: Richard Felton / Plan: [place] MEDICARE PART A & B / Product Type: Medicare /    In time:1030  Out time:1100  Total Treatment Time (min): 30  Total Timed Codes (min): 30  1:1 Treatment Time ( [address] only): 30   Visit #: 7 of 10    Treatment Area: Cervical pain [M54.2]    SUBJECTIVE  Pain Level (0-10 scale): 0  Any medication changes, allergies to medications, adverse drug reactions, diagnosis change, or new procedure performed?: [x] No    [] Yes (see summary sheet for update)  Subjective functional status/changes:   [] No changes reported  No pain.      OBJECTIVE    Modality rationale:    Min Type Additional Details    [] Estim:  []Unatt       []IFC  []Premod                        []Other:  []w/ice   []w/heat  Position:  Location:    [] Estim: []Att    []TENS instruct  []NMES                    []Other:  []w/US   []w/ice   []w/heat  Position:  Location:    []  Traction: [] Cervical       []Lumbar                       [] Prone          []Supine                       []Intermittent   []Continuous Lbs:  [] before manual  [] after manual    []  Ultrasound: []Continuous   [] Pulsed                           []1MHz   []3MHz W/cm2:  Location:    []  Iontophoresis with dexamethasone         Location: [] Take home patch   [] In clinic    []  Ice     []  heat  []  Ice massage  []  Laser   []  Anodyne Position:  Location:    []  Laser with stim  []  Other:  Position:  Location:    []  Vasopneumatic Device Pressure:       [] lo [] med [] hi   Temperature: [] lo [] med [] hi   [] Skin assessment post-treatment:  []intact []redness- no adverse reaction    []redness - adverse reaction:     20 min Therapeutic Exercise:  [x] See flow sheet :   Rationale: increase ROM and increase strength to improve the patients ability to improve posture    10 min Manual Therapy:  Gentle SOR, gentle cervical manual stretching, STM B cervical paraspinals/scalenes   Rationale: decrease pain, increase ROM, increase tissue extensibility and decrease trigger points to improve ease of driving, cervical mobility        With   [] TE   [] TA   [] neuro   [] other: Patient Education: [x] Review HEP    [] Progressed/Changed HEP based on:   [] positioning   [] body mechanics   [] transfers   [] heat/ice application    [] other:      Other Objective/Functional Measures: added wall push ups     Pain Level (0-10 scale) post treatment: 0    ASSESSMENT/Changes in Function: Will plan to discharge next visit per certification period/no noticeable improvement in posture. Patient will continue to benefit from skilled PT services to modify and progress therapeutic interventions, address functional mobility deficits, address ROM deficits, address strength deficits, analyze and address soft tissue restrictions, analyze and cue movement patterns, analyze and modify body mechanics/ergonomics, assess and modify postural abnormalities, address imbalance/dizziness and instruct in home and community integration to attain remaining goals. []  See Plan of Care  []  See progress note/recertification  []  See Discharge Summary         Progress towards goals / Updated goals:  Goal: Pt to increase C/S AROM all planes by at least 5 deg to achieve neutral head/neck posture. Status at last note/certification: Flex 40 deg; Ext -5 deg; SBR/SBL 15 deg; RR/RL 10 deg - all with head flexed at 40 deg  Current status: progressing - Flex 45 deg; Ext 3 deg; SBR 18 deg; SBL 25 deg; RR 20 deg; RL 30 deg  Goal: Pt to be able to sleep through the night on one pillow without increased pain to assist with neutral posture.   Status at last note/certification: sleeps with two thick pillows, head fully flexed  Current status: progressing - sleeps with one pillow folded in half  Goal: Pt to report < 2/10 pain at worst to be able to look up or turn head with ease when performing daily tasks.  Status at last note/certification: 7/29 pain at worst  Current status: met - 2/10 pain at worst  Goal: Pt to report FOTO score of 60 pts to show improved posture, function.   Status at last note/certification: FOTO 48 pts at eval  Current status: progressing - FOTO 49 pts (no decline in function)    PLAN  [x]  Upgrade activities as tolerated     [x]  Continue plan of care  []  Update interventions per flow sheet       []  Discharge due to:_  []  Other:_      Ignacia Hill, PT 8/14/2017  7:16 AM    Future Appointments  Date Time Provider Saeed Tracy   8/14/2017 10:30 AM Ignacia Hill, PT [facility]   8/16/2017 10:30 AM Kisha Rao, STEVE Milo Neptali

## 2017-08-15 ENCOUNTER — APPOINTMENT (OUTPATIENT)
Dept: PULMONOLOGY | Age: 82
End: 2017-08-15
Payer: MEDICARE

## 2017-08-16 ENCOUNTER — HOSPITAL ENCOUNTER (OUTPATIENT)
Dept: PHYSICAL THERAPY | Age: 82
Discharge: HOME | End: 2017-08-16
Payer: MEDICARE

## 2017-08-16 PROCEDURE — 97110 THERAPEUTIC EXERCISES: CPT

## 2017-08-16 PROCEDURE — G8982 BODY POS GOAL STATUS: HCPCS

## 2017-08-16 PROCEDURE — 97140 MANUAL THERAPY 1/> REGIONS: CPT

## 2017-08-16 PROCEDURE — G8983 BODY POS D/C STATUS: HCPCS

## 2017-08-16 NOTE — PROGRESS NOTES
PT DISCHARGE DAILY NOTE AND [id number]    Date:2017   Patient name: María Bear Start of Care: 2017   Referral source: Sarahi Romano DO : 1929                         Medical Diagnosis: Cervical pain [M54.2] Onset Date:17                         Treatment Diagnosis: Torticollis, Neck Pain   Prior Hospitalization: see medical history Provider#: [id number]   Medications: Verified on Patient summary List    Comorbidities: Heart disease; Arthirits; Osteoporosis; Thyroid problems; COPD; visually impaired - glasses; hearing impaired - hearing aids; hx of skin CA   Prior Level of Function: Lives with spouse; retired; manages household, yardwork with spouse    Vis[facility] from [facility]: 12    Missed Visits: 1    Reporting Period : 17 to 17    [x]  Patient  Verified  Payor: [place] MEDICARE / Plan: [place] MEDICARE PART A & B / Product Type: Medicare /    In time:10:30  Out time:11:10  Total Treatment Time (min): 40  Total Timed Codes (min): 40  1:1 Treatment Time ( [address] only): 40   Visit #: 8 of 10    SUBJECTIVE  Pain Level (0-10 scale): 0/10  Any medication changes, allergies to medications, adverse drug reactions, diagnosis change, or new procedure performed?: [x] No    [] Yes (see summary sheet for update)  Subjective functional status/changes:   [] No changes reported  \"I don't have any pain, just some soreness in the back of my neck. \"    OBJECTIVE    30 min Therapeutic Exercise:  [] See flow sheet :   Rationale: increase ROM and increase strength to improve the patients ability to maintain erect posture    10 min Manual Therapy:  Supine SOR, STM to B UT   Rationale: increase tissue extensibility and decrease trigger points to improve cervical mobility to turn head and maintain erect posture          With   [] TE   [] TA   [] neuro   [] other: Patient Education: [x] Review HEP    [] Progressed/Changed HEP based on:   [] positioning   [] body mechanics   [] transfers   [] heat/ice application [] other:      Other Objective/Functional Measures: Continued with Rx program.  Updated HEP and gave to Pt with BTB. Pain Level (0-10 scale) post treatment: 0/10    Summary of Care:  Goal: Pt to increase C/S AROM all planes by at least 5 deg to achieve neutral head/neck posture. Status at last note/certification: Flex 40 deg; Ext -5 deg; SBR/SBL 15 deg; RR/RL 10 deg - all with head flexed at 40 deg  Current status: progressing - Flex 45 deg; Ext 3 deg; SBR 18 deg; SBL 25 deg; RR 20 deg; RL 30 deg  Goal: Pt to be able to sleep through the night on one pillow without increased pain to assist with neutral posture. Status at last note/certification: sleeps with two thick pillows, head fully flexed  Current status: progressing - sleeps with one pillow folded in half  Goal: Pt to report < 2/10 pain at worst to be able to look up or turn head with ease when performing daily tasks. Status at last note/certification: 6/43 pain at worst  Current status: met - 2/10 pain at worst  Goal: Pt to report FOTO score of 60 pts to show improved posture, function. Status at last note/certification: FOTO 48 pts at eval  Current status: progressing - FOTO 49 pts (no decline in function)    ASSESSMENT/Changes in Function: Pt made progress with PT, gaining a few degrees of cervical AROM and being able to achieve neutral spinal posture. Pt unable to maintain, continuing to hold head in 40 deg of cervical flexion. Pt reports minimal pain levels on average. Pt is appropriate for D/C at this time to continue to self-manage care at home with HEP and gym routine. G-Codes (GP)  Position  [id number] Goal  CK= 40-59%  [id number] D/C  CK= 40-59%    The severity rating is based on clinical judgment and the FOTO score.       Thank you for this referral!     PLAN  [x]Discontinue therapy: [x]Patient has reached or is progressing toward set goals      []Patient is non-compliant or has abdicated      []Due to lack of appreciable progress towards set goals    Kisha Rao, PT 8/16/2017  10:41 AM

## 2017-08-17 ENCOUNTER — APPOINTMENT (OUTPATIENT)
Dept: PULMONOLOGY | Age: 82
End: 2017-08-17
Payer: MEDICARE

## 2017-08-31 ENCOUNTER — HOSPITAL ENCOUNTER (EMERGENCY)
Age: 82
Discharge: HOME | End: 2017-09-01
Attending: EMERGENCY MEDICINE | Admitting: EMERGENCY MEDICINE
Payer: MEDICARE

## 2017-08-31 DIAGNOSIS — I83.891 BLEEDING FROM VARICOSE VEIN, RIGHT: Primary | ICD-10-CM

## 2017-08-31 PROCEDURE — 75810000215 HC WOUND REPAIR OTHER

## 2017-08-31 PROCEDURE — 77030031132 HC SUT NYL COVD -A

## 2017-08-31 PROCEDURE — 99284 EMERGENCY DEPT VISIT MOD MDM: CPT

## 2017-09-01 VITALS — SYSTOLIC BLOOD PRESSURE: 109 MMHG | DIASTOLIC BLOOD PRESSURE: 63 MMHG | OXYGEN SATURATION: 97 % | TEMPERATURE: 97.4 F

## 2017-09-01 PROCEDURE — 77030031132 HC SUT NYL COVD -A

## 2017-09-01 PROCEDURE — 74011000250 HC RX REV CODE- 250: Performed by: PHYSICIAN ASSISTANT

## 2017-09-01 PROCEDURE — 75810000215 HC WOUND REPAIR OTHER

## 2017-09-01 RX ORDER — LIDOCAINE HYDROCHLORIDE AND EPINEPHRINE 10; 10 MG/ML; UG/ML
10 INJECTION, SOLUTION INFILTRATION; PERINEURAL ONCE
Status: COMPLETED | OUTPATIENT
Start: 2017-09-01 | End: 2017-09-01

## 2017-09-01 RX ADMIN — LIDOCAINE HYDROCHLORIDE,EPINEPHRINE BITARTRATE 100 MG: 10; .01 INJECTION, SOLUTION INFILTRATION; PERINEURAL at 02:09

## 2017-09-01 NOTE — ED NOTES
Pt resting on stretcher comfortably with wife at bedside. Pt has no complaints of pain at this time.

## 2017-09-01 NOTE — DISCHARGE INSTRUCTIONS
Varicose Veins: Care Instructions  Your Care Instructions  Varicose veins are twisted, enlarged veins near the surface of the skin. They develop most often in the legs and ankles. Some people may be more likely than others to get varicose veins because of aging or hormone changes or because a parent has them. Being overweight or pregnant can make varicose veins worse. Jobs that require standing for long periods of time also can make them worse. Follow-up care is a key part of your treatment and safety. Be sure to make and go to all appointments, and call your doctor if you are having problems. It's also a good idea to know your test results and keep a list of the medicines you take. How can you care for yourself at home? · Wear compression stockings during the day to help relieve symptoms. They improve blood flow and are the main treatment for varicose veins. Talk to your doctor about which ones to get and where to get them. · Prop up your legs at or above the level of your heart when possible. This helps keep the blood from pooling in your lower legs and improves blood flow to the rest of your body. · Avoid sitting and standing for long periods. This puts added stress on your veins. · Get regular exercise, and control your weight. Walk, bicycle, or swim to improve blood flow in your legs. · If you bump your leg so hard that you know it is likely to bruise, prop up your leg and put ice or a cold pack on the area for 10 to 20 minutes at a time. Try to do this every 1 to 2 hours for the next 3 days (when you are awake) or until the swelling goes down. Put a thin cloth between the ice and your skin. · If you cut or scratch the skin over a vein, it may bleed a lot. Prop up your leg and apply firm pressure with a clean bandage over the site of the bleeding. Continue to apply pressure for a full 15 minutes. Do not check sooner to see if the bleeding has stopped.  If the bleeding has not stopped after 15 minutes, apply pressure again for another 15 minutes. You can repeat this up to 3 times for a total of 45 minutes. If you have a blood clot in a varicose vein, you may have tenderness and swelling over the vein. The vein may feel firm. Be sure to call your doctor right away if you have these symptoms. If your doctor has told you how to care for the clot, follow his or her instructions. Care may include the following:  · Prop up your leg and apply heat with a warm, damp cloth or a heating pad set on low (put a towel or cloth between your leg and the heating pad to prevent burns). · Ask your doctor if you can take an over-the-counter pain medicine, such as acetaminophen (Tylenol), ibuprofen (Advil, Motrin), or naproxen (Aleve). Be safe with medicines. Read and follow all instructions on the label. When should you call for help? Call 911 anytime you think you may need emergency care. For example, call if:  · You have sudden chest pain and shortness of breath, or you cough up blood. Call your doctor now or seek immediate medical care if:  · You have signs of a blood clot, such as:  ¨ Pain in your calf, back of the knee, thigh, or groin. ¨ Redness and swelling in your leg or groin. · A varicose vein begins to bleed and you cannot stop it. · You have a tender lump in your leg. · You get an open sore. Watch closely for changes in your health, and be sure to contact your doctor if:  · Your varicose vein symptoms do not improve with home treatment. Where can you learn more? Go to http://delmi-andres.info/. Enter G250 in the search box to learn more about \"Varicose Veins: Care Instructions. \"  Current as of: March 20, 2017  Content Version: 11.3  © 6174-7815 [employer]. Care instructions adapted under license by [employer] (which disclaims liability or warranty for this information).  If you have questions about a medical condition or this instruction, always ask your healthcare professional. [address] any warranty or liability for your use of this information.

## 2018-07-11 ENCOUNTER — APPOINTMENT (OUTPATIENT)
Dept: GENERAL RADIOLOGY | Age: 83
End: 2018-07-11
Attending: EMERGENCY MEDICINE
Payer: MEDICARE

## 2018-07-11 ENCOUNTER — HOSPITAL ENCOUNTER (EMERGENCY)
Age: 83
Discharge: HOME | End: 2018-07-11
Attending: EMERGENCY MEDICINE
Payer: MEDICARE

## 2018-07-11 VITALS
SYSTOLIC BLOOD PRESSURE: 131 MMHG | DIASTOLIC BLOOD PRESSURE: 57 MMHG | RESPIRATION RATE: 29 BRPM | HEART RATE: 77 BPM | OXYGEN SATURATION: 98 % | TEMPERATURE: 98.7 F

## 2018-07-11 DIAGNOSIS — J44.1 ACUTE EXACERBATION OF CHRONIC OBSTRUCTIVE PULMONARY DISEASE (COPD) (HCC): Primary | ICD-10-CM

## 2018-07-11 LAB
ALBUMIN SERPL-MCNC: 3.4 G/DL (ref 3.4–5)
ALBUMIN/GLOB SERPL: 0.8 {RATIO} (ref 0.8–1.7)
ALP SERPL-CCNC: 197 U/L (ref 45–117)
ALT SERPL-CCNC: 78 U/L (ref 16–61)
ANION GAP SERPL CALC-SCNC: 8 MMOL/L (ref 3–18)
AST SERPL-CCNC: 48 U/L (ref 15–37)
ATRIAL RATE: 87 BPM
BASOPHILS # BLD: 0 K/UL (ref 0–0.1)
BASOPHILS NFR BLD: 0 % (ref 0–2)
BILIRUB SERPL-MCNC: 0.4 MG/DL (ref 0.2–1)
BUN SERPL-MCNC: 39 MG/DL (ref 7–18)
BUN/CREAT SERPL: 30 (ref 12–20)
CALCIUM SERPL-MCNC: 8.7 MG/DL (ref 8.5–10.1)
CALCULATED R AXIS, ECG10: 71 DEGREES
CALCULATED T AXIS, ECG11: 61 DEGREES
CHLORIDE SERPL-SCNC: 105 MMOL/L (ref 100–108)
CK MB CFR SERPL CALC: 2.7 % (ref 0–4)
CK MB SERPL-MCNC: 7.5 NG/ML (ref 5–25)
CK SERPL-CCNC: 276 U/L (ref 39–308)
CO2 SERPL-SCNC: 30 MMOL/L (ref 21–32)
CREAT SERPL-MCNC: 1.31 MG/DL (ref 0.6–1.3)
DIAGNOSIS, 93000: NORMAL
DIFFERENTIAL METHOD BLD: ABNORMAL
EOSINOPHIL # BLD: 0 K/UL (ref 0–0.4)
EOSINOPHIL NFR BLD: 0 % (ref 0–5)
ERYTHROCYTE [DISTWIDTH] IN BLOOD BY AUTOMATED COUNT: 14 % (ref 11.6–14.5)
GLOBULIN SER CALC-MCNC: 4.3 G/DL (ref 2–4)
GLUCOSE SERPL-MCNC: 154 MG/DL (ref 74–99)
HCT VFR BLD AUTO: 39.2 % (ref 36–48)
HGB BLD-MCNC: 13.2 G/DL (ref 13–16)
LYMPHOCYTES # BLD: 2 K/UL (ref 0.9–3.6)
LYMPHOCYTES NFR BLD: 15 % (ref 21–52)
MCH RBC QN AUTO: 31.7 PG (ref 24–34)
MCHC RBC AUTO-ENTMCNC: 33.7 G/DL (ref 31–37)
MCV RBC AUTO: 94 FL (ref 74–97)
MONOCYTES # BLD: 1.6 K/UL (ref 0.05–1.2)
MONOCYTES NFR BLD: 12 % (ref 3–10)
NEUTS SEG # BLD: 9.7 K/UL (ref 1.8–8)
NEUTS SEG NFR BLD: 73 % (ref 40–73)
PLATELET # BLD AUTO: 248 K/UL (ref 135–420)
PMV BLD AUTO: 11.2 FL (ref 9.2–11.8)
POTASSIUM SERPL-SCNC: 3.9 MMOL/L (ref 3.5–5.5)
PROT SERPL-MCNC: 7.7 G/DL (ref 6.4–8.2)
Q-T INTERVAL, ECG07: 390 MS
QRS DURATION, ECG06: 68 MS
QTC CALCULATION (BEZET), ECG08: 453 MS
RBC # BLD AUTO: 4.17 M/UL (ref 4.7–5.5)
SODIUM SERPL-SCNC: 143 MMOL/L (ref 136–145)
TROPONIN I SERPL-MCNC: <0.02 NG/ML (ref 0–0.04)
VENTRICULAR RATE, ECG03: 81 BPM
WBC # BLD AUTO: 13.3 K/UL (ref 4.6–13.2)

## 2018-07-11 PROCEDURE — 99284 EMERGENCY DEPT VISIT MOD MDM: CPT

## 2018-07-11 PROCEDURE — 85025 COMPLETE CBC W/AUTO DIFF WBC: CPT | Performed by: EMERGENCY MEDICINE

## 2018-07-11 PROCEDURE — 82553 CREATINE MB FRACTION: CPT | Performed by: EMERGENCY MEDICINE

## 2018-07-11 PROCEDURE — 93005 ELECTROCARDIOGRAM TRACING: CPT

## 2018-07-11 PROCEDURE — 77030029684 HC NEB SM VOL KT MONA -A

## 2018-07-11 PROCEDURE — 71045 X-RAY EXAM CHEST 1 VIEW: CPT

## 2018-07-11 PROCEDURE — 94640 AIRWAY INHALATION TREATMENT: CPT

## 2018-07-11 PROCEDURE — 74011000250 HC RX REV CODE- 250: Performed by: EMERGENCY MEDICINE

## 2018-07-11 PROCEDURE — 96375 TX/PRO/DX INJ NEW DRUG ADDON: CPT

## 2018-07-11 PROCEDURE — 74011250636 HC RX REV CODE- 250/636: Performed by: EMERGENCY MEDICINE

## 2018-07-11 PROCEDURE — 80053 COMPREHEN METABOLIC PANEL: CPT | Performed by: EMERGENCY MEDICINE

## 2018-07-11 PROCEDURE — 96365 THER/PROPH/DIAG IV INF INIT: CPT

## 2018-07-11 RX ORDER — LEVOFLOXACIN 5 MG/ML
500 INJECTION, SOLUTION INTRAVENOUS
Status: COMPLETED | OUTPATIENT
Start: 2018-07-11 | End: 2018-07-11

## 2018-07-11 RX ORDER — IPRATROPIUM BROMIDE AND ALBUTEROL SULFATE 2.5; .5 MG/3ML; MG/3ML
3 SOLUTION RESPIRATORY (INHALATION) ONCE
Status: COMPLETED | OUTPATIENT
Start: 2018-07-11 | End: 2018-07-11

## 2018-07-11 RX ORDER — PREDNISONE 20 MG/1
60 TABLET ORAL DAILY
Qty: 15 TAB | Refills: 0 | Status: SHIPPED | OUTPATIENT
Start: 2018-07-11 | End: 2018-07-16

## 2018-07-11 RX ORDER — LEVOFLOXACIN 500 MG/1
500 TABLET, FILM COATED ORAL DAILY
Qty: 10 TAB | Refills: 0 | Status: SHIPPED | OUTPATIENT
Start: 2018-07-11 | End: 2018-07-21

## 2018-07-11 RX ADMIN — METHYLPREDNISOLONE SODIUM SUCCINATE 125 MG: 125 INJECTION, POWDER, FOR SOLUTION INTRAMUSCULAR; INTRAVENOUS at 02:16

## 2018-07-11 RX ADMIN — LEVOFLOXACIN 500 MG: 5 INJECTION, SOLUTION INTRAVENOUS at 02:12

## 2018-07-11 RX ADMIN — IPRATROPIUM BROMIDE AND ALBUTEROL SULFATE 3 ML: .5; 3 SOLUTION RESPIRATORY (INHALATION) at 02:12

## 2018-07-11 NOTE — ED TRIAGE NOTES
Patient arrived via EMS complaining of difficulty breathing. Per the patient he has been outside working in the yard this week and has been taking allergy medication for congestion. Patient then reports that he has a hx of irregular heartbeat, and that that has been acting up this week as well.

## 2018-07-11 NOTE — ED NOTES
I have reviewed discharge instructions with the patient and spouse. The patient and spouse verbalized understanding. Patient provided two prescriptions and instructed on use. Patient escorted to personal vehicle via wheelchair by ED staff.

## 2018-07-11 NOTE — ED PROVIDER NOTES
[facility] EMERGENCY DEPT      1:56 AM    Date: 7/11/2018  Patient Name: Flori Lopez    History of Presenting Illness     Chief Complaint   Patient presents with    Shortness of Breath       History Provided By: Patient and Patient's Wife    Chief Complaint: Shortness of Breath  Duration: 1 Weeks  Timing:  Worsening  Location: Lungs  Quality: N/A  Severity: Moderate  Modifying Factors: Nothing made it better or worse  Associated Symptoms: denies any other associated signs or symptoms    80 y.o. male with noted past medical history of A fib, heart disease, and cardiac arrhythmia who presents to the emergency department with worsening shortness of breath. Patient has been having a cough and worsening SOB. He said that he went to an urgent care facility last week for his cough and SOB. Patient was given prednisone by the urgent care facility. Patient's wife said that he has had open heart surgery. He said that he is not having CP and does not have a history of CHF. Patient states that he is having some congestion. He said that he used to be a smoker but does not smoke anymore. Nothing makes it better or worse. No other concerns or symptoms at this time. No other complaints. Nursing notes regarding the HPI and triage nursing notes were reviewed. Prior medical records were reviewed. Current Outpatient Prescriptions   Medication Sig Dispense Refill    levoFLOXacin (LEVAQUIN) 500 mg tablet Take 1 Tab by mouth daily for 10 days. 10 Tab 0    predniSONE (DELTASONE) 20 mg tablet Take 3 Tabs by mouth daily for 5 days. With Breakfast 15 Tab 0    alendronate-vitamin d3 70-2,800 mg-unit per tablet Take 1 Tab by mouth every seven (7) days.  simvastatin (ZOCOR) 20 mg tablet Take  by mouth nightly.  aspirin 81 mg tablet Take 81 mg by mouth.  multivitamin (ONE A DAY) tablet Take 1 Tab by mouth daily.            Past History     Past Medical History:  Past Medical History:   Diagnosis Date  Actinic keratosis     Arthropathy, unspecified, site unspecified     Atrial fibrillation (HCC)     Cardiac arrhythmia     Elevated prostate specific antigen (PSA)     Hearing reduced     Heart disease     Hemorrhoid     Hypertrophy of prostate with urinary obstruction and other lower urinary tract symptoms (LUTS)     Impotence of organic origin     Leg pain     Malignant neoplasm of skin of face 11/19/2007    Other malignant neoplasm without specification of site [facility])     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    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 8/11/08    Prostate nodule     Thyroid disease     Unspecified joint replacement status     Urinary frequency     Vision decreased        Past Surgical History:  Past Surgical History:   Procedure Laterality Date    HX CORONARY ARTERY BYPASS GRAFT  3/12    HX HERNIA REPAIR      HX HIP REPLACEMENT  '60 & '06    x 2    HX OTHER SURGICAL  10/9/12    [facility]       Family History:  Family History   Problem Relation Age of Onset    Cancer Mother     Emphysema Father        Social History:  Social History   Substance Use Topics    Smoking status: Former Smoker     Quit date: 1/1/1990    Smokeless tobacco: Never Used    Alcohol use Yes      Comment: social       Allergies:  No Known Allergies    Patient's primary care provider (as noted in EPIC):  Mikey Clement MD    Review of Systems   Constitutional: Negative for diaphoresis. HENT: Positive for congestion. Eyes: Negative for discharge. Respiratory: Positive for cough and shortness of breath. Negative for stridor. Cardiovascular: Negative for chest pain and palpitations. Gastrointestinal: Negative for diarrhea. Genitourinary: Negative for flank pain. Musculoskeletal: Negative for back pain. Neurological: Negative for weakness. Psychiatric/Behavioral: Negative for hallucinations.    All other systems reviewed and are negative. Visit Vitals    /57 (BP 1 Location: Right arm, BP Patient Position: At rest)    Pulse 77    Temp 98.7 °F (37.1 °C)    Resp 29    SpO2 98%       PHYSICAL EXAM:    CONSTITUTIONAL:  Alert, in no apparent distress;  well developed;  well nourished. HEAD:  Normocephalic, atraumatic. EYES:  EOMI. Non-icteric sclera. Normal conjunctiva. ENTM:  Nose:  no rhinorrhea. Throat:  no erythema or exudate, mucous membranes moist.  NECK:  No JVD. Supple    RESPIRATORY:   Diffuse whole expiratory wheeze, but good air movement. CARDIOVASCULAR:  Regular rate and rhythm. No murmurs, rubs, or gallops. GI:  Normal bowel sounds, abdomen soft and non-tender. No rebound or guarding. BACK:  Non-tender. UPPER EXT:  Normal inspection. LOWER EXT:  No edema, no calf tenderness. Distal pulses intact. NEURO:  Moves all four extremities, and grossly normal motor exam.  SKIN:  No rashes;  Normal for age. PSYCH:  Alert and normal affect. DIFFERENTIAL DIAGNOSES/ MEDICAL DECISION MAKING:   Shortness of breath etiologies include chronic obstructive pulmonary disease (COPD), acute asthma exacerbation, congestive heart failure, pneumonia, acute bronchitis, pulmonary embolism, upper respiratory infection, cardiac event to include acute coronary syndrome, acute myocardial infarction or a combination of the above (ex URI on top of COPD thus causing respiratory distress). Diagnostic Study Results     Abnormal lab results from this emergency department encounter:  Labs Reviewed   CBC WITH AUTOMATED DIFF - Abnormal; Notable for the following:        Result Value    WBC 13.3 (*)     RBC 4.17 (*)     LYMPHOCYTES 15 (*)     MONOCYTES 12 (*)     ABS. NEUTROPHILS 9.7 (*)     ABS.  MONOCYTES 1.6 (*)     All other components within normal limits   METABOLIC PANEL, COMPREHENSIVE - Abnormal; Notable for the following:     Glucose 154 (*)     BUN 39 (*)     Creatinine 1.31 (*)     BUN/Creatinine ratio 30 (*)     GFR est non-AA 52 (*)     ALT (SGPT) 78 (*)     AST (SGOT) 48 (*)     Alk. phosphatase 197 (*)     Globulin 4.3 (*)     All other components within normal limits   CARDIAC PANEL,(CK, CKMB & TROPONIN) - Abnormal; Notable for the following:     CK - MB 7.5 (*)     All other components within normal limits       Lab values for this patient within approximately the last 12 hours:  No results found for this or any previous visit (from the past 12 hour(s)). Radiologist and cardiologist interpretations if available at time of this note:  No results found. Portable (A-P view) CXR:  Preliminary review of x-rays by ED Physician. Interpretation of chest X-ray shows, no infiltrates, no pneumothorax, no CHF, no effusion. COPD. Medication(s) ordered for patient during this emergency visit encounter:  Medications   albuterol-ipratropium (DUO-NEB) 2.5 MG-0.5 MG/3 ML (3 mL Nebulization Given 7/11/18 0212)   albuterol-ipratropium (DUO-NEB) 2.5 MG-0.5 MG/3 ML (3 mL Nebulization Given 7/11/18 0212)   albuterol-ipratropium (DUO-NEB) 2.5 MG-0.5 MG/3 ML (3 mL Nebulization Given 7/11/18 0212)   methylPREDNISolone (PF) (SOLU-MEDROL) injection 125 mg (125 mg IntraVENous Given 7/11/18 0216)   levoFLOXacin (LEVAQUIN) 500 mg in D5W IVPB (0 mg IntraVENous IV Completed 7/11/18 1266)       Medical Decision Making     I am the first provider for this patient. I reviewed the vital signs, available nursing notes, past medical history, past surgical history, family history and social history. Vital Signs:  Reviewed the patient's vital signs. IMPRESSION AND MEDICAL DECISION MAKING:  Based upon the patient's presentation with noted HPI and PE, along with the work up done in the emergency department, I believe that the patient is having an acute COPD exacerbation. The patients respiratory status improved in the emergency department with noted HHN treatments, steroids, and other noted modalities and medications.   I believe that the patient has improved sufficiently enough for discharge home. DIAGNOSIS:    1. Acute COPD exacerbation. SPECIFIC PATIENT INSTRUCTIONS FROM THE PHYSICIAN WHO TREATED YOU IN THE ER TODAY:  1. Return if any concerns or worsening of condition(s)  2. Levaquin and steroids as prescribed until finished. 3. Use the albuterol inhaler as prescribed for wheezing and/or cough. 4. FOLLOW UP APPOINTMENT:  Your primary doctor in 1-2 days. Patient is improved, resting quietly and comfortably. The patient will be discharged home. The patient was reassured that these symptoms do not appear to represent a serious or life threatening condition at this time. Warning signs of worsening condition were discussed and understood by the patient. Based on patient's age, coexisting illness, exam, and the results of this ED evaluation, the decision to treat as an outpatient was made. Based on the information available at time of discharge, acute pathology requiring immediate intervention was deemed relative unlikely. While it is impossible to completely exclude the possibility of underlying serious disease or worsening of condition, I feel the relative likelihood is extremely low. I discussed this uncertainty with the patient, who understood ED evaluation and treatment and felt comfortable with the outpatient treatment plan. All questions regarding care, test results, and follow up were answered. The patient is stable and appropriate to discharge. They understand that they should return to the emergency department for any new or worsening symptoms. I stressed the importance of follow up for repeat assessment and possibly further evaluation/treatment. Coding Diagnoses     Clinical Impression:   1. Acute exacerbation of chronic obstructive pulmonary disease (COPD) ([facility] [address]        Disposition     Disposition:  Home. Daily Dunn M.D.   SITA Board Certified Emergency Physician    Provider Attestation:  If a scribe was utilized in generation of this patient record, I personally performed the services described in the documentation, reviewed the documentation, as recorded by the scribe in my presence, and it accurately records the patient's history of presenting illness, review of systems, patient physical examination, and procedures performed by me as the attending physician. Jaelyn Owens M.D. SITA Board Certified Emergency Physician  7/11/2018.  1:56 AM    Scribe Attestation     Raz Rodriguez acting as a scribe for and in the presence of Jason Louis MD      July 11, 2018 at 2:10 AM       Provider Attestation:      I personally performed the services described in the documentation, reviewed the documentation, as recorded by the scribe in my presence, and it accurately and completely records my words and actions.  July 11, 2018 at 2:10 AM - Jason Louis MD

## 2018-07-11 NOTE — DISCHARGE INSTRUCTIONS
SPECIFIC PATIENT INSTRUCTIONS FROM THE PHYSICIAN WHO TREATED YOU IN THE ER TODAY:  1. Return if any concerns or worsening of condition(s)  2. Levaquin and steroids as prescribed until finished. 3. Use the albuterol inhaler as prescribed for wheezing and/or cough. 4. FOLLOW UP APPOINTMENT:  Your primary doctor in 1-2 days. Chronic Obstructive Pulmonary Disease (COPD): Care Instructions  Your Care Instructions    Chronic obstructive pulmonary disease (COPD) is a general term for a group of lung diseases, including emphysema and chronic bronchitis. People with COPD have decreased airflow in and out of the lungs, which makes it hard to breathe. The airways also can get clogged with thick mucus. Cigarette smoking is a major cause of COPD. Although there is no cure for COPD, you can slow its progress. Following your treatment plan and taking care of yourself can help you feel better and live longer. Follow-up care is a key part of your treatment and safety. Be sure to make and go to all appointments, and call your doctor if you are having problems. It's also a good idea to know your test results and keep a list of the medicines you take. How can you care for yourself at home?   Staying healthy    · Do not smoke. This is the most important step you can take to prevent more damage to your lungs. If you need help quitting, talk to your doctor about stop-smoking programs and medicines. These can increase your chances of quitting for good.     · Avoid colds and flu. Get a pneumococcal vaccine shot. If you have had one before, ask your doctor whether you need a second dose. Get the flu vaccine every fall. If you must be around people with colds or the flu, wash your hands often.     · Avoid secondhand smoke, air pollution, and high altitudes. Also avoid cold, dry air and hot, humid air.  Stay at home with your windows closed when air pollution is bad.    Medicines and oxygen therapy    · Take your medicines exactly as prescribed. Call your doctor if you think you are having a problem with your medicine.     · You may be taking medicines such as:  ¨ Bronchodilators. These help open your airways and make breathing easier. Bronchodilators are either short-acting (work for 6 to 9 hours) or long-acting (work for 24 hours). You inhale most bronchodilators, so they start to act quickly. Always carry your quick-relief inhaler with you in case you need it while you are away from home. ¨ Corticosteroids (prednisone, budesonide). These reduce airway inflammation. They come in pill or inhaled form. You must take these medicines every day for them to work well.     · A spacer may help you get more inhaled medicine to your lungs. Ask your doctor or pharmacist if a spacer is right for you. If it is, ask how to use it properly.     · Do not take any vitamins, over-the-counter medicine, or herbal products without talking to your doctor first.     · If your doctor prescribed antibiotics, take them as directed. Do not stop taking them just because you feel better. You need to take the full course of antibiotics.     · Oxygen therapy boosts the amount of oxygen in your blood and helps you breathe easier. Use the flow rate your doctor has recommended, and do not change it without talking to your doctor first.   Activity    · Get regular exercise. Walking is an easy way to get exercise. Start out slowly, and walk a little more each day.     · Pay attention to your breathing. You are exercising too hard if you cannot talk while you are exercising.     · Take short rest breaks when doing household chores and other activities.     · Learn breathing methods-such as breathing through pursed lips-to help you become less short of breath.     · If your doctor has not set you up with a pulmonary rehabilitation program, talk to him or her about whether rehab is right for you.  Rehab includes exercise programs, education about your disease and how to manage it, help with diet and other changes, and emotional support. Diet    · Eat regular, healthy meals. Use bronchodilators about 1 hour before you eat to make it easier to eat. Eat several small meals instead of three large ones. Drink beverages at the end of the meal. Avoid foods that are hard to chew.     · Eat foods that contain protein so that you do not lose muscle mass.     · Talk with your doctor if you gain too much weight or if you lose weight without trying.    Mental health    · Talk to your family, friends, or a therapist about your feelings. It is normal to feel frightened, angry, hopeless, helpless, and even guilty. Talking openly about bad feelings can help you cope. If these feelings last, talk to your doctor. When should you call for help? Call 911 anytime you think you may need emergency care. For example, call if:    · You have severe trouble breathing.    Call your doctor now or seek immediate medical care if:    · You have new or worse trouble breathing.     · You cough up blood.     · You have a fever.    Watch closely for changes in your health, and be sure to contact your doctor if:    · You cough more deeply or more often, especially if you notice more mucus or a change in the color of your mucus.     · You have new or worse swelling in your legs or belly.     · You are not getting better as expected. Where can you learn more? Go to http://delmi-andres.info/. Crystal Rosa in the search box to learn more about \"Chronic Obstructive Pulmonary Disease (COPD): Care Instructions. \"  Current as of: December 6, 2017  Content Version: 11.7  © 5444-0134 Healthwise, Incorporated. Care instructions adapted under license by [employer] (which disclaims liability or warranty for this information).  If you have questions about a medical condition or this instruction, always ask your healthcare professional. Truman Borrero disclaims any warranty or liability for your use of this information. COPD Exacerbation Plan: Care Instructions  Your Care Instructions    If you have chronic obstructive pulmonary disease (COPD), your usual shortness of breath could suddenly get worse. You may start coughing more and have more mucus. This flare-up is called a COPD exacerbation (say \"ud-CLC-ps-BAY-tess\"). A lung infection or air pollution could set off an exacerbation. Sometimes it can happen after a quick change in temperature or being around chemicals. Work with your doctor to make a plan for dealing with an exacerbation. You can better manage it if you plan ahead. Follow-up care is a key part of your treatment and safety. Be sure to make and go to all appointments, and call your doctor if you are having problems. It's also a good idea to know your test results and keep a list of the medicines you take. How can you care for yourself at home? During an exacerbation  · Do not panic if you start to have one. Quick treatment at home may help you prevent serious breathing problems. If you have a COPD exacerbation plan that you developed with your doctor, follow it. · Take your medicines exactly as your doctor tells you. ¨ Use your inhaler as directed by your doctor. If your symptoms do not get better after you use your medicine, have someone take you to the emergency room. Call an ambulance if necessary. ¨ With inhaled medicines, a spacer or a nebulizer may help you get more medicine to your lungs. Ask your doctor or pharmacist how to use them properly. Practice using the spacer in front of a mirror before you have an exacerbation. This may help you get the medicine into your lungs quickly. ¨ If your doctor has given you steroid pills, take them as directed. ¨ Your doctor may have given you a prescription for antibiotics, which you can fill if you need it. ¨ Talk to your doctor if you have any problems with your medicine.  And call your doctor if you have to use your antibiotic or steroid pills. Preventing an exacerbation  · Do not smoke. This is the most important step you can take to prevent more damage to your lungs and prevent problems. If you already smoke, it is never too late to stop. If you need help quitting, talk to your doctor about stop-smoking programs and medicines. These can increase your chances of quitting for good. · Take your daily medicines as prescribed. · Avoid colds and flu. ¨ Get a pneumococcal vaccine. ¨ Get a flu vaccine each year, as soon as it is available. Ask those you live or work with to do the same, so they will not get the flu and infect you. ¨ Try to stay away from people with colds or the flu. ¨ Wash your hands often. · Avoid secondhand smoke; air pollution; cold, dry air; hot, humid air; and high altitudes. Stay at home with your windows closed when air pollution is bad. · Learn breathing techniques for COPD, such as breathing through pursed lips. These techniques can help you breathe easier during an exacerbation. When should you call for help? Call 911 anytime you think you may need emergency care. For example, call if:    · You have severe trouble breathing.     · You have severe chest pain.    Call your doctor now or seek immediate medical care if:    · You have new or worse shortness of breath.     · You develop new chest pain.     · You are coughing more deeply or more often, especially if you notice more mucus or a change in the color of your mucus.     · You cough up blood.     · You have new or increased swelling in your legs or belly.     · You have a fever.    Watch closely for changes in your health, and be sure to contact your doctor if:    · You need to use your antibiotic or steroid pills.     · Your symptoms are getting worse. Where can you learn more? Go to http://delmi-andres.info/. Enter X316 in the search box to learn more about \"COPD Exacerbation Plan: Care Instructions. \"  Current as of: December 6, 2017  Content Version: 11.7  © 9851-8058 [employer]. Care instructions adapted under license by [employer] (which disclaims liability or warranty for this information). If you have questions about a medical condition or this instruction, always ask your healthcare professional. [address] any warranty or liability for your use of this information. Breathing Techniques for COPD: Care Instructions  Your Care Instructions    Breathing is hard when you have chronic obstructive pulmonary disease (COPD). You may take quick, short breaths. Breathing this way makes it harder to get air into your lungs. Learning new ways to control your breathing may help. You may feel better and be able to do more. You can try three basic ways to control your breathing. They are pursed-lip breathing, diaphragmatic breathing, and breathing while bending. Use these methods when you are more short of breath than normal. Practice them often so you can do them well. Follow-up care is a key part of your treatment and safety. Be sure to make and go to all appointments, and call your doctor if you are having problems. It's also a good idea to know your test results and keep a list of the medicines you take. How can you care for yourself at home? · Pursed-lip breathing helps you breathe more air out so that your next breath can be deeper. For this type of breathing, you breathe in through your nose and out through your mouth while almost closing your lips. Breathe in for about 2 seconds, and breathe out for 4 to 6 seconds. Pursed-lip breathing decreases shortness of breath and improves your ability to exercise. · Diaphragmatic breathing helps your lungs expand so that they take in more air. ¨ Lie on your back, or prop yourself up on several pillows. ¨ Put one hand on your belly and the other on your chest. When you breathe in, push your belly out as far as possible.  You should feel the hand on your belly move out, while the hand on your chest does not move. ¨ When you breathe out, you should feel the hand on your belly move in. When you can do this type of breathing well while lying down, learn to do it while sitting or standing. Many people with COPD find this breathing method helpful. ¨ Practice diaphragmatic breathing for 20 minutes, 2 or 3 times a day. · Breathing while bending forward at the waist may make breathing easier. It can reduce shortness of breath while you exercise or rest. It helps the diaphragm move more easily. The diaphragm is a large muscle that separates your lungs from your belly. It helps draw air into your lungs as you breathe. · Do not smoke. Smoking makes COPD worse. If you need help quitting, talk to your doctor about stop-smoking programs and medicines. These can increase your chances of quitting for good. When should you call for help? Call your doctor now or seek immediate medical care if:    · Your breathing methods do not help.     · Your shortness of breath gets worse.     · You cough up blood.     · You have swelling in your belly and legs.     · You have severe chest pain.    Watch closely for changes in your health, and be sure to contact your doctor if you have any problems. Where can you learn more? Go to http://delmi-andres.info/. Enter P551 in the search box to learn more about \"Breathing Techniques for COPD: Care Instructions. \"  Current as of: December 6, 2017  Content Version: 11.7  © 2142-5077 Healthwise, Incorporated. Care instructions adapted under license by [employer] (which disclaims liability or warranty for this information). If you have questions about a medical condition or this instruction, always ask your healthcare professional. [place] [address] any warranty or liability for your use of this information. Learning About COPD  What is COPD?     COPD is a lung disease that makes it hard to breathe. COPD stands for chronic obstructive pulmonary disease. It is caused by damage to the lungs over many years, usually from smoking. COPD is a mix of two diseases: chronic bronchitis and emphysema. Other things that may put you at risk for COPD include breathing chemical fumes, dust, or air pollution over a long period of time. Secondhand smoke is also bad. In chronic bronchitis, the airways that carry air to the lungs (bronchial tubes) get inflamed and make a lot of mucus. This can narrow or block the airways, making it hard for you to breathe. In emphysema, the air sacs in your lungs are damaged and lose their stretch. Less air gets in and out of your lungs, which makes you feel short of breath. What can you expect when you have COPD? COPD gets worse over time. You cannot undo the damage to your lungs. Over time, you may find that:  · You get short of breath even when you do simple things like get dressed or fix a meal.  · It is hard to eat or exercise. · You lose weight and feel weaker. But there are things you can do to prevent more damage and feel better. What are the symptoms? The main symptoms are:  · A cough that will not go away. · Mucus that comes up when you cough. · Shortness of breath that gets worse with activity. At times, your symptoms may suddenly flare up and get much worse. This is a called a COPD exacerbation (say \"egg-JAYCE-er-BAY-shglenn\"). When this happens, your usual symptoms quickly get worse and stay bad. This can be dangerous. You may have to go to the hospital.  How can you keep COPD from getting worse? Don't smoke. That is the best way to keep COPD from getting worse. If you already smoke, it is never too late to stop. If you need help quitting, talk to your doctor about stop-smoking programs and medicines. These can increase your chances of quitting for good. You can do other things to keep COPD from getting worse:  · Avoid bad air.  Air pollution, chemical fumes, and dust also can make COPD worse. · Get a flu shot every year. A shot may keep the flu from turning into something more serious, like pneumonia. A flu shot also may lower your chances of having a COPD flare-up. · Get a pneumococcal vaccine shot. If you have had one before, ask your doctor if you need another dose. How is COPD treated? COPD is treated with medicines and oxygen. You also can take steps at home to stay healthy and keep your COPD from getting worse. Medicines and oxygen therapy  · You may be taking medicines such as:  ¨ Bronchodilators. These help open your airways and make breathing easier. Bronchodilators are either short-acting (work for 6 to 9 hours) or long-acting (work for 24 hours). You inhale most bronchodilators, so they start to act quickly. Always carry your quick-relief inhaler with you in case you need it while you are away from home. ¨ Corticosteroids. These reduce airway inflammation. They come in pill or inhaled form. You must take these medicines every day for them to work well. · Take your medicines exactly as prescribed. Call your doctor if you think you are having a problem with your medicine. · Oxygen therapy boosts the amount of oxygen in your blood and helps you breathe easier. Use the flow rate your doctor has recommended, and do not change it without talking to your doctor first.  Other care at home  · If your doctor recommends it, get more exercise. Walking is a good choice. Bit by bit, increase the amount you walk every day. Try for at least 30 minutes on most days of the week. · Learn breathing methods-such as breathing through pursed lips-to help you become less short of breath. · If your doctor has not set you up with a pulmonary rehabilitation program, talk to him or her about whether rehab is right for you.  Rehab includes exercise programs, education about your disease and how to manage it, help with diet and other changes, and emotional support. · Eat regular, healthy meals. Use bronchodilators about 1 hour before you eat to make it easier to eat. Eat several small meals instead of three large ones. Drink beverages at the end of the meal. Avoid foods that are hard to chew. Follow-up care is a key part of your treatment and safety. Be sure to make and go to all appointments, and call your doctor if you are having problems. It's also a good idea to know your test results and keep a list of the medicines you take. Where can you learn more? Go to http://delmi-andres.info/. Enter V314 in the search box to learn more about \"Learning About COPD. \"  Current as of: 2017  Content Version: 11.7  © 9391-8721 [employer]. Care instructions adapted under license by [employer] (which disclaims liability or warranty for this information). If you have questions about a medical condition or this instruction, always ask your healthcare professional. [place] [address] any warranty or liability for your use of this information. [facility] Activation    Thank you for requesting access to [facility]. Please follow the instructions below to securely access and download your online medical record. [facility] allows you to send messages to your doctor, view your test results, renew your prescriptions, schedule appointments, and more. How Do I Sign Up? 1. In your internet browser, go to [email or web address]. 2. Click on the First Time User? Click Here link in the Sign In box. You will see the New Member Sign Up page. 3. Enter your [facility] Access Code exactly as it appears below. You will not need to use this code after youve completed the sign-up process. If you do not sign up before the expiration date, you must request a new code. [facility] Access Code: [email or web address]  Expires: 10/9/2018  1:39 AM (This is the date your [facility] access code will )    4.  Enter the last four digits of your Social Security Number (xxxx) and Date of Birth (mm/dd/yyyy) as indicated and click Submit. You will be taken to the next sign-up page. 5. Create a [facility] ID. This will be your [facility] login ID and cannot be changed, so think of one that is secure and easy to remember. 6. Create a [facility] password. You can change your password at any time. 7. Enter your Password Reset Question and Answer. This can be used at a later time if you forget your password. 8. Enter your e-mail address. You will receive e-mail notification when new information is available in [address] 9. Click Sign Up. You can now view and download portions of your medical record. 10. Click the Download Summary menu link to download a portable copy of your medical information. Additional Information    If you have questions, please visit the Frequently Asked Questions section of the [facility] website at [email or web address] Remember, [facility] is NOT to be used for urgent needs. For medical emergencies, dial 911.

## 2018-08-08 NOTE — PROGRESS NOTES
PT DAILY TREATMENT NOTE - [facility]     Patient Name: Sulema Humphries  Date:2017  : 1929  [x]  Patient  Verified  Payor: Angelica Musa / Plan: [place] MED[place] PART [place] & B / Product Type: Medicare /    In time:1000  Out time:1024  Total Treatment Time (min): 24  Total Timed Codes (min): 24  1:1 Treatment Time ( [address] only): 24   Visit #: 2 of 10    Treatment Area: Cervical pain [M54.2]    SUBJECTIVE  Pain Level (0-10 scale): 0  Any medication changes, allergies to medications, adverse drug reactions, diagnosis change, or new procedure performed?: [x] No    [] Yes (see summary sheet for update)  Subjective functional status/changes:   [] No changes reported  I'm fine.      OBJECTIVE    Modality rationale:    Min Type Additional Details    [] Estim:  []Unatt       []IFC  []Premod                        []Other:  []w/ice   []w/heat  Position:  Location:    [] Estim: []Att    []TENS instruct  []NMES                    []Other:  []w/US   []w/ice   []w/heat  Position:  Location:    []  Traction: [] Cervical       []Lumbar                       [] Prone          []Supine                       []Intermittent   []Continuous Lbs:  [] before manual  [] after manual    []  Ultrasound: []Continuous   [] Pulsed                           []1MHz   []3MHz W/cm2:  Location:    []  Iontophoresis with dexamethasone         Location: [] Take home patch   [] In clinic    []  Ice     []  heat  []  Ice massage  []  Laser   []  Anodyne Position:  Location:    []  Laser with stim  []  Other:  Position:  Location:    []  Vasopneumatic Device Pressure:       [] lo [] med [] hi   Temperature: [] lo [] med [] hi   [] Skin assessment post-treatment:  []intact []redness- no adverse reaction    []redness - adverse reaction:     14 min Therapeutic Exercise:  [x] See flow sheet :   Rationale: increase ROM and increase strength to improve the patients ability to improve ease of daily tasks    10 min Manual Therapy:  Gentle SOR, gentle cervical manual Subjective:      Patient ID: Rosa Emilia Reyes is a 23 y.o. female.    Chief Complaint:  Hypothyroidism and Polycystic Ovary Syndrome      History of Present Illness  Completes Masters at [employer]     With regards to her  hypothyroidism    presented in 2014      Etiology determine to be  Hashimoto's per her report   Thyroid ultrasound:  Done and ok - no fna needed  - I dont have those records            Current medication:  generic lt4 75 mcg qd  6/7 days a week      Takes thyroid medication properly without food first thing in the morning     current symptoms:  None         With regards to her PCOS:   She was diagnosed with PCOS at the age of  [age]     Presented with acne,  irreg menses       LMP? Spotting   IUD in place      She is currently on:   Metformin 750 bid         Current concerns are:  + Acne stable - seeing derm - accutane is planned   No  Hirsutism   No scalp hair loss   No Voice deepening  No Clitoromegaly   No Voice deepening     Denies shoe or ring size changes   Denies change in stretch marks    Denies polyuria, polydipsia, nocturia, unexplained weight loss or blurred vision     Desired future fertility?  no        Review of Systems   Constitutional: Negative for unexpected weight change.   Eyes: Negative for visual disturbance.   Respiratory: Negative for shortness of breath.    Cardiovascular: Negative for chest pain.   Gastrointestinal: Negative for abdominal pain.   Musculoskeletal: Negative for arthralgias.   Skin: Negative for wound.   Neurological: Negative for headaches.   Hematological: Does not bruise/bleed easily.   Psychiatric/Behavioral: Negative for sleep disturbance.       Objective:   Physical Exam   Constitutional: She appears well-developed.   HENT:   Right Ear: External ear normal.   Left Ear: External ear normal.   Nose: Nose normal.   Hearing normal  Dentition good    Neck: No tracheal deviation present. No thyromegaly present.   Cardiovascular: Normal rate.    No murmur  heard.  Pulmonary/Chest: Effort normal and breath sounds normal.   Abdominal: Soft. There is no tenderness. No hernia.   Musculoskeletal: She exhibits no edema.   Gait normal  No clubbing or cyanosis noted   Neurological: She displays normal reflexes. No cranial nerve deficit.   Skin:   No nodules       Psychiatric: She has a normal mood and affect. Judgment normal.   Vitals reviewed.  +acne     Body mass index is 27.58 kg/m².    Lab Reviewed:   6/16/17  TSH 0.41       8/2/16  cml nl  Vit d 27   tg ab < 1  17 oh prog 43 nl  tpo neg  prolacitn nl   fsh 6  Estradiol 27  lh 7  Insulin nl   Total and free t nl      Assessment and Plan     PCOS (polycystic ovarian syndrome)  Reviewed that this is a diagnosis of exclusion BUT long duration and lack of serious progression is reassuring.    Alerted that PCOS is a risk for T2DM.    Will screen for metabolic syndrome and check A1C.   Continue metformin            Hypothyroidism due to Hashimoto's thyroiditis    Labs today  Goal is a normal TSH       Reviewed usual  times of thyroid hormone  changes- call if start/ stop ocps, weight changes, attempting conception and during pregnancy          rtc 1 year    stretching/distraction, STM B UT/scalenes   Rationale: decrease pain, increase ROM, increase tissue extensibility and decrease trigger points to improve postural awareness      With   [] TE   [] TA   [] neuro   [] other: Patient Education: [x] Review HEP    [] Progressed/Changed HEP based on:   [] positioning   [] body mechanics   [] transfers   [] heat/ice application    [] other:      Other Objective/Functional Measures: completed exercises per flow sheet     Pain Level (0-10 scale) post treatment: 0    ASSESSMENT/Changes in Function: Added theraband rows and extensions as well as prone cervical retractions. Requires constant verbal cues to keep head up. Patient will continue to benefit from skilled PT services to modify and progress therapeutic interventions, address functional mobility deficits, address ROM deficits, address strength deficits, analyze and address soft tissue restrictions, analyze and cue movement patterns, analyze and modify body mechanics/ergonomics, assess and modify postural abnormalities and instruct in home and community integration to attain remaining goals. []  See Plan of Care  []  See progress note/recertification  []  See Discharge Summary         Progress towards goals / Updated goals:  Goal: Pt to increase C/S AROM all planes by at least 5 deg to achieve neutral head/neck posture. Status at last note/certification: Flex 40 deg; Ext -5 deg; SBR/SBL 15 deg; RR/RL 10 deg - all with head flexed at 40 deg  Current status: progressing - Flex 40 deg; Ext to neutral with effort; SBR 15 deg; SBL 22 deg; RR 20 deg; RL 30 deg  Goal: Pt to be able to sleep through the night on one pillow without increased pain to assist with neutral posture.   Status at last note/certification: sleeps with two thick pillows, head fully flexed  Current status: progressing - sleeps with one pillow folded in half  Goal: Pt to report < 2/10 pain at worst to be able to look up or turn head with ease when performing daily tasks. Status at last note/certification: 6/80 pain at worst  Current status: not met - 4/10 pain at worst  Goal: Pt to report FOTO score of 60 pts to show improved posture, function.   Status at last note/certification: FOTO 48 pts at eval  Current status: progressing- 58     PLAN  [x]  Upgrade activities as tolerated     [x]  Continue plan of care  []  Update interventions per flow sheet       []  Discharge due to:_  []  Other:_      Dustin Ivory PT 7/26/2017  7:02 AM    Future Appointments  Date Time Provider Saeed Tracy   7/26/2017 10:00 AM Dustin Ivory, PT [facility] [place] [address]   7/27/2017 12:30 PM [address] [facility] [address]   7/31/2017 3:30 PM Dustin Ivory PT [facility] [place] [address]   8/1/2017 12:30 PM [address] PULM ROOM 1 [facility] [address]   8/2/2017 10:30 AM Kisha Rao PT [email or web address] [address]   8/3/2017 12:30 PM [address] PULM ROOM 1 [facility] [address]   8/7/2017 10:30 AM Dustin Ivory PT [email or web address] [address]   8/8/2017 12:30 PM [address] PULM ROOM 1 [facility] [address]   8/9/2017 10:30 AM Kisha Rao PT [facility] [place] [address]   8/10/2017 12:30 PM [address] PULM ROOM 1 [facility] [address]   8/14/2017 10:30 AM Dustin Ivory PT [email or web address] [address]   8/15/2017 12:30 PM [address] PULM ROOM 1 [facility] [address]   8/16/2017 10:30 AM Kisha Rao PT [email or web address] [address]   8/17/2017 12:30 PM [address] PULM ROOM 1 [facility] [address]

## 2019-01-01 ENCOUNTER — HOSPITAL ENCOUNTER (EMERGENCY)
Age: 84
Discharge: HOME | End: 2019-08-16
Attending: EMERGENCY MEDICINE
Payer: MEDICARE

## 2019-01-01 VITALS
HEART RATE: 64 BPM | WEIGHT: 125 LBS | TEMPERATURE: 98.8 F | RESPIRATION RATE: 24 BRPM | SYSTOLIC BLOOD PRESSURE: 116 MMHG | BODY MASS INDEX: 19.01 KG/M2 | OXYGEN SATURATION: 93 % | DIASTOLIC BLOOD PRESSURE: 59 MMHG

## 2019-01-01 DIAGNOSIS — L03.115 CELLULITIS OF RIGHT LEG: ICD-10-CM

## 2019-01-01 DIAGNOSIS — T14.8XXA SKIN AVULSION: Primary | ICD-10-CM

## 2019-01-01 PROCEDURE — 74011250636 HC RX REV CODE- 250/636: Performed by: PHYSICIAN ASSISTANT

## 2019-01-01 PROCEDURE — 90715 TDAP VACCINE 7 YRS/> IM: CPT | Performed by: PHYSICIAN ASSISTANT

## 2019-01-01 PROCEDURE — 90471 IMMUNIZATION ADMIN: CPT

## 2019-01-01 PROCEDURE — 99283 EMERGENCY DEPT VISIT LOW MDM: CPT

## 2019-01-01 RX ORDER — CEPHALEXIN 500 MG/1
500 CAPSULE ORAL 3 TIMES DAILY
Qty: 30 CAP | Refills: 0 | Status: SHIPPED | OUTPATIENT
Start: 2019-01-01 | End: 2019-01-01

## 2019-01-01 RX ADMIN — TETANUS TOXOID, REDUCED DIPHTHERIA TOXOID AND ACELLULAR PERTUSSIS VACCINE, ADSORBED 0.5 ML: 5; 2.5; 8; 8; 2.5 SUSPENSION INTRAMUSCULAR at 19:56

## 2019-05-10 ENCOUNTER — APPOINTMENT (OUTPATIENT)
Dept: GENERAL RADIOLOGY | Age: 84
End: 2019-05-10
Attending: NURSE PRACTITIONER
Payer: MEDICARE

## 2019-05-10 ENCOUNTER — HOSPITAL ENCOUNTER (EMERGENCY)
Age: 84
Discharge: HOME | End: 2019-05-10
Attending: EMERGENCY MEDICINE
Payer: MEDICARE

## 2019-05-10 VITALS
DIASTOLIC BLOOD PRESSURE: 68 MMHG | WEIGHT: 123 LBS | OXYGEN SATURATION: 95 % | RESPIRATION RATE: 19 BRPM | HEART RATE: 54 BPM | SYSTOLIC BLOOD PRESSURE: 122 MMHG | BODY MASS INDEX: 18.64 KG/M2 | TEMPERATURE: 98.1 F | HEIGHT: 68 IN

## 2019-05-10 DIAGNOSIS — R07.9 CHEST PAIN, UNSPECIFIED TYPE: ICD-10-CM

## 2019-05-10 DIAGNOSIS — L03.116 CELLULITIS OF LEFT LOWER EXTREMITY: Primary | ICD-10-CM

## 2019-05-10 LAB
ANION GAP SERPL CALC-SCNC: 7 MMOL/L (ref 3–18)
ATRIAL RATE: 64 BPM
BASOPHILS # BLD: 0 K/UL (ref 0–0.1)
BASOPHILS NFR BLD: 1 % (ref 0–2)
BUN SERPL-MCNC: 37 MG/DL (ref 7–18)
BUN/CREAT SERPL: 28 (ref 12–20)
CALCIUM SERPL-MCNC: 9.2 MG/DL (ref 8.5–10.1)
CALCULATED R AXIS, ECG10: 63 DEGREES
CALCULATED T AXIS, ECG11: 65 DEGREES
CHLORIDE SERPL-SCNC: 108 MMOL/L (ref 100–108)
CK MB CFR SERPL CALC: 1.8 % (ref 0–4)
CK MB SERPL-MCNC: 6.2 NG/ML (ref 5–25)
CK SERPL-CCNC: 340 U/L (ref 39–308)
CO2 SERPL-SCNC: 30 MMOL/L (ref 21–32)
CREAT SERPL-MCNC: 1.3 MG/DL (ref 0.6–1.3)
DIAGNOSIS, 93000: NORMAL
DIFFERENTIAL METHOD BLD: ABNORMAL
EOSINOPHIL # BLD: 0.1 K/UL (ref 0–0.4)
EOSINOPHIL NFR BLD: 2 % (ref 0–5)
ERYTHROCYTE [DISTWIDTH] IN BLOOD BY AUTOMATED COUNT: 14.1 % (ref 11.6–14.5)
GLUCOSE SERPL-MCNC: 88 MG/DL (ref 74–99)
HCT VFR BLD AUTO: 38 % (ref 36–48)
HGB BLD-MCNC: 12.3 G/DL (ref 13–16)
LYMPHOCYTES # BLD: 1.1 K/UL (ref 0.9–3.6)
LYMPHOCYTES NFR BLD: 15 % (ref 21–52)
MCH RBC QN AUTO: 31.8 PG (ref 24–34)
MCHC RBC AUTO-ENTMCNC: 32.4 G/DL (ref 31–37)
MCV RBC AUTO: 98.2 FL (ref 74–97)
MONOCYTES # BLD: 1.1 K/UL (ref 0.05–1.2)
MONOCYTES NFR BLD: 15 % (ref 3–10)
NEUTS SEG # BLD: 4.9 K/UL (ref 1.8–8)
NEUTS SEG NFR BLD: 67 % (ref 40–73)
P-R INTERVAL, ECG05: 280 MS
PLATELET # BLD AUTO: 195 K/UL (ref 135–420)
PMV BLD AUTO: 10.5 FL (ref 9.2–11.8)
POTASSIUM SERPL-SCNC: 4.2 MMOL/L (ref 3.5–5.5)
Q-T INTERVAL, ECG07: 428 MS
QRS DURATION, ECG06: 80 MS
QTC CALCULATION (BEZET), ECG08: 441 MS
RBC # BLD AUTO: 3.87 M/UL (ref 4.7–5.5)
SODIUM SERPL-SCNC: 145 MMOL/L (ref 136–145)
TROPONIN I SERPL-MCNC: <0.02 NG/ML (ref 0–0.04)
TROPONIN I SERPL-MCNC: <0.02 NG/ML (ref 0–0.04)
VENTRICULAR RATE, ECG03: 64 BPM
WBC # BLD AUTO: 7.2 K/UL (ref 4.6–13.2)

## 2019-05-10 PROCEDURE — 71045 X-RAY EXAM CHEST 1 VIEW: CPT

## 2019-05-10 PROCEDURE — 93005 ELECTROCARDIOGRAM TRACING: CPT

## 2019-05-10 PROCEDURE — 85025 COMPLETE CBC W/AUTO DIFF WBC: CPT

## 2019-05-10 PROCEDURE — 74011250637 HC RX REV CODE- 250/637: Performed by: PHYSICIAN ASSISTANT

## 2019-05-10 PROCEDURE — 99285 EMERGENCY DEPT VISIT HI MDM: CPT

## 2019-05-10 PROCEDURE — 96360 HYDRATION IV INFUSION INIT: CPT

## 2019-05-10 PROCEDURE — 82550 ASSAY OF CK (CPK): CPT

## 2019-05-10 PROCEDURE — 80048 BASIC METABOLIC PNL TOTAL CA: CPT

## 2019-05-10 PROCEDURE — 96361 HYDRATE IV INFUSION ADD-ON: CPT

## 2019-05-10 PROCEDURE — 74011250636 HC RX REV CODE- 250/636: Performed by: PHYSICIAN ASSISTANT

## 2019-05-10 RX ORDER — CLINDAMYCIN HYDROCHLORIDE 300 MG/1
300 CAPSULE ORAL 4 TIMES DAILY
Qty: 40 CAP | Refills: 0 | Status: SHIPPED | OUTPATIENT
Start: 2019-05-10 | End: 2019-05-20

## 2019-05-10 RX ORDER — CLINDAMYCIN HYDROCHLORIDE 150 MG/1
300 CAPSULE ORAL
Status: COMPLETED | OUTPATIENT
Start: 2019-05-10 | End: 2019-05-10

## 2019-05-10 RX ADMIN — SODIUM CHLORIDE 500 ML: 900 INJECTION, SOLUTION INTRAVENOUS at 20:07

## 2019-05-10 RX ADMIN — CLINDAMYCIN HYDROCHLORIDE 300 MG: 150 CAPSULE ORAL at 19:07

## 2019-05-10 NOTE — ED TRIAGE NOTES
Pt presents with intermittent right sided chest pain x last hour. Per wife at bedside pt was doing yard work yesterday and today.  Pt has hx of copd but denies any worsening of shortness of breath at present time, denies dizziness, halima n/v/d.

## 2019-05-10 NOTE — ED PROVIDER NOTES
[facility] EMERGENCY DEPT 
 
 
6:15 PM 
 
Date: 5/10/2019 Patient Name: Merna Connor History of Presenting Illness Chief Complaint Patient presents with  Chest Pain  
 
 
80 y.o. male with noted past medical history including COPD and Atrial fibrillation who presents to the emergency department driven by his wife complaining of resolved chest pain that began 2 hours ago and lasted approximately 30 minutes. Per wife, they were driving to the ED for a wound check on his left lower leg when he began to have right sided chest pain. At the present moment, patient is not experiencing any pain. He denies any recent history of shortness of breathe, numbness, tingling, syncope, N/V. Patient also complains of a red and warm area surrounding his wound on the left tibia. He denies fever or chills, numbness, weakness, SOB, hemoptysis, or other symptoms at this time. Patient denies any other associated signs or symptoms. Patient denies any other complaints. Nursing notes regarding the HPI and triage nursing notes were reviewed. Prior medical records were reviewed. Current Outpatient Medications Medication Sig Dispense Refill  tiotropium bromide (SPIRIVA RESPIMAT IN) Take  by inhalation.  fluticasone propion-salmeterol (ADVAIR HFA) 115-21 mcg/actuation inhaler Take 2 Puffs by inhalation two (2) times a day.  furosemide (LASIX PO) Take  by mouth two (2) times a week.  clindamycin (CLEOCIN) 300 mg capsule Take 1 Cap by mouth four (4) times daily for 10 days. 40 Cap 0  
 alendronate-vitamin d3 70-2,800 mg-unit per tablet Take 1 Tab by mouth every seven (7) days.  simvastatin (ZOCOR) 20 mg tablet Take  by mouth nightly.  multivitamin (ONE A DAY) tablet Take 1 Tab by mouth daily.  aspirin 81 mg tablet Take 81 mg by mouth. Past History Past Medical History: 
Past Medical History:  
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 
Past Surgical History:  
Procedure Laterality Date  HX CORONARY ARTERY BYPASS GRAFT  3/12  HX HERNIA REPAIR    
 HX HIP REPLACEMENT   & '06  
 x 2  
 HX OTHER SURGICAL  10/9/12 [facility] Family History: 
Family History Problem Relation Age of Onset  Cancer Mother  Emphysema Father Social History: 
Social History Tobacco Use  Smoking status: Former Smoker Last attempt to quit: 1990 Years since quittin.3  Smokeless tobacco: Never Used Substance Use Topics  Alcohol use: Yes Comment: social  
 Drug use: No  
 
 
Allergies: 
No Known Allergies Patient's primary care provider (as noted in EPIC):  Aimee Son MD 
 
Review of Systems Constitutional: Negative for chills and fever. HENT: Negative for congestion. Respiratory: Negative for cough, chest tightness and shortness of breath. Cardiovascular: Positive for chest pain. Negative for leg swelling. Gastrointestinal: Negative for abdominal pain, nausea and vomiting. Genitourinary: Negative for dysuria and flank pain. Musculoskeletal: Negative for joint swelling and neck pain. Skin: Positive for color change (right tibia) and wound (right tibia). Neurological: Negative for dizziness, syncope, light-headedness and headaches. Visit Vitals /68 Pulse (!) 54 Temp 98.1 °F (36.7 °C) Resp 19 Ht 5' 8\" (1.727 m) Wt 55.8 kg (123 lb) SpO2 95% BMI 18.70 kg/m² PHYSICAL EXAM: 
 
CONSTITUTIONAL:  Alert, in no apparent distress; thin. HEAD:  Normocephalic, atraumatic. EYES:  EOMI. Non-icteric sclera. Normal conjunctiva. ENTM:  Mouth: mucous membranes moist. 
NECK:  Supple RESPIRATORY:  Chest clear, equal breath sounds, good air movement. Without wheezes, rhonchi or rales. CARDIOVASCULAR:  Regular rate and rhythm. No murmurs, rubs, or gallops. Chest:  No rash, lesions, bruising. No reproducible tenderness to palpation. GI:  Normal bowel sounds, abdomen soft and non-tender. No rebound or guarding. BACK:  Non-tender. UPPER EXT:  Normal inspection. LOWER EXT:  No calf edema, no calf tenderness. Distal pulses intact. NEURO:  Moves all four extremities, and grossly normal motor exam. 
SKIN:  No rashes;  Normal for age. Open wound with surrounding calor and erythematous area to the left anterior tibial region approximately 12cm x 6cm with central open wound with dark crust; no purulent drainage; not malodorous; not circumferential.  
PSYCH:  Alert and normal affect. DIFFERENTIAL DIAGNOSES/ MEDICAL DECISION MAKING: 
Chest pain etiologies include acute cardiac events to include possible acute myocardial infarction, acute coronary syndrome, pneumonia, chest wall pain (myofascial/ musculoskeletal etiology), chronic obstructive pulmonary disease (copd), acute asthma exacerbation, congestive heart failure, acute bronchitis, pulmonary embolism, upper respiratory infection, referred abdominal pain, other etiologies, versus combination of the above. ED COURSE: 
 
Recent Results (from the past 12 hour(s)) EKG, 12 LEAD, INITIAL Collection Time: 05/10/19  5:03 PM  
Result Value Ref Range Ventricular Rate 64 BPM  
 Atrial Rate 64 BPM  
 P-R Interval 280 ms QRS Duration 80 ms  
 Q-T Interval 428 ms QTC Calculation (Bezet) 441 ms Calculated R Axis 63 degrees Calculated T Axis 65 degrees Diagnosis Sinus rhythm with 1st degree AV block Otherwise normal ECG When compared with ECG of 11-JUL-2018 01:29, 
Sinus rhythm is no longer with 2nd degree AV block (Mobitz I) CBC WITH AUTOMATED DIFF Collection Time: 05/10/19  5:11 PM  
Result Value Ref Range WBC 7.2 4.6 - 13.2 K/uL  
 RBC 3.87 (L) 4.70 - 5.50 M/uL  
 HGB 12.3 (L) 13.0 - 16.0 g/dL HCT 38.0 36.0 - 48.0 % MCV 98.2 (H) 74.0 - 97.0 FL  
 MCH 31.8 24.0 - 34.0 PG  
 MCHC 32.4 31.0 - 37.0 g/dL  
 RDW 14.1 11.6 - 14.5 % PLATELET 378 436 - 548 K/uL MPV 10.5 9.2 - 11.8 FL  
 NEUTROPHILS 67 40 - 73 % LYMPHOCYTES 15 (L) 21 - 52 % MONOCYTES 15 (H) 3 - 10 % EOSINOPHILS 2 0 - 5 % BASOPHILS 1 0 - 2 %  
 ABS. NEUTROPHILS 4.9 1.8 - 8.0 K/UL  
 ABS. LYMPHOCYTES 1.1 0.9 - 3.6 K/UL  
 ABS. MONOCYTES 1.1 0.05 - 1.2 K/UL  
 ABS. EOSINOPHILS 0.1 0.0 - 0.4 K/UL  
 ABS. BASOPHILS 0.0 0.0 - 0.1 K/UL  
 DF AUTOMATED METABOLIC PANEL, BASIC Collection Time: 05/10/19  5:11 PM  
Result Value Ref Range Sodium 145 136 - 145 mmol/L Potassium 4.2 3.5 - 5.5 mmol/L Chloride 108 100 - 108 mmol/L  
 CO2 30 21 - 32 mmol/L Anion gap 7 3.0 - 18 mmol/L Glucose 88 74 - 99 mg/dL BUN 37 (H) 7.0 - 18 MG/DL Creatinine 1.30 0.6 - 1.3 MG/DL  
 BUN/Creatinine ratio 28 (H) 12 - 20 GFR est AA >60 >60 ml/min/1.73m2 GFR est non-AA 52 (L) >60 ml/min/1.73m2 Calcium 9.2 8.5 - 10.1 MG/DL  
CARDIAC PANEL,(CK, CKMB & TROPONIN) Collection Time: 05/10/19  5:11 PM  
Result Value Ref Range  (H) 39 - 308 U/L  
 CK - MB 6.2 (H) <3.6 ng/ml CK-MB Index 1.8 0.0 - 4.0 %  Troponin-I, QT <0.02 0.0 - 0.045 NG/ML  
  
CXR: NAD  
 
 IMPRESSION AND MEDICAL DECISION MAKING: 
Based upon the patients presentation with noted HPI and PE, along with the work up done in the emergency department, I believe that the patient is having non-cardiac chest pain as noted. Given the time frame of the patients chest pain, two sets of cardiac enzymes were done to rule out an acute cardiac event. Dr. Lc Mendoza will be taking over care of the patient at this time, to trend his troponin. We will treat the cellulitis with clindamycin, no signs of sepsis, as vitals and white blood cell count are within normal limits. Plan for follow-up with primary care doctor for this. 
 
8:07 PM : Pt care transferred to Dr Lc Mendoza, ED provider. History of patient complaint(s), available diagnostic reports and current treatment plan has been discussed thoroughly. Intended disposition of patient : TBD Pending diagnostics reports and/or labs (please list): Repeat troponin PALOMO Pelaez  
 
8:07 PM :[facility] [place] [facility] Pt complaint(s), current treatment plan, progression and available diagnostic results have been discussed thoroughly. 
8:040  Re-examined: patient alert and oriented x 3 with no complaints. Repeat troponin - normal.   
 
Patient D/C home

## 2019-08-16 NOTE — DISCHARGE INSTRUCTIONS

## 2019-08-16 NOTE — ED PROVIDER NOTES
EMERGENCY DEPARTMENT HISTORY AND PHYSICAL EXAM    Date: 8/16/2019  Patient Name: Navi Matos    History of Presenting Illness     Chief Complaint   Patient presents with    Leg Pain         History Provided By: Patient    Chief Complaint: leg wound  Duration: 2-4 Weeks  Timing:  Constant and Worsening  Location: right upper leg  Quality: Aching  Severity: Mild  Modifying Factors: applying abx powder  Associated Symptoms: denies any other associated signs or symptoms      Additional History (Context): Navi Matos is a [address] y.o. male with a fib, skin cancer who presents with wound to right upper leg x 2-4 weeks. Pt states he thinks he got it from rubbing a thin area of skin. Wife states he got it from dropping something in the yard on his leg. No fever, chills, N/V. NO hx of DM. No other symptoms or complaints. PCP: Nate Chao MD    Current Outpatient Medications   Medication Sig Dispense Refill    cephALEXin (KEFLEX) 500 mg capsule Take 1 Cap by mouth three (3) times daily for 10 days. 30 Cap 0    tiotropium bromide (SPIRIVA RESPIMAT IN) Take  by inhalation.  fluticasone propion-salmeterol (ADVAIR HFA) 115-21 mcg/actuation inhaler Take 2 Puffs by inhalation two (2) times a day.  furosemide (LASIX PO) Take  by mouth two (2) times a week.  alendronate-vitamin d3 70-2,800 mg-unit per tablet Take 1 Tab by mouth every seven (7) days.  simvastatin (ZOCOR) 20 mg tablet Take  by mouth nightly.  aspirin 81 mg tablet Take 81 mg by mouth.  multivitamin (ONE A DAY) tablet Take 1 Tab by mouth daily.            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    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 08    Prostate nodule     Thyroid disease     Unspecified joint replacement status     Urinary frequency     Vision decreased        Past Surgical History:  Past Surgical History:   Procedure Laterality Date    HX CORONARY ARTERY BYPASS GRAFT  3/12    HX HERNIA REPAIR      HX HIP REPLACEMENT   & '06    x 2    HX OTHER SURGICAL  10/9/12    [facility]       Family History:  Family History   Problem Relation Age of Onset    Cancer Mother     Emphysema Father        Social History:  Social History     Tobacco Use    Smoking status: Former Smoker     Last attempt to quit: 1990     Years since quittin.6    Smokeless tobacco: Never Used   Substance Use Topics    Alcohol use: Yes     Comment: social    Drug use: No       Allergies:  No Known Allergies      Review of Systems   Review of Systems   Constitutional: Negative for chills and fever. Skin: Positive for wound. All other systems reviewed and are negative. All Other Systems Negative  Physical Exam     Vitals:    19 1930   BP: 116/59   Pulse: 64   Resp: 24   Temp: 98.8 °F (37.1 °C)   SpO2: 93%   Weight: 56.7 kg (125 lb)     Physical Exam   Constitutional: He is oriented to person, place, and time. He appears well-developed and well-nourished. No distress. HENT:   Head: Normocephalic and atraumatic. Mouth/Throat: Oropharynx is clear and moist.   Eyes: Pupils are equal, round, and reactive to light. Conjunctivae and EOM are normal.   Neck: Normal range of motion. Neck supple. Musculoskeletal: Normal range of motion.         Legs:  No calf swelling, redness or tenderness   Neurological: He is alert and oriented to person, place, and time. Skin: Skin is warm and dry. He is not diaphoretic. Psychiatric: He has a normal mood and affect. Nursing note and vitals reviewed. Diagnostic Study Results     Labs -   No results found for this or any previous visit (from the past 12 hour(s)). Radiologic Studies -   No orders to display     CT Results  (Last 48 hours)    None        CXR Results  (Last 48 hours)    None            Medical Decision Making   I am the first provider for this patient. I reviewed the vital signs, available nursing notes, past medical history, past surgical history, family history and social history. Vital Signs-Reviewed the patient's vital signs. Pulse Oximetry Analysis - 93% on RA      Records Reviewed: Nursing Notes    Procedures:  Procedures    Provider Notes (Medical Decision Making): pt with infected skin tear to right upper leg x 2-4 weeks. Cleaned and dressed. Will cover with keflex. pcp f/u next week. PALOMO Trujillo 8:31 PM        MED RECONCILIATION:  No current facility-administered medications for this encounter. Current Outpatient Medications   Medication Sig    cephALEXin (KEFLEX) 500 mg capsule Take 1 Cap by mouth three (3) times daily for 10 days.  tiotropium bromide (SPIRIVA RESPIMAT IN) Take  by inhalation.  fluticasone propion-salmeterol (ADVAIR HFA) 115-21 mcg/actuation inhaler Take 2 Puffs by inhalation two (2) times a day.  furosemide (LASIX PO) Take  by mouth two (2) times a week.  alendronate-vitamin d3 70-2,800 mg-unit per tablet Take 1 Tab by mouth every seven (7) days.  simvastatin (ZOCOR) 20 mg tablet Take  by mouth nightly.  aspirin 81 mg tablet Take 81 mg by mouth.  multivitamin (ONE A DAY) tablet Take 1 Tab by mouth daily. Disposition:  home    DISCHARGE NOTE:     Pt has been reexamined. Patient has no new complaints, changes, or physical findings. Care plan outlined and precautions discussed.   All medications were reviewed with the patient; will d/c home with keflex. All of pt's questions and concerns were addressed. Patient was instructed and agrees to follow up with pcp, as well as to return to the ED upon further deterioration. Patient is ready to go home. Follow-up Information     Follow up With Specialties Details Why Contact Info    Yannick Castro MD Internal Medicine   [address] [facility] [address]            Current Discharge Medication List      START taking these medications    Details   cephALEXin (KEFLEX) 500 mg capsule Take 1 Cap by mouth three (3) times daily for 10 days. Qty: 30 Cap, Refills: 0         CONTINUE these medications which have NOT CHANGED    Details   tiotropium bromide (SPIRIVA RESPIMAT IN) Take  by inhalation. fluticasone propion-salmeterol (ADVAIR HFA) 115-21 mcg/actuation inhaler Take 2 Puffs by inhalation two (2) times a day. furosemide (LASIX PO) Take  by mouth two (2) times a week. alendronate-vitamin d3 70-2,800 mg-unit per tablet Take 1 Tab by mouth every seven (7) days. simvastatin (ZOCOR) 20 mg tablet Take  by mouth nightly. aspirin 81 mg tablet Take 81 mg by mouth.        multivitamin (ONE A DAY) tablet Take 1 Tab by mouth daily. Diagnosis     Clinical Impression:   1. Skin avulsion    2.  Cellulitis of right leg

## 2019-08-17 NOTE — ED NOTES
Written and verbal discharge instructions given. Patient verbalizes understanding of same. Patient denies  further questions about treatment and discharge instructions. Left ED with patent airway and steady gait. Arm band removed shredded.  Patient left ED 1 with RX

## 2020-01-01 ENCOUNTER — HOME CARE VISIT (OUTPATIENT)
Dept: HOSPICE | Facility: HOSPICE | Age: 85
End: 2020-01-01
Payer: MEDICARE

## 2020-01-01 ENCOUNTER — APPOINTMENT (OUTPATIENT)
Dept: CT IMAGING | Age: 85
DRG: 542 | End: 2020-01-01
Attending: EMERGENCY MEDICINE
Payer: MEDICARE

## 2020-01-01 ENCOUNTER — HOSPITAL ENCOUNTER (INPATIENT)
Age: 85
LOS: 7 days | Discharge: HOSPICE | DRG: 542 | End: 2020-06-19
Attending: EMERGENCY MEDICINE | Admitting: INTERNAL MEDICINE
Payer: MEDICARE

## 2020-01-01 ENCOUNTER — HOSPITAL ENCOUNTER (EMERGENCY)
Age: 85
Discharge: HOME | End: 2020-05-29
Attending: EMERGENCY MEDICINE
Payer: MEDICARE

## 2020-01-01 ENCOUNTER — APPOINTMENT (OUTPATIENT)
Dept: GENERAL RADIOLOGY | Age: 85
End: 2020-01-01
Attending: EMERGENCY MEDICINE
Payer: MEDICARE

## 2020-01-01 ENCOUNTER — HOSPITAL ENCOUNTER (EMERGENCY)
Age: 85
Discharge: HOME | End: 2020-03-06
Attending: EMERGENCY MEDICINE
Payer: MEDICARE

## 2020-01-01 ENCOUNTER — HOSPICE ADMISSION (OUTPATIENT)
Dept: HOSPICE | Facility: HOSPICE | Age: 85
End: 2020-01-01
Payer: MEDICARE

## 2020-01-01 VITALS
HEIGHT: 69 IN | BODY MASS INDEX: 17.48 KG/M2 | DIASTOLIC BLOOD PRESSURE: 67 MMHG | TEMPERATURE: 97.1 F | SYSTOLIC BLOOD PRESSURE: 109 MMHG | HEART RATE: 86 BPM | WEIGHT: 118 LBS | OXYGEN SATURATION: 97 % | RESPIRATION RATE: 18 BRPM

## 2020-01-01 VITALS
OXYGEN SATURATION: 100 % | RESPIRATION RATE: 20 BRPM | SYSTOLIC BLOOD PRESSURE: 91 MMHG | HEART RATE: 76 BPM | DIASTOLIC BLOOD PRESSURE: 78 MMHG

## 2020-01-01 VITALS
DIASTOLIC BLOOD PRESSURE: 57 MMHG | SYSTOLIC BLOOD PRESSURE: 107 MMHG | HEIGHT: 69 IN | RESPIRATION RATE: 20 BRPM | HEART RATE: 66 BPM | WEIGHT: 121 LBS | BODY MASS INDEX: 17.92 KG/M2 | OXYGEN SATURATION: 97 % | TEMPERATURE: 97.8 F

## 2020-01-01 VITALS
DIASTOLIC BLOOD PRESSURE: 64 MMHG | OXYGEN SATURATION: 94 % | HEART RATE: 108 BPM | RESPIRATION RATE: 24 BRPM | TEMPERATURE: 99.4 F | SYSTOLIC BLOOD PRESSURE: 120 MMHG

## 2020-01-01 VITALS
TEMPERATURE: 97.7 F | SYSTOLIC BLOOD PRESSURE: 90 MMHG | BODY MASS INDEX: 17.48 KG/M2 | RESPIRATION RATE: 20 BRPM | WEIGHT: 118 LBS | OXYGEN SATURATION: 90 % | DIASTOLIC BLOOD PRESSURE: 60 MMHG | HEART RATE: 90 BPM | HEIGHT: 69 IN

## 2020-01-01 VITALS
OXYGEN SATURATION: 90 % | DIASTOLIC BLOOD PRESSURE: 60 MMHG | SYSTOLIC BLOOD PRESSURE: 110 MMHG | RESPIRATION RATE: 42 BRPM | TEMPERATURE: 102.7 F | HEART RATE: 122 BPM

## 2020-01-01 DIAGNOSIS — S39.012A BACK STRAIN, INITIAL ENCOUNTER: Primary | ICD-10-CM

## 2020-01-01 DIAGNOSIS — R19.7 DIARRHEA, UNSPECIFIED TYPE: ICD-10-CM

## 2020-01-01 DIAGNOSIS — Z71.89 GOALS OF CARE, COUNSELING/DISCUSSION: ICD-10-CM

## 2020-01-01 DIAGNOSIS — M48.062 SPINAL STENOSIS OF LUMBAR REGION WITH NEUROGENIC CLAUDICATION: Primary | ICD-10-CM

## 2020-01-01 DIAGNOSIS — S32.030S CLOSED COMPRESSION FRACTURE OF THIRD LUMBAR VERTEBRA, SEQUELA: ICD-10-CM

## 2020-01-01 DIAGNOSIS — S32.030A COMPRESSION FRACTURE OF L3 LUMBAR VERTEBRA, CLOSED, INITIAL ENCOUNTER (HCC): ICD-10-CM

## 2020-01-01 DIAGNOSIS — R00.2 PALPITATIONS: Primary | ICD-10-CM

## 2020-01-01 DIAGNOSIS — R41.0 DELIRIUM: ICD-10-CM

## 2020-01-01 LAB
ALBUMIN SERPL-MCNC: 3.7 G/DL (ref 3.4–5)
ALBUMIN/GLOB SERPL: 1.1 {RATIO} (ref 0.8–1.7)
ALP SERPL-CCNC: 193 U/L (ref 45–117)
ALT SERPL-CCNC: 34 U/L (ref 16–61)
ANION GAP SERPL CALC-SCNC: 4 MMOL/L (ref 3–18)
ANION GAP SERPL CALC-SCNC: 6 MMOL/L (ref 3–18)
APPEARANCE UR: CLEAR
APPEARANCE UR: CLEAR
AST SERPL-CCNC: 35 U/L (ref 10–38)
ATRIAL RATE: 267 BPM
ATRIAL RATE: 80 BPM
BACTERIA SPEC CULT: NORMAL
BACTERIA SPEC CULT: NORMAL
BASOPHILS # BLD: 0 K/UL (ref 0–0.06)
BASOPHILS # BLD: 0 K/UL (ref 0–0.06)
BASOPHILS # BLD: 0.1 K/UL (ref 0–0.1)
BASOPHILS # BLD: 0.1 K/UL (ref 0–0.1)
BASOPHILS NFR BLD: 0 % (ref 0–3)
BASOPHILS NFR BLD: 0 % (ref 0–3)
BASOPHILS NFR BLD: 1 % (ref 0–2)
BASOPHILS NFR BLD: 1 % (ref 0–2)
BILIRUB DIRECT SERPL-MCNC: 0.2 MG/DL (ref 0–0.2)
BILIRUB SERPL-MCNC: 0.8 MG/DL (ref 0.2–1)
BILIRUB UR QL: NEGATIVE
BILIRUB UR QL: NEGATIVE
BUN SERPL-MCNC: 28 MG/DL (ref 7–18)
BUN SERPL-MCNC: 28 MG/DL (ref 7–18)
BUN SERPL-MCNC: 35 MG/DL (ref 7–18)
BUN SERPL-MCNC: 38 MG/DL (ref 7–18)
BUN/CREAT SERPL: 25 (ref 12–20)
BUN/CREAT SERPL: 25 (ref 12–20)
BUN/CREAT SERPL: 33 (ref 12–20)
BUN/CREAT SERPL: 37 (ref 12–20)
C DIFF TOX GENS STL QL NAA+PROBE: POSITIVE
CALCIUM SERPL-MCNC: 8.5 MG/DL (ref 8.5–10.1)
CALCIUM SERPL-MCNC: 8.8 MG/DL (ref 8.5–10.1)
CALCIUM SERPL-MCNC: 8.9 MG/DL (ref 8.5–10.1)
CALCIUM SERPL-MCNC: 9.2 MG/DL (ref 8.5–10.1)
CALCULATED R AXIS, ECG10: 63 DEGREES
CALCULATED R AXIS, ECG10: 71 DEGREES
CALCULATED T AXIS, ECG11: 43 DEGREES
CALCULATED T AXIS, ECG11: 77 DEGREES
CHLORIDE SERPL-SCNC: 102 MMOL/L (ref 100–111)
CHLORIDE SERPL-SCNC: 103 MMOL/L (ref 100–111)
CHLORIDE SERPL-SCNC: 103 MMOL/L (ref 100–111)
CHLORIDE SERPL-SCNC: 108 MMOL/L (ref 100–111)
CK MB CFR SERPL CALC: 3.3 % (ref 0–4)
CK MB SERPL-MCNC: 4.2 NG/ML (ref 5–25)
CK SERPL-CCNC: 128 U/L (ref 39–308)
CO2 SERPL-SCNC: 28 MMOL/L (ref 21–32)
CO2 SERPL-SCNC: 29 MMOL/L (ref 21–32)
CO2 SERPL-SCNC: 33 MMOL/L (ref 21–32)
CO2 SERPL-SCNC: 34 MMOL/L (ref 21–32)
COLOR UR: YELLOW
COLOR UR: YELLOW
CREAT SERPL-MCNC: 1.03 MG/DL (ref 0.6–1.3)
CREAT SERPL-MCNC: 1.06 MG/DL (ref 0.6–1.3)
CREAT SERPL-MCNC: 1.1 MG/DL (ref 0.6–1.3)
CREAT SERPL-MCNC: 1.14 MG/DL (ref 0.6–1.3)
DIAGNOSIS, 93000: NORMAL
DIAGNOSIS, 93000: NORMAL
DIFFERENTIAL METHOD BLD: ABNORMAL
EOSINOPHIL # BLD: 0 K/UL (ref 0–0.4)
EOSINOPHIL # BLD: 0 K/UL (ref 0–0.4)
EOSINOPHIL # BLD: 0.1 K/UL (ref 0–0.4)
EOSINOPHIL # BLD: 0.1 K/UL (ref 0–0.4)
EOSINOPHIL NFR BLD: 0 % (ref 0–5)
EOSINOPHIL NFR BLD: 0 % (ref 0–5)
EOSINOPHIL NFR BLD: 1 % (ref 0–5)
EOSINOPHIL NFR BLD: 2 % (ref 0–5)
ERYTHROCYTE [DISTWIDTH] IN BLOOD BY AUTOMATED COUNT: 14 % (ref 11.6–14.5)
ERYTHROCYTE [DISTWIDTH] IN BLOOD BY AUTOMATED COUNT: 14.4 % (ref 11.6–14.5)
ERYTHROCYTE [DISTWIDTH] IN BLOOD BY AUTOMATED COUNT: 14.4 % (ref 11.6–14.5)
ERYTHROCYTE [DISTWIDTH] IN BLOOD BY AUTOMATED COUNT: 15 % (ref 11.6–14.5)
GLOBULIN SER CALC-MCNC: 3.5 G/DL (ref 2–4)
GLUCOSE SERPL-MCNC: 113 MG/DL (ref 74–99)
GLUCOSE SERPL-MCNC: 131 MG/DL (ref 74–99)
GLUCOSE SERPL-MCNC: 152 MG/DL (ref 74–99)
GLUCOSE SERPL-MCNC: 98 MG/DL (ref 74–99)
GLUCOSE UR STRIP.AUTO-MCNC: NEGATIVE MG/DL
GLUCOSE UR STRIP.AUTO-MCNC: NEGATIVE MG/DL
HCT VFR BLD AUTO: 39.5 % (ref 36–48)
HCT VFR BLD AUTO: 41.2 % (ref 36–48)
HCT VFR BLD AUTO: 41.4 % (ref 36–48)
HCT VFR BLD AUTO: 42 % (ref 36–48)
HGB BLD-MCNC: 12.7 G/DL (ref 13–16)
HGB BLD-MCNC: 14.1 G/DL (ref 13–16)
HGB BLD-MCNC: 14.1 G/DL (ref 13–16)
HGB BLD-MCNC: 14.2 G/DL (ref 13–16)
HGB UR QL STRIP: NEGATIVE
HGB UR QL STRIP: NEGATIVE
INR PPP: 1.4 (ref 0.8–1.2)
INTERPRETATION: ABNORMAL
KETONES UR QL STRIP.AUTO: 15 MG/DL
KETONES UR QL STRIP.AUTO: 15 MG/DL
LEUKOCYTE ESTERASE UR QL STRIP.AUTO: NEGATIVE
LEUKOCYTE ESTERASE UR QL STRIP.AUTO: NEGATIVE
LIPASE SERPL-CCNC: 46 U/L (ref 73–393)
LYMPHOCYTES # BLD: 0.2 K/UL (ref 0.8–3.5)
LYMPHOCYTES # BLD: 1.1 K/UL (ref 0.9–3.6)
LYMPHOCYTES # BLD: 1.3 K/UL (ref 0.8–3.5)
LYMPHOCYTES # BLD: 1.5 K/UL (ref 0.9–3.6)
LYMPHOCYTES NFR BLD: 1 % (ref 20–51)
LYMPHOCYTES NFR BLD: 11 % (ref 21–52)
LYMPHOCYTES NFR BLD: 12 % (ref 20–51)
LYMPHOCYTES NFR BLD: 25 % (ref 21–52)
MAGNESIUM SERPL-MCNC: 2.3 MG/DL (ref 1.6–2.6)
MAGNESIUM SERPL-MCNC: 2.3 MG/DL (ref 1.6–2.6)
MCH RBC QN AUTO: 31.7 PG (ref 24–34)
MCH RBC QN AUTO: 32 PG (ref 24–34)
MCH RBC QN AUTO: 32.1 PG (ref 24–34)
MCH RBC QN AUTO: 32.3 PG (ref 24–34)
MCHC RBC AUTO-ENTMCNC: 32.2 G/DL (ref 31–37)
MCHC RBC AUTO-ENTMCNC: 33.8 G/DL (ref 31–37)
MCHC RBC AUTO-ENTMCNC: 34.1 G/DL (ref 31–37)
MCHC RBC AUTO-ENTMCNC: 34.2 G/DL (ref 31–37)
MCV RBC AUTO: 93.9 FL (ref 74–97)
MCV RBC AUTO: 94.5 FL (ref 74–97)
MCV RBC AUTO: 95 FL (ref 74–97)
MCV RBC AUTO: 98.5 FL (ref 74–97)
MONOCYTES # BLD: 0.7 K/UL (ref 0.05–1.2)
MONOCYTES # BLD: 0.7 K/UL (ref 0–1)
MONOCYTES # BLD: 1.2 K/UL (ref 0.05–1.2)
MONOCYTES # BLD: 1.7 K/UL (ref 0–1)
MONOCYTES NFR BLD: 12 % (ref 3–10)
MONOCYTES NFR BLD: 13 % (ref 3–10)
MONOCYTES NFR BLD: 16 % (ref 2–9)
MONOCYTES NFR BLD: 4 % (ref 2–9)
NEUTS BAND NFR BLD MANUAL: 13 % (ref 0–5)
NEUTS SEG # BLD: 17.3 K/UL (ref 1.8–8)
NEUTS SEG # BLD: 3.5 K/UL (ref 1.8–8)
NEUTS SEG # BLD: 7.1 K/UL (ref 1.8–8)
NEUTS SEG # BLD: 7.8 K/UL (ref 1.8–8)
NEUTS SEG NFR BLD: 60 % (ref 40–73)
NEUTS SEG NFR BLD: 72 % (ref 42–75)
NEUTS SEG NFR BLD: 74 % (ref 40–73)
NEUTS SEG NFR BLD: 82 % (ref 42–75)
NITRITE UR QL STRIP.AUTO: NEGATIVE
NITRITE UR QL STRIP.AUTO: NEGATIVE
PCR REFLEX: ABNORMAL
PH UR STRIP: 5 [PH] (ref 5–8)
PH UR STRIP: 5 [PH] (ref 5–8)
PHOSPHATE SERPL-MCNC: 1.6 MG/DL (ref 2.5–4.9)
PLATELET # BLD AUTO: 211 K/UL (ref 135–420)
PLATELET # BLD AUTO: 220 K/UL (ref 135–420)
PLATELET # BLD AUTO: 303 K/UL (ref 135–420)
PLATELET # BLD AUTO: 328 K/UL (ref 135–420)
PLATELET COMMENTS,PCOM: ABNORMAL
PLATELET COMMENTS,PCOM: ABNORMAL
PMV BLD AUTO: 10.2 FL (ref 9.2–11.8)
PMV BLD AUTO: 10.6 FL (ref 9.2–11.8)
PMV BLD AUTO: 10.8 FL (ref 9.2–11.8)
PMV BLD AUTO: 11 FL (ref 9.2–11.8)
POTASSIUM SERPL-SCNC: 3.5 MMOL/L (ref 3.5–5.5)
POTASSIUM SERPL-SCNC: 3.7 MMOL/L (ref 3.5–5.5)
POTASSIUM SERPL-SCNC: 3.7 MMOL/L (ref 3.5–5.5)
POTASSIUM SERPL-SCNC: 3.9 MMOL/L (ref 3.5–5.5)
PROT SERPL-MCNC: 7.2 G/DL (ref 6.4–8.2)
PROT UR STRIP-MCNC: NEGATIVE MG/DL
PROT UR STRIP-MCNC: NEGATIVE MG/DL
PROTHROMBIN TIME: 16.5 SEC (ref 11.5–15.2)
Q-T INTERVAL, ECG07: 416 MS
Q-T INTERVAL, ECG07: 448 MS
QRS DURATION, ECG06: 78 MS
QRS DURATION, ECG06: 80 MS
QTC CALCULATION (BEZET), ECG08: 429 MS
QTC CALCULATION (BEZET), ECG08: 465 MS
RBC # BLD AUTO: 4.01 M/UL (ref 4.7–5.5)
RBC # BLD AUTO: 4.36 M/UL (ref 4.7–5.5)
RBC # BLD AUTO: 4.41 M/UL (ref 4.7–5.5)
RBC # BLD AUTO: 4.42 M/UL (ref 4.7–5.5)
RBC MORPH BLD: ABNORMAL
RBC MORPH BLD: ABNORMAL
SERVICE CMNT-IMP: NORMAL
SODIUM SERPL-SCNC: 137 MMOL/L (ref 136–145)
SODIUM SERPL-SCNC: 140 MMOL/L (ref 136–145)
SODIUM SERPL-SCNC: 142 MMOL/L (ref 136–145)
SODIUM SERPL-SCNC: 143 MMOL/L (ref 136–145)
SP GR UR REFRACTOMETRY: 1.01 (ref 1–1.03)
SP GR UR REFRACTOMETRY: 1.02 (ref 1–1.03)
TROPONIN I SERPL-MCNC: <0.02 NG/ML (ref 0–0.04)
TROPONIN I SERPL-MCNC: <0.02 NG/ML (ref 0–0.04)
UROBILINOGEN UR QL STRIP.AUTO: 0.2 EU/DL (ref 0.2–1)
UROBILINOGEN UR QL STRIP.AUTO: 1 EU/DL (ref 0.2–1)
VENTRICULAR RATE, ECG03: 64 BPM
VENTRICULAR RATE, ECG03: 65 BPM
WBC # BLD AUTO: 10.8 K/UL (ref 4.6–13.2)
WBC # BLD AUTO: 18.2 K/UL (ref 4.6–13.2)
WBC # BLD AUTO: 5.9 K/UL (ref 4.6–13.2)
WBC # BLD AUTO: 9.5 K/UL (ref 4.6–13.2)

## 2020-01-01 PROCEDURE — 74011000250 HC RX REV CODE- 250: Performed by: HOSPITALIST

## 2020-01-01 PROCEDURE — 65270000029 HC RM PRIVATE

## 2020-01-01 PROCEDURE — 0651 HSPC ROUTINE HOME CARE

## 2020-01-01 PROCEDURE — 81003 URINALYSIS AUTO W/O SCOPE: CPT

## 2020-01-01 PROCEDURE — 97162 PT EVAL MOD COMPLEX 30 MIN: CPT

## 2020-01-01 PROCEDURE — 74011250637 HC RX REV CODE- 250/637: Performed by: INTERNAL MEDICINE

## 2020-01-01 PROCEDURE — 92526 ORAL FUNCTION THERAPY: CPT

## 2020-01-01 PROCEDURE — 74011000250 HC RX REV CODE- 250: Performed by: INTERNAL MEDICINE

## 2020-01-01 PROCEDURE — 99282 EMERGENCY DEPT VISIT SF MDM: CPT

## 2020-01-01 PROCEDURE — 97530 THERAPEUTIC ACTIVITIES: CPT

## 2020-01-01 PROCEDURE — 97165 OT EVAL LOW COMPLEX 30 MIN: CPT

## 2020-01-01 PROCEDURE — 76450000000

## 2020-01-01 PROCEDURE — 0107U C DIFF TOX AG DETCJ IA STOOL: CPT

## 2020-01-01 PROCEDURE — 94640 AIRWAY INHALATION TREATMENT: CPT

## 2020-01-01 PROCEDURE — 74011000250 HC RX REV CODE- 250: Performed by: PHYSICIAN ASSISTANT

## 2020-01-01 PROCEDURE — 77030040393 HC DRSG OPTIFOAM GENT MDII -B

## 2020-01-01 PROCEDURE — 92610 EVALUATE SWALLOWING FUNCTION: CPT

## 2020-01-01 PROCEDURE — 36415 COLL VENOUS BLD VENIPUNCTURE: CPT

## 2020-01-01 PROCEDURE — 83735 ASSAY OF MAGNESIUM: CPT

## 2020-01-01 PROCEDURE — 80048 BASIC METABOLIC PNL TOTAL CA: CPT

## 2020-01-01 PROCEDURE — 85025 COMPLETE CBC W/AUTO DIFF WBC: CPT

## 2020-01-01 PROCEDURE — 77030041076 HC DRSG AG OPTICELL MDII -A

## 2020-01-01 PROCEDURE — 3331090004 HSPC SERVICE INTENSITY ADD-ON

## 2020-01-01 PROCEDURE — 71046 X-RAY EXAM CHEST 2 VIEWS: CPT

## 2020-01-01 PROCEDURE — 3336500001 HSPC ELECTION

## 2020-01-01 PROCEDURE — 85610 PROTHROMBIN TIME: CPT

## 2020-01-01 PROCEDURE — G0299 HHS/HOSPICE OF RN EA 15 MIN: HCPCS

## 2020-01-01 PROCEDURE — 87493 C DIFF AMPLIFIED PROBE: CPT

## 2020-01-01 PROCEDURE — 93005 ELECTROCARDIOGRAM TRACING: CPT

## 2020-01-01 PROCEDURE — 99284 EMERGENCY DEPT VISIT MOD MDM: CPT

## 2020-01-01 PROCEDURE — 74011250637 HC RX REV CODE- 250/637: Performed by: HOSPITALIST

## 2020-01-01 PROCEDURE — 96374 THER/PROPH/DIAG INJ IV PUSH: CPT

## 2020-01-01 PROCEDURE — 80076 HEPATIC FUNCTION PANEL: CPT

## 2020-01-01 PROCEDURE — 74176 CT ABD & PELVIS W/O CONTRAST: CPT

## 2020-01-01 PROCEDURE — 74011250636 HC RX REV CODE- 250/636: Performed by: INTERNAL MEDICINE

## 2020-01-01 PROCEDURE — 84484 ASSAY OF TROPONIN QUANT: CPT

## 2020-01-01 PROCEDURE — 83690 ASSAY OF LIPASE: CPT

## 2020-01-01 PROCEDURE — 94761 N-INVAS EAR/PLS OXIMETRY MLT: CPT

## 2020-01-01 PROCEDURE — 82550 ASSAY OF CK (CPK): CPT

## 2020-01-01 PROCEDURE — 77030027138 HC INCENT SPIROMETER -A

## 2020-01-01 PROCEDURE — 84100 ASSAY OF PHOSPHORUS: CPT

## 2020-01-01 PROCEDURE — 97535 SELF CARE MNGMENT TRAINING: CPT

## 2020-01-01 PROCEDURE — 77030040831 HC BAG URINE DRNG MDII -A

## 2020-01-01 PROCEDURE — 74011250636 HC RX REV CODE- 250/636: Performed by: EMERGENCY MEDICINE

## 2020-01-01 PROCEDURE — 97161 PT EVAL LOW COMPLEX 20 MIN: CPT

## 2020-01-01 PROCEDURE — HOSPICE MEDICATION HC HH HOSPICE MEDICATION

## 2020-01-01 RX ORDER — OLANZAPINE 2.5 MG/1
2.5 TABLET ORAL EVERY EVENING
Status: DISCONTINUED | OUTPATIENT
Start: 2020-01-01 | End: 2020-01-01

## 2020-01-01 RX ORDER — IPRATROPIUM BROMIDE AND ALBUTEROL SULFATE 2.5; .5 MG/3ML; MG/3ML
3 SOLUTION RESPIRATORY (INHALATION)
Status: DISCONTINUED | OUTPATIENT
Start: 2020-01-01 | End: 2020-01-01 | Stop reason: HOSPADM

## 2020-01-01 RX ORDER — LEVOTHYROXINE SODIUM 25 UG/1
25 TABLET ORAL
COMMUNITY
Start: 2015-02-16

## 2020-01-01 RX ORDER — IPRATROPIUM BROMIDE 0.5 MG/2.5ML
0.5 SOLUTION RESPIRATORY (INHALATION)
Status: DISCONTINUED | OUTPATIENT
Start: 2020-01-01 | End: 2020-01-01 | Stop reason: HOSPADM

## 2020-01-01 RX ORDER — TIOTROPIUM BROMIDE INHALATION SPRAY 3.12 UG/1
SPRAY, METERED RESPIRATORY (INHALATION)
COMMUNITY
Start: 2020-01-01

## 2020-01-01 RX ORDER — ALENDRONATE SODIUM 70 MG/1
TABLET ORAL
COMMUNITY
Start: 2020-01-01 | End: 2020-01-01

## 2020-01-01 RX ORDER — HEPARIN SODIUM 5000 [USP'U]/ML
5000 INJECTION, SOLUTION INTRAVENOUS; SUBCUTANEOUS EVERY 8 HOURS
Status: DISCONTINUED | OUTPATIENT
Start: 2020-01-01 | End: 2020-01-01

## 2020-01-01 RX ORDER — CYCLOBENZAPRINE HCL 5 MG
5 TABLET ORAL
Qty: 20 TAB | Refills: 0 | Status: SHIPPED | OUTPATIENT
Start: 2020-01-01 | End: 2020-01-01

## 2020-01-01 RX ORDER — MIRTAZAPINE 15 MG/1
15 TABLET, ORALLY DISINTEGRATING ORAL
Status: DISCONTINUED | OUTPATIENT
Start: 2020-01-01 | End: 2020-01-01 | Stop reason: HOSPADM

## 2020-01-01 RX ORDER — TRAMADOL HYDROCHLORIDE 50 MG/1
50 TABLET ORAL AS NEEDED
COMMUNITY
Start: 2020-01-01 | End: 2020-01-01

## 2020-01-01 RX ORDER — ALBUTEROL SULFATE 90 UG/1
2 AEROSOL, METERED RESPIRATORY (INHALATION)
COMMUNITY

## 2020-01-01 RX ORDER — TAMSULOSIN HYDROCHLORIDE 0.4 MG/1
0.4 CAPSULE ORAL
Qty: 30 CAP | Refills: 0 | Status: SHIPPED | OUTPATIENT
Start: 2020-01-01

## 2020-01-01 RX ORDER — HYDROCODONE BITARTRATE AND ACETAMINOPHEN 5; 325 MG/1; MG/1
1 TABLET ORAL
Qty: 9 TAB | Refills: 0 | Status: SHIPPED | OUTPATIENT
Start: 2020-01-01 | End: 2020-06-22

## 2020-01-01 RX ORDER — VANCOMYCIN HYDROCHLORIDE 250 MG/5ML
125 POWDER, FOR SOLUTION ORAL EVERY 6 HOURS
Status: DISCONTINUED | OUTPATIENT
Start: 2020-01-01 | End: 2020-01-01 | Stop reason: HOSPADM

## 2020-01-01 RX ORDER — THERA TABS 400 MCG
1 TAB ORAL DAILY
Status: DISCONTINUED | OUTPATIENT
Start: 2020-01-01 | End: 2020-01-01 | Stop reason: HOSPADM

## 2020-01-01 RX ORDER — LOPERAMIDE HYDROCHLORIDE 2 MG/1
2 CAPSULE ORAL
Status: DISCONTINUED | OUTPATIENT
Start: 2020-01-01 | End: 2020-01-01 | Stop reason: HOSPADM

## 2020-01-01 RX ORDER — HYDROMORPHONE HYDROCHLORIDE 2 MG/1
2 TABLET ORAL EVERY 12 HOURS
Status: DISCONTINUED | OUTPATIENT
Start: 2020-01-01 | End: 2020-01-01

## 2020-01-01 RX ORDER — ATORVASTATIN CALCIUM 10 MG/1
10 TABLET, FILM COATED ORAL
Status: DISCONTINUED | OUTPATIENT
Start: 2020-01-01 | End: 2020-01-01 | Stop reason: HOSPADM

## 2020-01-01 RX ORDER — ACETAMINOPHEN 325 MG/1
650 TABLET ORAL
Qty: 20 TAB | Refills: 0 | Status: SHIPPED | OUTPATIENT
Start: 2020-01-01

## 2020-01-01 RX ORDER — QUETIAPINE FUMARATE 25 MG/1
12.5 TABLET, FILM COATED ORAL DAILY
Status: DISCONTINUED | OUTPATIENT
Start: 2020-01-01 | End: 2020-01-01 | Stop reason: HOSPADM

## 2020-01-01 RX ORDER — TAMSULOSIN HYDROCHLORIDE 0.4 MG/1
0.4 CAPSULE ORAL
Status: DISCONTINUED | OUTPATIENT
Start: 2020-01-01 | End: 2020-01-01 | Stop reason: HOSPADM

## 2020-01-01 RX ORDER — ACETAMINOPHEN 325 MG/1
650 TABLET ORAL
Status: DISCONTINUED | OUTPATIENT
Start: 2020-01-01 | End: 2020-01-01 | Stop reason: HOSPADM

## 2020-01-01 RX ORDER — SIMVASTATIN 20 MG/1
20 TABLET, FILM COATED ORAL
COMMUNITY
Start: 2015-02-16 | End: 2020-01-01

## 2020-01-01 RX ORDER — IPRATROPIUM BROMIDE 0.5 MG/2.5ML
0.5 SOLUTION RESPIRATORY (INHALATION)
Status: DISCONTINUED | OUTPATIENT
Start: 2020-01-01 | End: 2020-01-01

## 2020-01-01 RX ORDER — VANCOMYCIN HYDROCHLORIDE 250 MG/5ML
125 POWDER, FOR SOLUTION ORAL EVERY 6 HOURS
Qty: 80 ML | Refills: 0 | Status: SHIPPED | OUTPATIENT
Start: 2020-01-01 | End: 2020-06-27

## 2020-01-01 RX ORDER — METRONIDAZOLE 500 MG/1
500 TABLET ORAL 3 TIMES DAILY
Status: DISCONTINUED | OUTPATIENT
Start: 2020-01-01 | End: 2020-01-01 | Stop reason: CLARIF

## 2020-01-01 RX ORDER — ARFORMOTEROL TARTRATE 15 UG/2ML
15 SOLUTION RESPIRATORY (INHALATION)
Status: DISCONTINUED | OUTPATIENT
Start: 2020-01-01 | End: 2020-01-01 | Stop reason: HOSPADM

## 2020-01-01 RX ORDER — IPRATROPIUM BROMIDE AND ALBUTEROL SULFATE 2.5; .5 MG/3ML; MG/3ML
3 SOLUTION RESPIRATORY (INHALATION)
Status: COMPLETED | OUTPATIENT
Start: 2020-01-01 | End: 2020-01-01

## 2020-01-01 RX ORDER — LEVOTHYROXINE SODIUM 25 UG/1
25 TABLET ORAL
Status: DISCONTINUED | OUTPATIENT
Start: 2020-01-01 | End: 2020-01-01 | Stop reason: HOSPADM

## 2020-01-01 RX ORDER — LORAZEPAM 1 MG/1
1 TABLET ORAL
Status: DISCONTINUED | OUTPATIENT
Start: 2020-01-01 | End: 2020-01-01

## 2020-01-01 RX ORDER — BUDESONIDE 0.5 MG/2ML
500 INHALANT ORAL
Status: DISCONTINUED | OUTPATIENT
Start: 2020-01-01 | End: 2020-01-01 | Stop reason: HOSPADM

## 2020-01-01 RX ORDER — ALENDRONATE SODIUM 70 MG/1
70 TABLET ORAL
Status: DISCONTINUED | OUTPATIENT
Start: 2020-01-01 | End: 2020-01-01

## 2020-01-01 RX ORDER — MIRTAZAPINE 15 MG/1
15 TABLET, ORALLY DISINTEGRATING ORAL
Qty: 30 TAB | Refills: 0 | Status: SHIPPED | OUTPATIENT
Start: 2020-01-01

## 2020-01-01 RX ORDER — KETOROLAC TROMETHAMINE 15 MG/ML
15 INJECTION, SOLUTION INTRAMUSCULAR; INTRAVENOUS
Status: DISPENSED | OUTPATIENT
Start: 2020-01-01 | End: 2020-01-01

## 2020-01-01 RX ORDER — DEXAMETHASONE SODIUM PHOSPHATE 4 MG/ML
4 INJECTION, SOLUTION INTRA-ARTICULAR; INTRALESIONAL; INTRAMUSCULAR; INTRAVENOUS; SOFT TISSUE EVERY 6 HOURS
Status: DISCONTINUED | OUTPATIENT
Start: 2020-01-01 | End: 2020-01-01

## 2020-01-01 RX ORDER — METRONIDAZOLE 500 MG/1
500 TABLET ORAL 3 TIMES DAILY
Status: DISCONTINUED | OUTPATIENT
Start: 2020-01-01 | End: 2020-01-01

## 2020-01-01 RX ORDER — HYDROCODONE BITARTRATE AND ACETAMINOPHEN 5; 325 MG/1; MG/1
1 TABLET ORAL
Status: DISCONTINUED | OUTPATIENT
Start: 2020-01-01 | End: 2020-01-01 | Stop reason: HOSPADM

## 2020-01-01 RX ORDER — QUETIAPINE FUMARATE 25 MG/1
25 TABLET, FILM COATED ORAL DAILY
Qty: 30 TAB | Refills: 0 | Status: SHIPPED | OUTPATIENT
Start: 2020-01-01

## 2020-01-01 RX ADMIN — THERA TABS 1 TABLET: TAB at 09:52

## 2020-01-01 RX ADMIN — QUETIAPINE FUMARATE 12.5 MG: 25 TABLET ORAL at 10:27

## 2020-01-01 RX ADMIN — IPRATROPIUM BROMIDE 0.5 MG: 0.5 SOLUTION RESPIRATORY (INHALATION) at 21:57

## 2020-01-01 RX ADMIN — DIBASIC SODIUM PHOSPHATE, MONOBASIC POTASSIUM PHOSPHATE AND MONOBASIC SODIUM PHOSPHATE 1 TABLET: 852; 155; 130 TABLET ORAL at 10:00

## 2020-01-01 RX ADMIN — MIRTAZAPINE 15 MG: 15 TABLET, ORALLY DISINTEGRATING ORAL at 23:06

## 2020-01-01 RX ADMIN — MIRTAZAPINE 15 MG: 15 TABLET, ORALLY DISINTEGRATING ORAL at 22:08

## 2020-01-01 RX ADMIN — THERA TABS 1 TABLET: TAB at 10:27

## 2020-01-01 RX ADMIN — BUDESONIDE 500 MCG: 0.5 INHALANT RESPIRATORY (INHALATION) at 07:30

## 2020-01-01 RX ADMIN — IPRATROPIUM BROMIDE 0.5 MG: 0.5 SOLUTION RESPIRATORY (INHALATION) at 02:58

## 2020-01-01 RX ADMIN — OLANZAPINE 2.5 MG: 2.5 TABLET, FILM COATED ORAL at 18:24

## 2020-01-01 RX ADMIN — BUDESONIDE 500 MCG: 0.5 INHALANT RESPIRATORY (INHALATION) at 07:55

## 2020-01-01 RX ADMIN — IPRATROPIUM BROMIDE 0.5 MG: 0.5 SOLUTION RESPIRATORY (INHALATION) at 14:35

## 2020-01-01 RX ADMIN — VANCOMYCIN HYDROCHLORIDE 125 MG: KIT at 06:46

## 2020-01-01 RX ADMIN — METRONIDAZOLE 500 MG: 500 TABLET ORAL at 17:10

## 2020-01-01 RX ADMIN — IPRATROPIUM BROMIDE 0.5 MG: 0.5 SOLUTION RESPIRATORY (INHALATION) at 07:05

## 2020-01-01 RX ADMIN — HEPARIN SODIUM 5000 UNITS: 5000 INJECTION INTRAVENOUS; SUBCUTANEOUS at 06:12

## 2020-01-01 RX ADMIN — IPRATROPIUM BROMIDE 0.5 MG: 0.5 SOLUTION RESPIRATORY (INHALATION) at 14:51

## 2020-01-01 RX ADMIN — THERA TABS 1 TABLET: TAB at 09:28

## 2020-01-01 RX ADMIN — IPRATROPIUM BROMIDE AND ALBUTEROL SULFATE 3 ML: .5; 3 SOLUTION RESPIRATORY (INHALATION) at 13:34

## 2020-01-01 RX ADMIN — METRONIDAZOLE 500 MG: 500 TABLET ORAL at 21:17

## 2020-01-01 RX ADMIN — VANCOMYCIN HYDROCHLORIDE 125 MG: KIT at 18:32

## 2020-01-01 RX ADMIN — METRONIDAZOLE 500 MG: 500 TABLET ORAL at 16:00

## 2020-01-01 RX ADMIN — THERA TABS 1 TABLET: TAB at 18:17

## 2020-01-01 RX ADMIN — TAMSULOSIN HYDROCHLORIDE 0.4 MG: 0.4 CAPSULE ORAL at 22:05

## 2020-01-01 RX ADMIN — TAMSULOSIN HYDROCHLORIDE 0.4 MG: 0.4 CAPSULE ORAL at 22:08

## 2020-01-01 RX ADMIN — IPRATROPIUM BROMIDE 0.5 MG: 0.5 SOLUTION RESPIRATORY (INHALATION) at 14:15

## 2020-01-01 RX ADMIN — METRONIDAZOLE 500 MG: 500 TABLET ORAL at 08:26

## 2020-01-01 RX ADMIN — IPRATROPIUM BROMIDE 0.5 MG: 0.5 SOLUTION RESPIRATORY (INHALATION) at 09:48

## 2020-01-01 RX ADMIN — VANCOMYCIN HYDROCHLORIDE 125 MG: KIT at 11:46

## 2020-01-01 RX ADMIN — ATORVASTATIN CALCIUM 10 MG: 10 TABLET, FILM COATED ORAL at 21:38

## 2020-01-01 RX ADMIN — BUDESONIDE 500 MCG: 0.5 INHALANT RESPIRATORY (INHALATION) at 20:33

## 2020-01-01 RX ADMIN — MIRTAZAPINE 15 MG: 15 TABLET, ORALLY DISINTEGRATING ORAL at 22:05

## 2020-01-01 RX ADMIN — METRONIDAZOLE 500 MG: 500 TABLET ORAL at 09:52

## 2020-01-01 RX ADMIN — QUETIAPINE FUMARATE 12.5 MG: 25 TABLET ORAL at 12:36

## 2020-01-01 RX ADMIN — HYDROCODONE BITARTRATE AND ACETAMINOPHEN 1 TABLET: 5; 325 TABLET ORAL at 15:23

## 2020-01-01 RX ADMIN — DIBASIC SODIUM PHOSPHATE, MONOBASIC POTASSIUM PHOSPHATE AND MONOBASIC SODIUM PHOSPHATE 1 TABLET: 852; 155; 130 TABLET ORAL at 17:29

## 2020-01-01 RX ADMIN — HEPARIN SODIUM 5000 UNITS: 5000 INJECTION INTRAVENOUS; SUBCUTANEOUS at 00:56

## 2020-01-01 RX ADMIN — ACETAMINOPHEN 650 MG: 325 TABLET ORAL at 09:30

## 2020-01-01 RX ADMIN — THERA TABS 1 TABLET: TAB at 08:26

## 2020-01-01 RX ADMIN — LORAZEPAM 1 MG: 1 TABLET ORAL at 23:50

## 2020-01-01 RX ADMIN — ATORVASTATIN CALCIUM 10 MG: 10 TABLET, FILM COATED ORAL at 22:05

## 2020-01-01 RX ADMIN — HYDROMORPHONE HYDROCHLORIDE 2 MG: 2 TABLET ORAL at 11:30

## 2020-01-01 RX ADMIN — IPRATROPIUM BROMIDE 0.5 MG: 0.5 SOLUTION RESPIRATORY (INHALATION) at 19:16

## 2020-01-01 RX ADMIN — HYDROCODONE BITARTRATE AND ACETAMINOPHEN 1 TABLET: 5; 325 TABLET ORAL at 16:57

## 2020-01-01 RX ADMIN — ATORVASTATIN CALCIUM 10 MG: 10 TABLET, FILM COATED ORAL at 21:24

## 2020-01-01 RX ADMIN — VANCOMYCIN HYDROCHLORIDE 125 MG: KIT at 05:31

## 2020-01-01 RX ADMIN — ARFORMOTEROL TARTRATE 15 MCG: 15 SOLUTION RESPIRATORY (INHALATION) at 21:57

## 2020-01-01 RX ADMIN — ARFORMOTEROL TARTRATE: 15 SOLUTION RESPIRATORY (INHALATION) at 09:48

## 2020-01-01 RX ADMIN — LEVOTHYROXINE SODIUM 25 MCG: 0.03 TABLET ORAL at 06:12

## 2020-01-01 RX ADMIN — ARFORMOTEROL TARTRATE 15 MCG: 15 SOLUTION RESPIRATORY (INHALATION) at 07:55

## 2020-01-01 RX ADMIN — METRONIDAZOLE 500 MG: 500 TABLET ORAL at 21:24

## 2020-01-01 RX ADMIN — IPRATROPIUM BROMIDE 0.5 MG: 0.5 SOLUTION RESPIRATORY (INHALATION) at 20:33

## 2020-01-01 RX ADMIN — ATORVASTATIN CALCIUM 10 MG: 10 TABLET, FILM COATED ORAL at 22:08

## 2020-01-01 RX ADMIN — ATORVASTATIN CALCIUM 10 MG: 10 TABLET, FILM COATED ORAL at 21:09

## 2020-01-01 RX ADMIN — BUDESONIDE 500 MCG: 0.5 INHALANT RESPIRATORY (INHALATION) at 07:04

## 2020-01-01 RX ADMIN — OLANZAPINE 2.5 MG: 2.5 TABLET, FILM COATED ORAL at 18:17

## 2020-01-01 RX ADMIN — HYDROCODONE BITARTRATE AND ACETAMINOPHEN 1 TABLET: 5; 325 TABLET ORAL at 04:28

## 2020-01-01 RX ADMIN — METRONIDAZOLE 500 MG: 500 TABLET ORAL at 18:17

## 2020-01-01 RX ADMIN — DEXAMETHASONE SODIUM PHOSPHATE 4 MG: 4 INJECTION, SOLUTION INTRA-ARTICULAR; INTRALESIONAL; INTRAMUSCULAR; INTRAVENOUS; SOFT TISSUE at 09:10

## 2020-01-01 RX ADMIN — IPRATROPIUM BROMIDE 0.5 MG: 0.5 SOLUTION RESPIRATORY (INHALATION) at 19:00

## 2020-01-01 RX ADMIN — DIBASIC SODIUM PHOSPHATE, MONOBASIC POTASSIUM PHOSPHATE AND MONOBASIC SODIUM PHOSPHATE 1 TABLET: 852; 155; 130 TABLET ORAL at 09:27

## 2020-01-01 RX ADMIN — METRONIDAZOLE 500 MG: 500 TABLET ORAL at 21:38

## 2020-01-01 RX ADMIN — HEPARIN SODIUM 5000 UNITS: 5000 INJECTION INTRAVENOUS; SUBCUTANEOUS at 15:41

## 2020-01-01 RX ADMIN — LORAZEPAM 1 MG: 1 TABLET ORAL at 23:04

## 2020-01-01 RX ADMIN — HYDROCODONE BITARTRATE AND ACETAMINOPHEN 1 TABLET: 5; 325 TABLET ORAL at 08:26

## 2020-01-01 RX ADMIN — HEPARIN SODIUM 5000 UNITS: 5000 INJECTION INTRAVENOUS; SUBCUTANEOUS at 15:10

## 2020-01-01 RX ADMIN — LEVOTHYROXINE SODIUM 25 MCG: 0.03 TABLET ORAL at 06:58

## 2020-01-01 RX ADMIN — HYDROCODONE BITARTRATE AND ACETAMINOPHEN 1 TABLET: 5; 325 TABLET ORAL at 12:23

## 2020-01-01 RX ADMIN — ARFORMOTEROL TARTRATE: 15 SOLUTION RESPIRATORY (INHALATION) at 19:07

## 2020-01-01 RX ADMIN — VANCOMYCIN HYDROCHLORIDE 125 MG: KIT at 12:00

## 2020-01-01 RX ADMIN — LORAZEPAM 1 MG: 1 TABLET ORAL at 01:58

## 2020-01-01 RX ADMIN — IPRATROPIUM BROMIDE 0.5 MG: 0.5 SOLUTION RESPIRATORY (INHALATION) at 01:40

## 2020-01-01 RX ADMIN — VANCOMYCIN HYDROCHLORIDE 125 MG: KIT at 17:29

## 2020-01-01 RX ADMIN — VANCOMYCIN HYDROCHLORIDE 125 MG: KIT at 06:34

## 2020-01-01 RX ADMIN — LORAZEPAM 1 MG: 1 TABLET ORAL at 18:19

## 2020-01-01 RX ADMIN — Medication 1 CAPSULE: at 11:00

## 2020-01-01 RX ADMIN — IPRATROPIUM BROMIDE 0.5 MG: 0.5 SOLUTION RESPIRATORY (INHALATION) at 01:45

## 2020-01-01 RX ADMIN — IPRATROPIUM BROMIDE 0.5 MG: 0.5 SOLUTION RESPIRATORY (INHALATION) at 07:30

## 2020-01-01 RX ADMIN — ARFORMOTEROL TARTRATE: 15 SOLUTION RESPIRATORY (INHALATION) at 17:34

## 2020-01-01 RX ADMIN — ATORVASTATIN CALCIUM 10 MG: 10 TABLET, FILM COATED ORAL at 21:17

## 2020-01-01 RX ADMIN — ATORVASTATIN CALCIUM 10 MG: 10 TABLET, FILM COATED ORAL at 23:04

## 2020-01-01 RX ADMIN — BUDESONIDE 500 MCG: 0.5 INHALANT RESPIRATORY (INHALATION) at 21:57

## 2020-01-01 RX ADMIN — METRONIDAZOLE 500 MG: 500 TABLET ORAL at 09:17

## 2020-01-01 RX ADMIN — IPRATROPIUM BROMIDE 0.5 MG: 0.5 SOLUTION RESPIRATORY (INHALATION) at 07:54

## 2020-01-01 RX ADMIN — HEPARIN SODIUM 5000 UNITS: 5000 INJECTION INTRAVENOUS; SUBCUTANEOUS at 00:55

## 2020-01-01 RX ADMIN — LOPERAMIDE HYDROCHLORIDE 2 MG: 2 CAPSULE ORAL at 11:00

## 2020-01-01 RX ADMIN — ACETAMINOPHEN 650 MG: 325 TABLET ORAL at 03:04

## 2020-01-01 RX ADMIN — DIBASIC SODIUM PHOSPHATE, MONOBASIC POTASSIUM PHOSPHATE AND MONOBASIC SODIUM PHOSPHATE 1 TABLET: 852; 155; 130 TABLET ORAL at 18:32

## 2020-01-01 RX ADMIN — ARFORMOTEROL TARTRATE 15 MCG: 15 SOLUTION RESPIRATORY (INHALATION) at 07:27

## 2020-01-01 RX ADMIN — LEVOTHYROXINE SODIUM 25 MCG: 0.03 TABLET ORAL at 06:46

## 2020-01-01 RX ADMIN — ARFORMOTEROL TARTRATE 15 MCG: 15 SOLUTION RESPIRATORY (INHALATION) at 07:04

## 2020-01-01 RX ADMIN — ACETAMINOPHEN 650 MG: 325 TABLET ORAL at 18:07

## 2020-01-01 RX ADMIN — ARFORMOTEROL TARTRATE 15 MCG: 15 SOLUTION RESPIRATORY (INHALATION) at 07:30

## 2020-01-01 RX ADMIN — VANCOMYCIN HYDROCHLORIDE 125 MG: KIT at 00:00

## 2020-01-01 RX ADMIN — TAMSULOSIN HYDROCHLORIDE 0.4 MG: 0.4 CAPSULE ORAL at 21:17

## 2020-01-01 RX ADMIN — ARFORMOTEROL TARTRATE 15 MCG: 15 SOLUTION RESPIRATORY (INHALATION) at 20:33

## 2020-01-01 RX ADMIN — VANCOMYCIN HYDROCHLORIDE 125 MG: KIT at 23:13

## 2020-01-01 RX ADMIN — LEVOTHYROXINE SODIUM 25 MCG: 0.03 TABLET ORAL at 05:31

## 2020-01-01 RX ADMIN — HYDROCODONE BITARTRATE AND ACETAMINOPHEN 1 TABLET: 5; 325 TABLET ORAL at 09:55

## 2020-01-01 RX ADMIN — VANCOMYCIN HYDROCHLORIDE 125 MG: KIT at 00:30

## 2020-01-01 RX ADMIN — ARFORMOTEROL TARTRATE: 15 SOLUTION RESPIRATORY (INHALATION) at 07:02

## 2020-01-01 RX ADMIN — IPRATROPIUM BROMIDE 0.5 MG: 0.5 SOLUTION RESPIRATORY (INHALATION) at 02:36

## 2020-01-01 RX ADMIN — TAMSULOSIN HYDROCHLORIDE 0.4 MG: 0.4 CAPSULE ORAL at 21:33

## 2020-01-01 RX ADMIN — VANCOMYCIN HYDROCHLORIDE 125 MG: KIT at 11:59

## 2020-01-01 RX ADMIN — HEPARIN SODIUM 5000 UNITS: 5000 INJECTION INTRAVENOUS; SUBCUTANEOUS at 06:58

## 2020-01-01 RX ADMIN — KETOROLAC TROMETHAMINE 15 MG: 15 INJECTION, SOLUTION INTRAMUSCULAR; INTRAVENOUS at 10:24

## 2020-01-01 RX ADMIN — LEVOTHYROXINE SODIUM 25 MCG: 0.03 TABLET ORAL at 04:28

## 2020-01-01 RX ADMIN — IPRATROPIUM BROMIDE 0.5 MG: 0.5 SOLUTION RESPIRATORY (INHALATION) at 19:07

## 2020-01-01 RX ADMIN — VANCOMYCIN HYDROCHLORIDE 125 MG: KIT at 18:07

## 2020-01-01 RX ADMIN — IPRATROPIUM BROMIDE 0.5 MG: 0.5 SOLUTION RESPIRATORY (INHALATION) at 07:02

## 2020-01-01 RX ADMIN — BUDESONIDE 500 MCG: 0.5 INHALANT RESPIRATORY (INHALATION) at 07:27

## 2020-01-01 RX ADMIN — QUETIAPINE FUMARATE 12.5 MG: 25 TABLET ORAL at 09:27

## 2020-01-01 RX ADMIN — LORAZEPAM 1 MG: 1 TABLET ORAL at 22:17

## 2020-01-01 RX ADMIN — OLANZAPINE 2.5 MG: 2.5 TABLET, FILM COATED ORAL at 17:10

## 2020-01-01 RX ADMIN — ARFORMOTEROL TARTRATE: 15 SOLUTION RESPIRATORY (INHALATION) at 19:16

## 2020-01-01 RX ADMIN — THERA TABS 1 TABLET: TAB at 09:17

## 2020-01-01 RX ADMIN — LORAZEPAM 1 MG: 1 TABLET ORAL at 02:27

## 2020-01-01 RX ADMIN — DIBASIC SODIUM PHOSPHATE, MONOBASIC POTASSIUM PHOSPHATE AND MONOBASIC SODIUM PHOSPHATE 1 TABLET: 852; 155; 130 TABLET ORAL at 18:07

## 2020-01-01 RX ADMIN — LEVOTHYROXINE SODIUM 25 MCG: 0.03 TABLET ORAL at 06:32

## 2020-01-01 RX ADMIN — ARFORMOTEROL TARTRATE: 15 SOLUTION RESPIRATORY (INHALATION) at 19:06

## 2020-01-01 RX ADMIN — PHYTONADIONE 5 MG: 10 INJECTION, EMULSION INTRAMUSCULAR; INTRAVENOUS; SUBCUTANEOUS at 13:54

## 2020-01-01 RX ADMIN — METHYLPREDNISOLONE SODIUM SUCCINATE 500 MG: 125 INJECTION, POWDER, FOR SOLUTION INTRAMUSCULAR; INTRAVENOUS at 05:35

## 2020-01-01 RX ADMIN — IPRATROPIUM BROMIDE 0.5 MG: 0.5 SOLUTION RESPIRATORY (INHALATION) at 07:28

## 2020-03-06 NOTE — DISCHARGE INSTRUCTIONS

## 2020-03-06 NOTE — ED PROVIDER NOTES
HPI patient says he woke up into the night last night and was not sure \"what woke me up\". He says he thinks he was having some palpitations or possibly some shortness of breath. Patient went back to sleep and when he woke up again this morning he recalls the incident earlier in the night and decided to come to the emergency room for evaluation. Present time, the patient denies any chest pain shortness of breath or palpitations. He is a somewhat vague historian and gives no further specifics. He says he came to the emergency room \"to be checked\". Past Medical History:  
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  
Procedure Laterality Date  HX CORONARY ARTERY BYPASS GRAFT  3/12  HX HERNIA REPAIR    
 HX HIP REPLACEMENT  '60 & '06  
 x 2  
 HX OTHER SURGICAL  10/9/12 [facility] Family History:  
Problem Relation Age of Onset  Cancer Mother  Emphysema Father Social History Socioeconomic History  Marital status:  Spouse name: Not on file  Number of children: Not on file  Years of education: Not on file  Highest education level: Not on file Occupational History  Occupation: Retired   Employer: OTHER  
 Comment:  Social Needs  Financial resource strain: Not on file  Food insecurity:  
  Worry: Not on file Inability: Not on file  Transportation needs:  
  Medical: Not on file Non-medical: Not on file Tobacco Use  Smoking status: Former Smoker Last attempt to quit: 1990 Years since quittin.1  Smokeless tobacco: Never Used Substance and Sexual Activity  Alcohol use: Yes Comment: social  
 Drug use: No  
 Sexual activity: Never Lifestyle  Physical activity:  
  Days per week: Not on file Minutes per session: Not on file  Stress: Not on file Relationships  Social connections:  
  Talks on phone: Not on file Gets together: Not on file Attends [employer] service: Not on file Active member of club or organization: Not on file Attends meetings of clubs or organizations: Not on file Relationship status: Not on file  Intimate partner violence:  
  Fear of current or ex partner: Not on file Emotionally abused: Not on file Physically abused: Not on file Forced sexual activity: Not on file Other Topics Concern  Not on file Social History Narrative  Not on file ALLERGIES: Patient has no known allergies. Review of Systems Constitutional: Negative. HENT: Negative. Eyes: Negative. Respiratory: Positive for shortness of breath. Cardiovascular: Positive for palpitations. Genitourinary: Negative. Musculoskeletal: Negative. Neurological: Negative. Psychiatric/Behavioral: Negative. Vitals:  
 20 1351 BP: 107/57 Pulse: 66 Resp: 20 Temp: 97.8 °F (36.6 °C) SpO2: 95% Weight: 54.9 kg (121 lb) Height: 5' 9\" (1.753 m) Physical Exam 
Vitals signs and nursing note reviewed. Constitutional:   
   Appearance: He is well-developed. HENT:  
   Head: Normocephalic and atraumatic. Eyes: Pupils: Pupils are equal, round, and reactive to light. Neck: Musculoskeletal: Neck supple. Cardiovascular:  
   Rate and Rhythm: Normal rate. Rhythm irregular. Pulmonary:  
   Effort: Pulmonary effort is normal.  
   Breath sounds: Normal breath sounds. Abdominal:  
   Palpations: Abdomen is soft. Musculoskeletal: Normal range of motion. Skin: 
   General: Skin is warm and dry. Neurological:  
   Mental Status: He is alert and oriented to person, place, and time. MDM Procedures EKG was read by myself at 1351 showing atrial fibrillation with ventricular response of 64 and no acute changes. I reviewed the results of the ER evaluation with the patient and he has wife understand and agree with the disposition and follow-up plan.  
Jaelyn Zelaya MD 3:27 PM

## 2020-03-06 NOTE — ED TRIAGE NOTES
Pt co sob  And palpitations over night, denies sob on arrival, pt states had hx of afib and had an ablation .

## 2020-03-06 NOTE — ED NOTES
Sharon Clayton is a 80 y.o. male that was discharged in good condition. The patients diagnosis, condition and treatment were explained to  patient and spouse and aftercare instructions were given. The patient and spouse verbalized understanding. Patient armband removed and shredded.

## 2020-05-29 NOTE — ED NOTES
Delano Lee is a 80 y.o. male that was discharged in stable condition. The patients diagnosis, condition and treatment were explained to  patient and aftercare instructions were given. The patient verbalized understanding. Patient armband removed and shredded.

## 2020-05-29 NOTE — ED TRIAGE NOTES
Patient was doing yard work 2 days ago and woke up with back pain yesterday. Wife states pain is worse today.

## 2020-05-29 NOTE — ED PROVIDER NOTES
EMERGENCY DEPARTMENT HISTORY AND PHYSICAL EXAM 
 
Date: 5/29/2020 Patient Name: Janine Yeh History of Presenting Illness Chief Complaint Patient presents with  Back Pain History Provided By: Patient and wife Chief Complaint: Fall, lower back pain Duration: Yesterday Timing: Gradually worsening Location: Right lower back without radiation Quality: Aching Severity: Moderate Modifying Factors: Worse after mechanical fall yesterday while gardening Associated Symptoms: none Additional History (Context): Janine Yeh is a 80 y.o. male with a history of A. fib and COPD who presents today for history as listed above. At the patient's request the wife is providing the majority of the history. Patient was outside yesterday working in the yard when he had a low impact mechanical fall that has resulted in some right lower back pain. Denies any radiation. Patient's neighbor told them that sometimes back pain can be the result of a UTI, wife is not concerned for this as well. Patient denies any fevers, chills, chest pain, shortness of breath, abdominal pain, nausea, vomiting, loss of bowel or bladder or head injury. PCP: Gamaliel Rogel MD 
 
Current Outpatient Medications Medication Sig Dispense Refill  cyclobenzaprine (FLEXERIL) 5 mg tablet Take 1 Tab by mouth three (3) times daily as needed for Muscle Spasm(s). 20 Tab 0  
 acetaminophen (TYLENOL) 325 mg tablet Take 2 Tabs by mouth every four (4) hours as needed for Pain. 20 Tab 0  
 levothyroxine (SYNTHROID) 25 mcg tablet Take 25 mcg by mouth.  simvastatin (ZOCOR) 20 mg tablet Take 20 mg by mouth.  SPIRIVA RESPIMAT 2.5 mcg/actuation inhaler  alendronate (FOSAMAX) 70 mg tablet  albuterol (PROVENTIL HFA, VENTOLIN HFA, PROAIR HFA) 90 mcg/actuation inhaler Take 2 Puffs by inhalation.     
 fluticasone propion-salmeterol (ADVAIR HFA) 115-21 mcg/actuation inhaler Take 2 Puffs by inhalation two (2) times a day.  furosemide (LASIX PO) Take  by mouth two (2) times a week.  multivitamin (ONE A DAY) tablet Take 1 Tab by mouth daily. Past History Past Medical History: 
Past Medical History:  
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 
Past Surgical History:  
Procedure Laterality Date  HX CORONARY ARTERY BYPASS GRAFT  3/12  HX HERNIA REPAIR    
 HX HIP REPLACEMENT  '60 & '06  
 x 2  
 HX OTHER SURGICAL  10/9/12 [facility] Family History: 
Family History Problem Relation Age of Onset  Cancer Mother  Emphysema Father Social History: 
Social History Tobacco Use  Smoking status: Former Smoker Last attempt to quit: 1990 Years since quittin.4  Smokeless tobacco: Never Used Substance Use Topics  Alcohol use: Yes Comment: social  
 Drug use: No  
 
 
Allergies: 
No Known Allergies Review of Systems Review of Systems Constitutional: Negative for chills and fever. HENT: Negative for congestion, rhinorrhea and sore throat. Respiratory: Negative for cough and shortness of breath. Cardiovascular: Negative for chest pain. Gastrointestinal: Negative for abdominal pain, blood in stool, constipation, diarrhea, nausea and vomiting. Genitourinary: Negative for dysuria, frequency and hematuria. Musculoskeletal: Positive for back pain. Negative for myalgias. Skin: Negative for rash and wound. Neurological: Negative for dizziness and headaches. All other systems reviewed and are negative. All Other Systems Negative Physical Exam  
 
Vitals:  
 05/29/20 1234 BP: 91/78 Pulse: 76 Resp: 20 SpO2: 100% Physical Exam 
Vitals signs and nursing note reviewed. Constitutional:   
   General: He is not in acute distress. Appearance: He is well-developed. He is not diaphoretic. HENT:  
   Head: Normocephalic and atraumatic. Eyes:  
   Conjunctiva/sclera: Conjunctivae normal.  
Neck: Musculoskeletal: Normal range of motion and neck supple. Cardiovascular:  
   Rate and Rhythm: Normal rate and regular rhythm. Heart sounds: Normal heart sounds. Pulmonary:  
   Effort: Pulmonary effort is normal. No respiratory distress. Breath sounds: Normal breath sounds. Chest:  
   Chest wall: No tenderness. Abdominal:  
   General: Bowel sounds are normal. There is no distension. Palpations: Abdomen is soft. Tenderness: There is no abdominal tenderness. There is no right CVA tenderness, left CVA tenderness, guarding or rebound. Musculoskeletal: Normal range of motion. General: No deformity. Thoracic back: Normal. He exhibits normal range of motion, no tenderness and no bony tenderness. Lumbar back: Normal. He exhibits normal range of motion, no tenderness and no bony tenderness. Comments: Ambulates with a cane, without difficulties Skin: 
   General: Skin is warm and dry. Neurological:  
   Mental Status: He is alert and oriented to person, place, and time. Diagnostic Study Results Labs -  No results found for this or any previous visit (from the past 12 hour(s)). Radiologic Studies - No orders to display CT Results  (Last 48 hours) None CXR Results  (Last 48 hours) None Medical Decision Making I am the first provider for this patient. I reviewed the vital signs, available nursing notes, past medical history, past surgical history, family history and social history. Vital Signs-Reviewed the patient's vital signs. Records Reviewed: Nursing Notes and Old Medical Records Procedures: None Procedures Provider Notes (Medical Decision Making):  
 
Differential Diagnosis: Musculoskeletal pain, myofascial strain/sprain, muscle spasm, spondylolisthesis, spondylosis, DJD, OA, sciatica, cauda equina syndrome, UTI Plan: During physical exam patient requesting albuterol treatment for shortness of breath. Patient does have COPD and does take daily medication for  this however denies taking his albuterol today. Will order DuoNeb while in the ED. Wife and patient deny any sort of shortness of breath work-up. Patient's physical exam is reassuring, patient is able to ambulate at room without difficulties. Did offer CT of lumbar spine however patient and wife both deny. Did order a UA however patient does not want to stay for this. Patient's wife requesting muscle relaxants, have agreed to discharge home with low-dose muscle relaxers however have discussed at length the risk for fall. Wife and patient state understanding. Will discharge home with PCP and Ortho follow-up. MED RECONCILIATION: 
No current facility-administered medications for this encounter. Current Outpatient Medications Medication Sig  cyclobenzaprine (FLEXERIL) 5 mg tablet Take 1 Tab by mouth three (3) times daily as needed for Muscle Spasm(s).  acetaminophen (TYLENOL) 325 mg tablet Take 2 Tabs by mouth every four (4) hours as needed for Pain.  levothyroxine (SYNTHROID) 25 mcg tablet Take 25 mcg by mouth.  simvastatin (ZOCOR) 20 mg tablet Take 20 mg by mouth.  SPIRIVA RESPIMAT 2.5 mcg/actuation inhaler  alendronate (FOSAMAX) 70 mg tablet  albuterol (PROVENTIL HFA, VENTOLIN HFA, PROAIR HFA) 90 mcg/actuation inhaler Take 2 Puffs by inhalation.  fluticasone propion-salmeterol (ADVAIR HFA) 115-21 mcg/actuation inhaler Take 2 Puffs by inhalation two (2) times a day.  furosemide (LASIX PO) Take  by mouth two (2) times a week.  multivitamin (ONE A DAY) tablet Take 1 Tab by mouth daily. Disposition: 
Home DISCHARGE NOTE:  
Pt has been reexamined. Patient has no new complaints, changes, or physical findings. Care plan outlined and precautions discussed. Results of workup were reviewed with the patient. All medications were reviewed with the patient. All of pt's questions and concerns were addressed. Patient was instructed and agrees to follow up with PCP/Ortho as well as to return to the ED upon further deterioration. Patient is ready to go home. Follow-up Information Follow up With Specialties Details Why Contact Info [address] [facility] EMERGENCY DEPT Emergency Medicine  As needed [address] [facility] [address] 
[phone] [address]   [address] [employer], [employer].  Schedule an appointment as soon as possible for a visit  [place] [facility], [address] [facility] 
[phone] Current Discharge Medication List  
  
START taking these medications Details  
cyclobenzaprine (FLEXERIL) 5 mg tablet Take 1 Tab by mouth three (3) times daily as needed for Muscle Spasm(s). Qty: 20 Tab, Refills: 0  
  
acetaminophen (TYLENOL) 325 mg tablet Take 2 Tabs by mouth every four (4) hours as needed for Pain. Qty: 20 Tab, Refills: 0 Diagnosis Clinical Impression: 1. Back strain, initial encounter \"Please note that this dictation was completed with BlogHer, the computer voice recognition software. Quite often unanticipated grammatical, syntax, homophones, and other interpretive errors are inadvertently transcribed by the computer software. Please disregard these errors. Please excuse any errors that have escaped final proofreading. \" I was personally available for consultation in the emergency department. I have not seen the patient but have reviewed the chart prior to discharge and agree with the documentation recorded by the [facility], including the assessment, treatment plan, and disposition.  
Barbara Dalal, DO

## 2020-05-29 NOTE — DISCHARGE INSTRUCTIONS
Patient Education        Learning About Relief for Back Pain  What is back strain? Back strain is an injury that happens when you overstretch, or pull, a muscle in your back. You may hurt your back in an accident or when you exercise or lift something. Most back pain gets better with rest and time. You can take care of yourself at home to help your back heal.  What can you do first to relieve back pain? When you first feel back pain, try these steps:  · Walk. Take a short walk (10 to 20 minutes) on a level surface (no slopes, hills, or stairs) every 2 to 3 hours. Walk only distances you can manage without pain, especially leg pain. · Relax. Find a comfortable position for rest. Some people are comfortable on the floor or a medium-firm bed with a small pillow under their head and another under their knees. Some people prefer to lie on their side with a pillow between their knees. Don't stay in one position for too long. · Try heat or ice. Try using a heating pad on a low or medium setting, or take a warm shower, for 15 to 20 minutes every 2 to 3 hours. Or you can buy single-use heat wraps that last up to 8 hours. You can also try an ice pack for 10 to 15 minutes every 2 to 3 hours. You can use an ice pack or a bag of frozen vegetables wrapped in a thin towel. There is not strong evidence that either heat or ice will help, but you can try them to see if they help. You may also want to try switching between heat and cold. · Take pain medicine exactly as directed. ? If the doctor gave you a prescription medicine for pain, take it as prescribed. ? If you are not taking a prescription pain medicine, ask your doctor if you can take an over-the-counter medicine. What else can you do? · Stretch and exercise. Exercises that increase flexibility may relieve your pain and make it easier for your muscles to keep your spine in a good, neutral position. And don't forget to keep walking. · Do self-massage.  You can use self-massage to unwind after work or school or to energize yourself in the morning. You can easily massage your feet, hands, or neck. Self-massage works best if you are in comfortable clothes and are sitting or lying in a comfortable position. Use oil or lotion to massage bare skin. · Reduce stress. Back pain can lead to a vicious [place]: Distress about the pain tenses the muscles in your back, which in turn causes more pain. Learn how to relax your mind and your muscles to lower your stress. Where can you learn more? Go to http://delmiGiftbarandres.info/  Enter Q517 in the search box to learn more about \"Learning About Relief for Back Pain. \"  Current as of: March 2, 2020               Content Version: 12.5  © 1770-8592 [employer], [employer]. Care instructions adapted under license by [employer] (which disclaims liability or warranty for this information). If you have questions about a medical condition or this instruction, always ask your healthcare professional. [address] any warranty or liability for your use of this information.

## 2020-05-29 NOTE — ED NOTES
Patient and spouse came out of room. Patient has been  Unable to give a urine specimen. He states he does not need to go. Patients wife is asking if he can just be discharged because he wants to leave. Provider discussed with patient and wife that we were waiting on urine specimen to check for urinary infection. They insisted they wanted to be discharged.

## 2020-06-12 PROBLEM — M48.061 SPINAL STENOSIS, LUMBAR: Status: ACTIVE | Noted: 2020-01-01

## 2020-06-12 PROBLEM — S32.030A CLOSED COMPRESSION FRACTURE OF L3 VERTEBRA (HCC): Status: ACTIVE | Noted: 2020-01-01

## 2020-06-12 NOTE — PROGRESS NOTES
[facility] Hospitalist Group Progress Note Patient: Caitlyn Beckman Age: 80 y.o. : 1929 MR#: [id number] SSN: [id number] Date/Time: 2020 Subjective:  
 
Review of systems Patient is confused and some behavioral issues According to his wife this is new Assessment/Plan:  
Patient with fall and L fracture 1. Acute on chronic L1 fracture from fall 2 Fall same level - tripped and fell 3 Steroid induced psychosis 4 Hypothyroid 5 Severe osteoporosis 6   HLD PLAN  
- Patient was seen by spinal surgery and recommended conservative management - PT consult placed - patient was started on high dose of steroids - patient developed severe psychosis - requiring sitter and restrains  Will be discontinued - pain management  
 
- trial of Zyprexa low dose D/w patient and patient's wife in room with him , all her questions were answered to her satisfaction Full CODE - confirmed with patient's wife Case discussed with:  [x]Patient  [x] Family/ wife  ( In room with patient )    [x]Nursing  []Case Management DVT Prophylaxis:  []Lovenox  [x]Hep SQ  []SCDs  []Coumadin   []On Heparin gtt Objective:  
VS:  
Visit Vitals /72 (BP 1 Location: Right arm, BP Patient Position: At rest) Pulse 81 Temp 97.3 °F (36.3 °C) Resp 18 SpO2 91% Tmax/24hrs: Temp (24hrs), Av.9 °F (36.6 °C), Min:97.3 °F (36.3 °C), Max:98.4 °F (36.9 °C) IOBRIEF Intake/Output Summary (Last 24 hours) at 2020 1455 Last data filed at 2020 1344 Gross per 24 hour Intake  Output 350 ml Net -350 ml General:  Alert, some non co operation and increasing confusion Cardiovascular: S1S2 - regular , No Murmur Pulmonary: Equal expansion , No Use of accessory muscles , No Rales No Rhonchi GI:  +BS in all four quadrants, soft, non-tender Extremities:  No edema; 2+ dorsalis pedis pulses bilaterally Neuro:alert and awake , moves extremities on his own ( limited exam due to mental status ) . Medications:  
Current Facility-Administered Medications Medication Dose Route Frequency  acetaminophen (TYLENOL) tablet 650 mg  650 mg Oral Q4H PRN  
 albuterol-ipratropium (DUO-NEB) 2.5 MG-0.5 MG/3 ML  3 mL Nebulization Q4H PRN  
 [START ON 6/18/2020] alendronate (FOSAMAX) tablet 70 mg  70 mg Oral every Thursday  [START ON 6/13/2020] levothyroxine (SYNTHROID) tablet 25 mcg  25 mcg Oral 6am  
 [START ON 6/13/2020] therapeutic multivitamin (THERAGRAN) tablet 1 Tab  1 Tab Oral DAILY  atorvastatin (LIPITOR) tablet 10 mg  10 mg Oral QHS  arformoterol 15 mcg/budesonide 0.5 mg neb solution   Nebulization BID  
 HYDROcodone-acetaminophen (NORCO) 5-325 mg per tablet 1 Tab  1 Tab Oral Q4H PRN  
 heparin (porcine) injection 5,000 Units  5,000 Units SubCUTAneous Q8H  
 ketorolac (TORADOL) injection 15 mg  15 mg IntraVENous Q6H PRN  
 ipratropium (ATROVENT) 0.02 % nebulizer solution 0.5 mg  0.5 mg Nebulization Q6H RT  
 OLANZapine (ZyPREXA) tablet 2.5 mg  2.5 mg Oral QPM  
 
 
Labs:   
Recent Labs  
  06/12/20 
0138 WBC 9.5 HGB 14.1 HCT 41.2  Recent Labs  
  06/12/20 
0138   
K 3.9  CO2 28  
GLU 98 BUN 28* CREA 1.10 CA 9.2 ALB 3.7 ALT 34 Disclaimer: Sections of this note are dictated utilizing voice recognition software, which may have resulted in some phonetic based errors in grammar and contents. Even though attempts were made to correct all the mistakes, some may have been missed, and remained in the body of the document. If questions arise, please contact our department. Signed By: Susan Coles MD   
 June 12, 2020

## 2020-06-12 NOTE — PROGRESS NOTES
Dr. Reena Hylton notified of patient's continued attempts to get out of bed despite consistent reorienting. Also notified that patient pulled his IV out. Sitter ordered. Notified nursing supervisor, Lovely Denver. Instructed to have one of our nursing aids be the current sitter for the patient.

## 2020-06-12 NOTE — PROGRESS NOTES
NUTRITION Nursing Referral: [facility] 
  
RECOMMENDATIONS / PLAN:  
 
- Modify supplement: change to Ensure Enlive BID 
- Update food preferences - Monitor/ encourage po intake of meals/supplements 
- Continue RD inpatient monitoring and evaluation. NUTRITION INTERVENTIONS & DIAGNOSIS:  
 
- Meals/snacks: modified composition - Medical food supplement therapy: modify Nutrition Diagnosis: Unintended weight loss related to predicted prolonged inadequate energy intake as evidenced by wt loss of 24 lb, 16.6% x 8 years PTA. ASSESSMENT:  
 
Pt with cognitive impairment and hearing difficulty; spoke with wife. Has good appetite and meal intake PTA, eats small meals. Preferences discussed; although, pt is not a picky eater per wife report. Tolerating regular consistency diet at home. Needs assistance with meal set up. Unsure of po intake at lunch time today; was NPO at breakfast today. Agreeable with nutrition supplement; will modify, change to Ensure Enlive to provide more energy and protein. Nutritional intake adequate to meet patients estimated nutritional needs:  Unable to determine at this time Diet: DIET REGULAR 
DIET NUTRITIONAL SUPPLEMENTS All Meals; ENSURE CLEAR Food Allergies: none known Current Appetite: Unknown Appetite/meal intake prior to admission: good; eats small meals; not a picky eater Feeding Limitations:  [] Swallowing difficulty    [] Chewing difficulty    [] Other: 
Current Meal Intake: No data found. BM: 6/12 Skin Integrity: no pressure injury or wound noted Edema:   [x] No     [] Yes Pertinent Medications: Reviewed: steroid Recent Labs  
  06/12/20 
0138   
K 3.9  CO2 28  
GLU 98 BUN 28* CREA 1.10 CA 9.2 ALB 3.7 ALT 34 Intake/Output Summary (Last 24 hours) at 6/12/2020 1740 Last data filed at 6/12/2020 1344 Gross per 24 hour Intake  Output 350 ml Net -350 ml Anthropometrics: 
Ht Readings from Last 1 Encounters: 06/12/20 5' 9\" (1.753 m) Last 3 Recorded Weights in this Encounter 06/12/20 1638 Weight: 53.5 kg (118 lb) Body mass index is 17.43 kg/m². Underweight Weight History:  Per wife report, pt has been losing weight over the past several years. Net wt loss of 24 lb, 16.6% x 8 years and 4 months PTA per chart hx 
 
Weight Metrics 6/12/2020 3/6/2020 8/16/2019 5/10/2019 4/18/2014 10/10/2013 4/8/2013 Weight 118 lb 121 lb 125 lb 123 lb 130 lb 132 lb 134 lb BMI 17.43 kg/m2 17.87 kg/m2 19.01 kg/m2 18.7 kg/m2 19.77 kg/m2 20.08 kg/m2 20.38 kg/m2 Admitting Diagnosis: Closed compression fracture of L3 vertebra ([facility] [address] [S32.030A] Spinal stenosis, lumbar [M48.061] Pertinent PMHx: heart disease, thyroid disease, malignant neoplasm of skin of face; CABG Education Needs:        [x] None identified  [] Identified - Not appropriate at this time  []  Identified and addressed - refer to education log Learning Limitations:   [] None identified  [x] Identified: bilateral [place], somecognitive impairment;  Early dementia per Orthopedic MD note on 6/12 Cultural, Alevism & ethnic food preferences:  [x] None identified    [] Identified and addressed ESTIMATED NUTRITION NEEDS:  
 
Calories: 0715-3592 kcal (30-35 kcal/kg) based on  [x] Actual BW: 54 kg      [] IBW Protein: 54-65 gm (1-1.2 gm/kg) based on  [x] Actual BW      [] IBW Fluid: 1 mL/kcal 
  
MONITORING & EVALUATION:  
 
Nutrition Goal(s):  
- PO nutrition intake will meet >75% of patient estimated nutritional needs within the next 7 days. Outcome: New/Initial goal  
 
Monitoring:   [x] Food and nutrient intake   [x] Food and nutrient administration  [x] Comparative standards   [x] Nutrition-focused physical findings   [x] Anthropometric Measurements   [x] Treatment/therapy   [x] Biochemical data, medical tests, and procedures Previous Recommendations (for follow-up assessments only):  Not Applicable Discharge Planning: No nutritional discharge needs at this time. Participated in care planning, discharge planning, & interdisciplinary rounds as appropriate. Alba Rhodes RD Pager: [phone]

## 2020-06-12 NOTE — H&P
Hospitalist Admission History and Physical 
 
NAME:  Malcolm Connelly :   1929 MRN:   [id number] PCP:  Cedrick Valentin MD 
Date/Time:  2020 8:08 AM 
Subjective: CHIEF COMPLAINT: Back pain impaired mobility after a fall HISTORY OF PRESENT ILLNESS:    
Simone Gambino is a 80 y.o.   male history of chronic back pain among other medical conditions who presents with stated complaints. Please note that patient is very hard of hearing and has some degree of cognitive impairment. Most of his history is obtained by talking to his wife and looking through his chart. Per wife, patient fell while doing yard work about 2 weeks ago. Initially he had significant pain but this had improved where he was able to mobilize using a walker or cane until 2 days ago when he had severe pain to the extent that he cannot get up out of bed. In between these. He was seen by [facility] core orthopedic physician for same complaint and the plan was for patient to get physical therapy a back brace and medications for pain control. He was not able to get the brace and has not been able to get PT OT as well. He has had some stool incontinence yesterday but patient denies any numbness in the inner thighs. He does complain of some numbness on the right hip region. No complaints of fevers chills cough shortness of breath. In the ED he was seen by the spinal surgeon with recommendations made. He has imaging of his back that shows lumbar level fracture. Per wife's description this is similar findings to the imaging he had when he saw the orthopedic physician in the recent past. 
 
 
 
Past Medical History:  
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  
Procedure Laterality Date  HX CORONARY ARTERY BYPASS GRAFT  3/12  HX HERNIA REPAIR    
 HX HIP REPLACEMENT  60 & '06  
 x 2  
 HX OTHER SURGICAL  10/9/12 [facility] Social History Tobacco Use  Smoking status: Former Smoker Last attempt to quit: 1990 Years since quittin.4  Smokeless tobacco: Never Used Substance Use Topics  Alcohol use: Yes Comment: social  
  
 
Family History Problem Relation Age of Onset  Cancer Mother  Emphysema Father No Known Allergies Prior to Admission Medications Prescriptions Last Dose Informant Patient Reported? Taking? SPIRIVA RESPIMAT 2.5 mcg/actuation inhaler   Yes No  
acetaminophen (TYLENOL) 325 mg tablet   No No  
Sig: Take 2 Tabs by mouth every four (4) hours as needed for Pain. albuterol (PROVENTIL HFA, VENTOLIN HFA, PROAIR HFA) 90 mcg/actuation inhaler   Yes No  
Sig: Take 2 Puffs by inhalation. alendronate (FOSAMAX) 70 mg tablet   Yes No  
cyclobenzaprine (FLEXERIL) 5 mg tablet   No No  
Sig: Take 1 Tab by mouth three (3) times daily as needed for Muscle Spasm(s). fluticasone propion-salmeterol (ADVAIR HFA) 115-21 mcg/actuation inhaler   Yes No  
Sig: Take 2 Puffs by inhalation two (2) times a day. furosemide (LASIX PO)   Yes No  
Sig: Take  by mouth two (2) times a week.   
levothyroxine (SYNTHROID) 25 mcg tablet   Yes No  
Sig: Take 25 mcg by mouth.  
multivitamin (ONE A DAY) tablet   Yes No  
 Sig: Take 1 Tab by mouth daily. simvastatin (ZOCOR) 20 mg tablet   Yes No  
Sig: Take 20 mg by mouth. Facility-Administered Medications: None REVIEW OF SYSTEMS:   
Please see above otw 10 point ROS checked and negative. Objective: VITALS:   
Visit Vitals /71 Pulse 64 Temp 98.2 °F (36.8 °C) Resp 23 SpO2 95% Temp (24hrs), Av.3 °F (36.8 °C), Min:98.2 °F (36.8 °C), Max:98.4 °F (36.9 °C) PHYSICAL EXAM:  
General:    Alert, cooperative, no distress, appears stated age. Head:   Normocephalic, without obvious abnormality, atraumatic. Eyes:   Conjunctivae clear, anicteric sclerae. Pupils are equal 
Nose:  Nares normal. No drainage. Throat:    Lips, mucosa, and tongue normal.  No Thrush Neck:  Supple, symmetrical,  no adenopathy,  
   and no JVD. Lungs:   Clear to auscultation bilaterally. No Wheezing or Rhonchi. No rales. Chest wall:  No tenderness or deformity. No Accessory muscle use. Heart:   Regular rate and rhythm,  no murmur, rub or gallop. Abdomen:   Soft, non-tender. Not distended. Bowel sounds normal. No masses Extremities: Extremities normal, atraumatic, No cyanosis. No edema. No clubbing Skin:     Texture, turgor normal. No rashes or lesions. Not Jaundiced Lymph nodes: Cervical, supraclavicular normal. 
Psych:  Good insight. Not depressed. Not anxious or agitated. Neurologic: EOMs intact. No facial asymmetry. No aphasia or slurred speech. Decreased strength of bilateral lower extremities due to pain  , Alert and oriented X 3. LAB DATA REVIEWED:   
No components found for: [place] Recent Labs  
  20 
0138   
K 3.9  CO2 28 BUN 28* CREA 1.10 GLU 98  
CA 9.2 ALB 3.7 WBC 9.5 HGB 14.1 HCT 41.2  IMAGING RESULTS: 
 []  I have personally reviewed the actual   []CXR  []CT scan CXR: 
CT abd pelv wo cont :IMPRESSION: 
  
1. No acute intra-abdominal or intrapelvic abnormality. 2.  Stable benign right lower lobe pulmonary nodule. 3.  Stable left hepatic lobe cyst. The other 2 hepatic lesions are not visible. 4.  Punctate calcification could be a gallstone in the neck of the gallbladder 
versus an adjacent vascular calcification. 5.  60% L3 compression fracture with retropulsion causing severe spinal canal 
stenosis. Assessment/Plan: Active Problems: 
  Spinal stenosis, lumbar (6/12/2020) Closed compression fracture of L3 vertebra ([address] (6/12/2020) -Mechanical fall 
-Impaired mobility HISTORY OF: 
-CAD  
-Atrial fibrillation currently does not appear to be on any type of anticoagulation -Hypothyroidism 
-Dyslipidemia on statin 
___________________________________________________ PLAN:   
 
-Admission for pain management, physical therapy. Also plan to give IV steroids given finding of severe spinal canal stenosis from compression fracture.   
-Plan to continue his outpatient regimen of medications including Synthroid -DVT prophylaxis -CODE STATUS discussed with both wife and patient. Patient opts for full CODE STATUS 
-Patient per wife has some degree of cognitive impairment and has tried to pull things off of him. She is requesting to be able to be present with him throughout his stay. I have conveyed to her the policy of no visitors due to the pandemic. Risk of deterioration:  []Low    [x]Moderate  []High Prophylaxis:  []Lovenox  []Coumadin  []Hep SQ  []SCDs  []H2B/PPI Disposition:  []Home w/ Family   []HH PT,OT,RN   [x]SNF/LTC   []SAH/Rehab Discussed Code Status:    [x]Full Code      []DNR    
___________________________________________________ Care Plan discussed with: 
  [x]Patient   [x]Family    []ED Care Manager  []ED Doc   []Specialist : 
 
Total Time Coordinating Admission:      minutes []Total Critical Care Time:    
___________________________________________________ Admitting Physician: Cleo Moreno MD

## 2020-06-12 NOTE — PROGRESS NOTES
Patient continues to be combative and confused. Patient physically trying to push the sitter out of the way to get out of bed. Roll belt applied for the patient's safety. Dr. Oxana Cochran notified and verbal order received for roll belt. 1337:  Wife, Mayra Joya, notified that a roll belt restraint has been applied for the patient's safety. Mayra Joya verbalized understanding.

## 2020-06-12 NOTE — PROGRESS NOTES
Notified Dr. Brett Antony of patient remaining extremely agitated and restless. New orders received for 1 mg ativan now and then q8h prn.

## 2020-06-12 NOTE — CONSULTS
Consult Patient: Isaias Bentley MRN: [id number]  SSN: [id number] YOB: 1929  Age: 80 y.o. Sex: male Subjective:  
  
Isaias Bentley is a 80 y.o. male who is being seen for 80 for a lumbar spine fracture. 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 He had approximately 2 weeks ago a fall which is some scrapes on his elbow but a couple days later noted some increasing back pain this is about 2 weeks ago now. He saw a local orthopedic surgeon Dr. Luly Anderson who diagnosed an L1 compression fracture and recommended a brace. He is noted a steady progression of some back pain and leg weakness with increasing inability to ambulate. Yesterday he developed some liquid stools. He is continent of urine he denies any numbness is difficult to assess while lying down his strength but he can do independent straight leg raising bilaterally with a normal neurologic exam essentially. He has had a previous hip replacement that retards hip motion. His radiographs demonstrate an L1 compression fracture of I believe various age. I believe there are acute and chronic components of it. 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 He has severe stenosis at L1 at the level of the fracture there is no instability. Discussing the matter at length with him and his wife it would appear he has some cognitive decline may be mild dementia he cannot recall his age he is difficult on place. He does not appear to be in any significant distress he defers to his wife to answer questions and that appears to be a difference for him again consistent with early dementia. The options as it stands for him would be decompressive surgery with fusion in a 80year-old with osteoporosis with an acute on chronic fracture this is not a low morbidity procedure with significant pain inherent.   Further complicated by the fact that he does appear to have some baseline dementia. The other option would be conservative care which would be progressive mobilization as tolerated acceptance of some possible degree of bowel or bladder dysfunction if it arose but it does not appear to be present currently with the knowledge that most thoracolumbar compression fractures such as his will heal and many if not the vast majority of patients have been neurologic recovery even if they have a minor deficit. I discussed the matter at length with him and his wife he deferred answers to his wife his wife would prefer a nonsurgical approach. At this point my recommendation would be admission as he cannot care for himself at home in the current situation I believe physical therapy could mobilize him bed to chair or bed to bedside commode. I have him on DVT prophylaxis. I would obtain this social work evaluation to see if services could be provided to them in their home to allow him mobilization from bed to chair as I would fear given his cognitive level that if he is admitted to an institution he would be disoriented and sundown. Generally fractures such as these take a 6 to 12-week period to stabilize. He may always have a diminished functional capacity in terms of ambulation given the nature of his injury and his age. I think that independent of any plan of intervention. I be happy to see him in follow-up as is needed. Marcio Dang Past Medical History:  
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  
Procedure Laterality Date  HX CORONARY ARTERY BYPASS GRAFT  3/12  HX HERNIA REPAIR    
 HX HIP REPLACEMENT  60 & '06  
 x 2  
 HX OTHER SURGICAL  10/9/12 [facility] Family History Problem Relation Age of Onset  Cancer Mother  Emphysema Father Social History Tobacco Use  Smoking status: Former Smoker Last attempt to quit: 1990 Years since quittin.4  Smokeless tobacco: Never Used Substance Use Topics  Alcohol use: Yes Comment: social  
  
Current Facility-Administered Medications Medication Dose Route Frequency Provider Last Rate Last Dose  methylPREDNISolone (PF) (Solu-MEDROL) injection 500 mg  500 mg IntraVENous ONCE Tye Antony MD      
 
Current Outpatient Medications Medication Sig Dispense Refill  cyclobenzaprine (FLEXERIL) 5 mg tablet Take 1 Tab by mouth three (3) times daily as needed for Muscle Spasm(s). 20 Tab 0  
 acetaminophen (TYLENOL) 325 mg tablet Take 2 Tabs by mouth every four (4) hours as needed for Pain. 20 Tab 0  
 levothyroxine (SYNTHROID) 25 mcg tablet Take 25 mcg by mouth.  simvastatin (ZOCOR) 20 mg tablet Take 20 mg by mouth.  SPIRIVA RESPIMAT 2.5 mcg/actuation inhaler  alendronate (FOSAMAX) 70 mg tablet  albuterol (PROVENTIL HFA, VENTOLIN HFA, PROAIR HFA) 90 mcg/actuation inhaler Take 2 Puffs by inhalation.  fluticasone propion-salmeterol (ADVAIR HFA) 115-21 mcg/actuation inhaler Take 2 Puffs by inhalation two (2) times a day.  furosemide (LASIX PO) Take  by mouth two (2) times a week.  multivitamin (ONE A DAY) tablet Take 1 Tab by mouth daily. No Known Allergies Review of Systems: A comprehensive review of systems was negative except for that written in the History of Present Illness. Objective:  
 
Vitals:  
 06/12/20 0134 BP: 151/76 Pulse: 65 Resp: 21 Temp: 98.4 °F (36.9 °C) SpO2: 98% Physical Exam: 
General:  Alert, cooperative, no distress, appears stated age. Eyes:  Conjunctivae/corneas clear. PERRL, EOMs intact. Fundi benign Ears:  Normal TMs and external ear canals both ears. Nose: Nares normal. Septum midline. Mucosa normal. No drainage or sinus tenderness. Mouth/Throat: Lips, mucosa, and tongue normal. Teeth and gums normal.  
Neck: Supple, symmetrical, trachea midline, no adenopathy, thyroid: no enlargment/tenderness/nodules, no carotid bruit and no JVD. Back:   Symmetric, no curvature. ROM normal. No CVA tenderness. Lungs:   Clear to auscultation bilaterally. Heart:  Regular rate and rhythm, S1, S2 normal, no murmur, click, rub or gallop. Abdomen:   Soft, non-tender. Bowel sounds normal. No masses,  No organomegaly. Extremities: Extremities normal, atraumatic, no cyanosis or edema. Pulses: 2+ and symmetric all extremities. Skin: Skin color, texture, turgor normal. No rashes or lesions Lymph nodes: Cervical, supraclavicular, and axillary nodes normal.  
Neurologic: CNII-XII intact. Normal strength, sensation and reflexes throughout. Ct scan with l1 fracture severe stenosis Assessment:  
 
 
Acute on chronic L1 compression/burst fracture with inability to ambulate without pain. No clear cauda equina syndrome though it is a concern. Patient appears to have mild dementia though is a retired naval officer/aviator. Plan:  
 
Plan would be admit progressive mobilization physical therapy. Bed to chair. Bed to bedside commode. Social work evaluation for home services to see if he can be managed on a home setting. At last resort consider consideration of a rehab center/ SNF. No brace needed. Generally a 6 to 12-week period of healing. DVT prophylaxis Signed By: Sandra Marcum MD   
 June 12, 2020

## 2020-06-12 NOTE — ED NOTES
TRANSFER - OUT REPORT: 
 
Verbal report given to Ronda(name) on Kwan F Weeks  being transferred to [address] [place] [address](unit) for routine progression of care Report consisted of patients Situation, Background, Assessment and  
Recommendations(SBAR). Information from the following report(s) SBAR, Kardex, ED Summary, Intake/Output, MAR, Recent Results, Alarm Parameters  and Quality Measures was reviewed with the receiving nurse. Lines:  
Peripheral IV 06/12/20 Right Forearm (Active) Site Assessment Clean, dry, & intact 6/12/2020  2:03 AM  
Phlebitis Assessment 0 6/12/2020  2:03 AM  
Infiltration Assessment 0 6/12/2020  2:03 AM  
Dressing Status Clean, dry, & intact 6/12/2020  2:03 AM  
Dressing Type Transparent 6/12/2020  2:03 AM  
  
 
Opportunity for questions and clarification was provided. Patient transported with: 
 Tech 
transport

## 2020-06-12 NOTE — ED NOTES
Patient for admission to the hospital patient with periods of confusion and has had very small amounts of honey consistence stool noted not enough to send for a sample. All due meds given seen and evaluated by Dr Kenyon Hidalgo. Spouse at bedside due to patient's confusion.

## 2020-06-12 NOTE — PROGRESS NOTES
Not able to assess the patient due to medical condition. Nicole Key Board Certified [employer] [facility] 
[phone]

## 2020-06-12 NOTE — PROGRESS NOTES
Dr. Stiven Novak is aware that patient currently has no IV access and is ok with him remaining without IV access.

## 2020-06-12 NOTE — PROGRESS NOTES
Per conversation with wife, referrals sent to selected SNF facilities in the area via Narciso Rollins and Joseph Carreon. Lila Boyle RN - Outcomes Manager  [phone]

## 2020-06-12 NOTE — PROGRESS NOTES
Reason for Admission:  Closed compression fracture of L3 vertebra ([facility] [address] [R66.688R] Spinal stenosis, lumbar [M48.061] RRAT Score:    8% Plan for utilizing home health:    TBD, [address] signed with verbal consent for [facility] health and local SNFs Likelihood of Readmission:   LOW Transition of Care Plan:         
 
 
Initial assessment completed with spouse/SO. Cognitive status of patient: disoriented. Face sheet information confirmed:  yes. The patient designates spouse, Joe Craig to participate in his discharge plan and to receive any needed information. This patient lives in a single family home with spouse. Patient is not able to navigate steps as needed. Prior to hospitalization, patient was considered to be independent with ADLs/IADLS : yes . Patient has a current ACP document on file: no The spouse will be available to transport patient home upon discharge. The patient already has Ethelyn Parody chair, and raised toilet seat available in the home. Patient is not currently active with home health. Patient has not stayed in a skilled nursing facility or rehab. This patient is on dialysis :no 
 
List of available SNF agencies were provided and reviewed with the patient prior to discharge. Freedom of choice signed: yes, for [facility] and local SNFs. Currently, the discharge plan is TBD. The patient states that he can obtain his medications from the pharmacy, and take his medications as directed. Patient's current insurance is Medicare and  Pts wife states he is normally very active; up until a couple years ago, they would go hiking in [place]. They have a long term care policy with a 90 day waiting period. Care Management Interventions PCP Verified by CM: Yes Mode of Transport at Discharge: BLS Transition of Care Consult (CM Consult): Discharge Planning, SNF, Home Health [address]: Yes Current Support Network: Lives with Spouse Confirm Follow Up Transport: Family The Patient and/or Patient Representative was Provided with a Choice of Provider and Agrees with the Discharge Plan?: Yes Name of the Patient Representative Who was Provided with a Choice of Provider and Agrees with the Discharge Plan: verbal given via phone by [facility] [place] of Choice List was Provided with Basic Dialogue that Supports the Patient's Individualized Plan of Care/Goals, Treatment Preferences and Shares the Quality Data Associated with the Providers?: Yes Discharge Location Discharge Placement: Unable to determine at this time Mitzi Sharma RN - Outcomes Manager  [phone]

## 2020-06-12 NOTE — ED NOTES
Patient's spouse on unit reports that the patient had a fall a few weeks ago and was being treated with pain medication for compression fractures to his lower back. Today she reports that he has had diarrhea all day and is weak spent most of the day in bed and is having a harder time getting around with his walker. She is most concerned about his inability to ambulate and by the amount of fluid he lost today with his loose bowel movements.

## 2020-06-12 NOTE — PROGRESS NOTES
Problem: Risk for Spread of Infection Goal: Prevent transmission of infectious organism to others Description: Prevent the transmission of infectious organisms to other patients, staff members, and visitors. Outcome: Progressing Towards Goal 
  
Problem: Patient Education:  Go to Education Activity Goal: Patient/Family Education Outcome: Progressing Towards Goal 
  
Problem: Pressure Injury - Risk of 
Goal: *Prevention of pressure injury Description: Document Anthony Scale and appropriate interventions in the flowsheet. Outcome: Progressing Towards Goal 
Note: Pressure Injury Interventions: 
Sensory Interventions: Keep linens dry and wrinkle-free, Minimize linen layers Moisture Interventions: Absorbent underpads, Minimize layers Activity Interventions: Pressure redistribution bed/mattress(bed type), Assess need for specialty bed Mobility Interventions: Pressure redistribution bed/mattress (bed type), PT/OT evaluation Nutrition Interventions: Document food/fluid/supplement intake, Offer support with meals,snacks and hydration Friction and Shear Interventions: Apply protective barrier, creams and emollients, Lift team/patient mobility team, Minimize layers Problem: Patient Education: Go to Patient Education Activity Goal: Patient/Family Education Outcome: Progressing Towards Goal 
  
Problem: Falls - Risk of 
Goal: *Absence of Falls Description: Document Ball Reason Fall Risk and appropriate interventions in the flowsheet. Outcome: Progressing Towards Goal 
Note: Fall Risk Interventions: 
  
 
Mentation Interventions: Adequate sleep, hydration, pain control, Bed/chair exit alarm, Door open when patient unattended, Family/sitter at bedside, More frequent rounding, Reorient patient, Room close to nurse's station, Update white board Medication Interventions: Assess postural VS orthostatic hypotension, Patient to call before getting OOB, Teach patient to arise slowly Elimination Interventions: Call light in reach, Patient to call for help with toileting needs History of Falls Interventions: Bed/chair exit alarm, Door open when patient unattended, Room close to nurse's station, Vital signs minimum Q4HRs X 24 hrs (comment for end date) Problem: Patient Education: Go to Patient Education Activity Goal: Patient/Family Education Outcome: Progressing Towards Goal 
  
Problem: Non-Violent Restraints Goal: *Removal from restraints as soon as assessed to be safe Outcome: Progressing Towards Goal 
Goal: *No harm/injury to patient while restraints in use Outcome: Progressing Towards Goal 
Goal: *Patient's dignity will be maintained Outcome: Progressing Towards Goal 
Goal: *Patient Specific Goal (EDIT GOAL, INSERT TEXT) Outcome: Progressing Towards Goal 
Goal: Non-violent Restaints:Standard Interventions Outcome: Progressing Towards Goal 
Goal: Non-violent Restraints:Patient Interventions Outcome: Progressing Towards Goal 
Goal: Patient/Family Education Outcome: Progressing Towards Goal

## 2020-06-13 NOTE — PROGRESS NOTES
Problem: Risk for Spread of Infection Goal: Prevent transmission of infectious organism to others Description: Prevent the transmission of infectious organisms to other patients, staff members, and visitors. Outcome: Not Progressing Towards Goal 
  
Problem: Patient Education:  Go to Education Activity Goal: Patient/Family Education Outcome: Not Progressing Towards Goal 
  
Problem: Pressure Injury - Risk of 
Goal: *Prevention of pressure injury Description: Document Anthony Scale and appropriate interventions in the flowsheet. Outcome: Not Progressing Towards Goal 
Note: Pressure Injury Interventions: 
Sensory Interventions: Assess need for specialty bed, Discuss PT/OT consult with provider, Keep linens dry and wrinkle-free Moisture Interventions: Check for incontinence Q2 hours and as needed, Minimize layers Activity Interventions: PT/OT evaluation, Increase time out of bed Mobility Interventions: PT/OT evaluation, Turn and reposition approx. every two hours(pillow and wedges) Nutrition Interventions: Document food/fluid/supplement intake Friction and Shear Interventions: Foam dressings/transparent film/skin sealants, Minimize layers Problem: Patient Education: Go to Patient Education Activity Goal: Patient/Family Education Outcome: Not Progressing Towards Goal 
  
Problem: Falls - Risk of 
Goal: *Absence of Falls Description: Document Chichi Maureen Fall Risk and appropriate interventions in the flowsheet. Outcome: Not Progressing Towards Goal 
Note: Fall Risk Interventions: 
Mobility Interventions: Patient to call before getting OOB, PT Consult for mobility concerns Mentation Interventions: Bed/chair exit alarm, Door open when patient unattended, Family/sitter at bedside, Reorient patient, More frequent rounding Medication Interventions: Patient to call before getting OOB, Teach patient to arise slowly Elimination Interventions: Call light in reach, Patient to call for help with toileting needs, Urinal in reach History of Falls Interventions: Bed/chair exit alarm, Door open when patient unattended, Investigate reason for fall, Room close to nurse's station Problem: Patient Education: Go to Patient Education Activity Goal: Patient/Family Education Outcome: Not Progressing Towards Goal 
  
Problem: Non-Violent Restraints Goal: *Removal from restraints as soon as assessed to be safe Outcome: Not Progressing Towards Goal 
Goal: *No harm/injury to patient while restraints in use Outcome: Not Progressing Towards Goal 
Goal: *Patient's dignity will be maintained Outcome: Not Progressing Towards Goal 
Goal: *Patient Specific Goal (EDIT GOAL, INSERT TEXT) Outcome: Not Progressing Towards Goal 
Goal: Non-violent Restaints:Standard Interventions Outcome: Not Progressing Towards Goal 
Goal: Non-violent Restraints:Patient Interventions Outcome: Not Progressing Towards Goal 
Goal: Patient/Family Education Outcome: Not Progressing Towards Goal 
  
Problem: Nutrition Deficit Goal: *Optimize nutritional status Outcome: Not Progressing Towards Goal 
  
Problem: Pain Goal: *Control of Pain Outcome: Not Progressing Towards Goal 
Goal: *PALLIATIVE CARE:  Alleviation of Pain Outcome: Not Progressing Towards Goal 
  
Problem: Patient Education: Go to Patient Education Activity Goal: Patient/Family Education Outcome: Not Progressing Towards Goal

## 2020-06-13 NOTE — PROGRESS NOTES
1525: Wife at patients bedside, Dr. Jyoti Easley called an notifed that pt wife concerned about continuing loose bowel movements. Wife also concerned about giving heparin subcutaneously when patient has a lot of bruising. Order received to give heparin as ordered. 1535: Dr. Jyoti Easley phone called sent to patients room. Wife speaking with Doctor. 1600: Pt bladder scanned for >200ml of urine, pt complains of bladder pain. Dr. Hubert Brandt notified and telephone order for murcia catheter received. 1640: Murcia catheter placed. Pt tolerated procedure fairly. 500ml of Lian urine noted out of murcia catheter within first 10 minutes.

## 2020-06-13 NOTE — PROGRESS NOTES
[facility] Patient: Gilda Flanagan Age: 80 y.o. : 1929 MR#: [id number] SSN: [id number] Date/Time: 2020 Subjective:  
 
Review of systems Patient still has some confusion Restraints are off but we are ordering sitter in the room for close observation Patient's wife in the room with patient Per nursing :  
Patient has watery diarrhea 
 she is unable to send diarrhea sample for C diff No distress No NVD No cough Assessment/Plan:  
Patient with fall and L fracture 1. Acute on chronic L1 fracture from fall 2 Fall same level - tripped and fell 3 Steroid induced psychosis 4 Hypothyroid 5 Severe osteoporosis 6   HLD PLAN  
 
- D/w nurse taking care of patient -advised her to send the sample as soon as possible positive, meanwhile order Imodium and metronidazole empirically, monitor glucose. According to patient's wife patient has not taken any antibiotics for last 6 months. Very unlikely to be C. difficile diarrhea. - Patient was seen by spinal surgery and recommended conservative management - PT  
 
- patient was started on high dose of steroids - patient developed severe psychosis - requiring sitter and restrains  Will be discontinued - pain management  
 
- trial of Zyprexa low dose D/w patient and patient's wife in room with him , all her questions were answered to her satisfaction Full CODE - confirmed with patient's wife Case discussed with:  [x]Patient  [x] Family/ wife  ( In room with patient )    [x]Nursing  []Case Management DVT Prophylaxis:  []Lovenox  [x]Hep SQ  []SCDs  []Coumadin   []On Heparin gtt Objective:  
VS:  
Visit Vitals /72 Pulse 71 Temp 99 °F (37.2 °C) Resp 18 Ht 5' 9\" (1.753 m) Wt 53.5 kg (118 lb) SpO2 95% BMI 17.43 kg/m² Tmax/24hrs: Temp (24hrs), Av.8 °F (36.6 °C), Min:96.8 °F (36 °C), Max:99 °F (37.2 °C) IOBRIEF No intake or output data in the 24 hours ending 06/13/20 1537 General:  Alert, some non co operation and increasing confusion Cardiovascular: S1S2 - regular , No Murmur Pulmonary: Equal expansion , No Use of accessory muscles , No Rales No Rhonchi GI:  +BS in all four quadrants, soft, non-tender Extremities:  No edema; 2+ dorsalis pedis pulses bilaterally Neuro:alert and awake , moves extremities on his own ( limited exam due to mental status ) . Medications:  
Current Facility-Administered Medications Medication Dose Route Frequency  arformoterol 15 mcg/budesonide 0.5 mg neb solution   Nebulization BID RT  
 loperamide (IMODIUM) capsule 2 mg  2 mg Oral Q4H PRN  
 metroNIDAZOLE (FLAGYL) tablet 500 mg  500 mg Oral TID  acetaminophen (TYLENOL) tablet 650 mg  650 mg Oral Q4H PRN  
 albuterol-ipratropium (DUO-NEB) 2.5 MG-0.5 MG/3 ML  3 mL Nebulization Q4H PRN  
 [START ON 6/18/2020] alendronate (FOSAMAX) tablet 70 mg  70 mg Oral every Thursday  levothyroxine (SYNTHROID) tablet 25 mcg  25 mcg Oral 6am  
 therapeutic multivitamin (THERAGRAN) tablet 1 Tab  1 Tab Oral DAILY  atorvastatin (LIPITOR) tablet 10 mg  10 mg Oral QHS  
 HYDROcodone-acetaminophen (NORCO) 5-325 mg per tablet 1 Tab  1 Tab Oral Q4H PRN  
 heparin (porcine) injection 5,000 Units  5,000 Units SubCUTAneous Q8H  
 ketorolac (TORADOL) injection 15 mg  15 mg IntraVENous Q6H PRN  
 ipratropium (ATROVENT) 0.02 % nebulizer solution 0.5 mg  0.5 mg Nebulization Q6H RT  
 OLANZapine (ZyPREXA) tablet 2.5 mg  2.5 mg Oral QPM  
 LORazepam (ATIVAN) tablet 1 mg  1 mg Oral Q8H PRN Labs:   
Recent Labs  
  06/12/20 
0138 WBC 9.5 HGB 14.1 HCT 41.2  Recent Labs  
  06/12/20 
0138   
K 3.9  CO2 28  
GLU 98 BUN 28* CREA 1.10 CA 9.2 ALB 3.7 ALT 34 Disclaimer: Sections of this note are dictated utilizing voice recognition software, which may have resulted in some phonetic based errors in grammar and contents. Even though attempts were made to correct all the mistakes, some may have been missed, and remained in the body of the document. If questions arise, please contact our department. Signed By: Willian Brunner, MD   
 June 13, 2020

## 2020-06-13 NOTE — PROGRESS NOTES
Bedside shift change report given to Suzie Pascual (oncoming nurse) by Luis M Torres (offgoing nurse). Report included the following information SBAR, Kardex, Procedure Summary, Intake/Output and MAR.

## 2020-06-13 NOTE — PROGRESS NOTES
Problem: Risk for Spread of Infection Goal: Prevent transmission of infectious organism to others Description: Prevent the transmission of infectious organisms to other patients, staff members, and visitors. Outcome: Progressing Towards Goal 
  
Problem: Patient Education:  Go to Education Activity Goal: Patient/Family Education Outcome: Progressing Towards Goal 
  
Problem: Pressure Injury - Risk of 
Goal: *Prevention of pressure injury Description: Document Anthony Scale and appropriate interventions in the flowsheet. Outcome: Progressing Towards Goal 
Note: Pressure Injury Interventions: 
Sensory Interventions: Assess changes in LOC, Avoid rigorous massage over bony prominences, Check visual cues for pain, Discuss PT/OT consult with provider, Keep linens dry and wrinkle-free, Maintain/enhance activity level, Minimize linen layers, Monitor skin under medical devices, Pad between skin to skin, Pressure redistribution bed/mattress (bed type) Moisture Interventions: Absorbent underpads, Apply protective barrier, creams and emollients, Assess need for specialty bed, Check for incontinence Q2 hours and as needed, Limit adult briefs, Maintain skin hydration (lotion/cream), Minimize layers, Moisture barrier, Offer toileting Q_hr Activity Interventions: Assess need for specialty bed, Chair cushion, Increase time out of bed, Pressure redistribution bed/mattress(bed type), PT/OT evaluation Mobility Interventions: Assess need for specialty bed, Chair cushion, HOB 30 degrees or less, Pressure redistribution bed/mattress (bed type), PT/OT evaluation Nutrition Interventions: Document food/fluid/supplement intake Friction and Shear Interventions: Apply protective barrier, creams and emollients, Foam dressings/transparent film/skin sealants, HOB 30 degrees or less, Lift sheet, Lift team/patient mobility team, Minimize layers, Sit at 90-degree angle, Transferring/repositioning devices Problem: Patient Education: Go to Patient Education Activity Goal: Patient/Family Education Outcome: Progressing Towards Goal 
  
Problem: Falls - Risk of 
Goal: *Absence of Falls Description: Document Salas Menendez Fall Risk and appropriate interventions in the flowsheet. Outcome: Progressing Towards Goal 
Note: Fall Risk Interventions: 
Mobility Interventions: Assess mobility with egress test, Bed/chair exit alarm, Communicate number of staff needed for ambulation/transfer, OT consult for ADLs, Patient to call before getting OOB, PT Consult for mobility concerns, PT Consult for assist device competence, Strengthening exercises (ROM-active/passive), Utilize walker, cane, or other assistive device Mentation Interventions: Adequate sleep, hydration, pain control, Bed/chair exit alarm, Door open when patient unattended, Evaluate medications/consider consulting pharmacy, Familiar objects from home, Family/sitter at bedside, More frequent rounding, Reorient patient, Room close to nurse's station, Self-releasing belt, Toileting rounds, Update white board Medication Interventions: Assess postural VS orthostatic hypotension, Bed/chair exit alarm, Evaluate medications/consider consulting pharmacy, Patient to call before getting OOB, Teach patient to arise slowly Elimination Interventions: Bed/chair exit alarm, Call light in reach, Patient to call for help with toileting needs, Stay With Me (per policy), Toilet paper/wipes in reach, Toileting schedule/hourly rounds, Urinal in reach History of Falls Interventions: Bed/chair exit alarm, Consult care management for discharge planning, Door open when patient unattended, Evaluate medications/consider consulting pharmacy, Investigate reason for fall, Room close to nurse's station, Assess for delayed presentation/identification of injury for 48 hrs (comment for end date), Vital signs minimum Q4HRs X 24 hrs (comment for end date) Problem: Patient Education: Go to Patient Education Activity Goal: Patient/Family Education Outcome: Progressing Towards Goal 
  
Problem: Non-Violent Restraints Goal: *Removal from restraints as soon as assessed to be safe Outcome: Progressing Towards Goal 
Goal: *No harm/injury to patient while restraints in use Outcome: Progressing Towards Goal 
Goal: *Patient's dignity will be maintained Outcome: Progressing Towards Goal 
Goal: *Patient Specific Goal (EDIT GOAL, INSERT TEXT) Outcome: Progressing Towards Goal 
Goal: Non-violent Restaints:Standard Interventions Outcome: Progressing Towards Goal 
Goal: Non-violent Restraints:Patient Interventions Outcome: Progressing Towards Goal 
Goal: Patient/Family Education Outcome: Progressing Towards Goal 
  
Problem: Nutrition Deficit Goal: *Optimize nutritional status Outcome: Progressing Towards Goal 
  
Problem: Pain Goal: *Control of Pain Outcome: Progressing Towards Goal 
Goal: *PALLIATIVE CARE:  Alleviation of Pain Outcome: Progressing Towards Goal 
  
Problem: Patient Education: Go to Patient Education Activity Goal: Patient/Family Education Outcome: Progressing Towards Goal

## 2020-06-14 NOTE — PROGRESS NOTES
Bedside shift change report given to Nayana Borrego (oncoming nurse) by Michel Rubio (offgoing nurse). Report included the following information SBAR, Kardex, Procedure Summary, Intake/Output and MAR.

## 2020-06-14 NOTE — PROGRESS NOTES
[facility] Hospitalist Group Progress Note Patient: Karli Home Age: 80 y.o. : 1929 MR#: [id number] SSN: [id number] Date/Time: 2020 Subjective:  
 
Bleeding from skin tear left arm Diarrhea less Sleepy Still confused when wakes up Assessment/Plan:  
Patient with fall and L fracture 1. Acute on chronic L1 fracture from fall 2 Fall same level - tripped and fell 3 Steroid induced psychosis 4 Hypothyroid 5 Severe osteoporosis 6   HLD  
 
7 Urinary retention - S/p Nuñez cath with 500 ml urine out put - add flomax / urology consult in AM  
 
8 Diarrhea - C dif PENDING  
 
PLAN  
 
- Local wound care  
- Hold heparin due to bleed and multiple chronic  bruising - Vit K / Follow PT /INR  
- On Empiric metronidazole Full CODE - confirmed with patient's wife Case discussed with:  [x]Patient  [x] Family/ wife  ( In room with patient )    [x]Nursing  []Case Management DVT Prophylaxis:  []Lovenox  [x]Hep SQ  []SCDs  []Coumadin   []On Heparin gtt Objective:  
VS:  
Visit Vitals /79 (BP 1 Location: Right arm, BP Patient Position: At rest) Pulse 79 Temp 97.1 °F (36.2 °C) Resp 16 Ht 5' 9\" (1.753 m) Wt 53.5 kg (118 lb) SpO2 95% BMI 17.43 kg/m² Tmax/24hrs: Temp (24hrs), Av.1 °F (37.3 °C), Min:97.1 °F (36.2 °C), Max:100.3 °F (37.9 °C) IOBRIEF Intake/Output Summary (Last 24 hours) at 2020 1049 Last data filed at 2020 7645 Gross per 24 hour Intake 200 ml Output 925 ml Net -725 ml General: sleepy / calm when sleepy but tries to get up when wakes up Cardiovascular: S1S2 - regular , No Murmur Pulmonary: Equal expansion , No Use of accessory muscles , No Rales No Rhonchi GI:  +BS in all four quadrants, soft, non-tender Extremities:  No edema; 2+ dorsalis pedis pulses bilaterally Neuro:alert and awake , moves extremities on his own ( limited exam due to mental status ) . Medications:  
Current Facility-Administered Medications Medication Dose Route Frequency  phytonadione (vitamin K1) (MEPHYTON) tablet 5 mg  5 mg Oral NOW  arformoterol 15 mcg/budesonide 0.5 mg neb solution   Nebulization BID RT  
 loperamide (IMODIUM) capsule 2 mg  2 mg Oral Q4H PRN  
 metroNIDAZOLE (FLAGYL) tablet 500 mg  500 mg Oral TID  acetaminophen (TYLENOL) tablet 650 mg  650 mg Oral Q4H PRN  
 albuterol-ipratropium (DUO-NEB) 2.5 MG-0.5 MG/3 ML  3 mL Nebulization Q4H PRN  
 [START ON 6/18/2020] alendronate (FOSAMAX) tablet 70 mg  70 mg Oral every Thursday  levothyroxine (SYNTHROID) tablet 25 mcg  25 mcg Oral 6am  
 therapeutic multivitamin (THERAGRAN) tablet 1 Tab  1 Tab Oral DAILY  atorvastatin (LIPITOR) tablet 10 mg  10 mg Oral QHS  
 HYDROcodone-acetaminophen (NORCO) 5-325 mg per tablet 1 Tab  1 Tab Oral Q4H PRN  
 ketorolac (TORADOL) injection 15 mg  15 mg IntraVENous Q6H PRN  
 ipratropium (ATROVENT) 0.02 % nebulizer solution 0.5 mg  0.5 mg Nebulization Q6H RT  
 OLANZapine (ZyPREXA) tablet 2.5 mg  2.5 mg Oral QPM  
 LORazepam (ATIVAN) tablet 1 mg  1 mg Oral Q8H PRN Labs:   
Recent Labs  
  06/12/20 
0138 WBC 9.5 HGB 14.1 HCT 41.2  Recent Labs  
  06/12/20 
0138   
K 3.9  CO2 28  
GLU 98 BUN 28* CREA 1.10 CA 9.2 ALB 3.7 ALT 34 Disclaimer: Sections of this note are dictated utilizing voice recognition software, which may have resulted in some phonetic based errors in grammar and contents. Even though attempts were made to correct all the mistakes, some may have been missed, and remained in the body of the document. If questions arise, please contact our department. Signed By: Sydni Ruiz MD   
 June 14, 2020

## 2020-06-14 NOTE — PROGRESS NOTES
Problem: Risk for Spread of Infection Goal: Prevent transmission of infectious organism to others Description: Prevent the transmission of infectious organisms to other patients, staff members, and visitors. Outcome: Progressing Towards Goal 
  
Problem: Patient Education:  Go to Education Activity Goal: Patient/Family Education Outcome: Progressing Towards Goal 
  
Problem: Pressure Injury - Risk of 
Goal: *Prevention of pressure injury Description: Document Anthony Scale and appropriate interventions in the flowsheet. Outcome: Progressing Towards Goal 
Note: Pressure Injury Interventions: 
Sensory Interventions: Discuss PT/OT consult with provider, Keep linens dry and wrinkle-free, Minimize linen layers Moisture Interventions: Check for incontinence Q2 hours and as needed, Internal/External urinary devices, Maintain skin hydration (lotion/cream), Minimize layers Activity Interventions: Increase time out of bed, PT/OT evaluation Mobility Interventions: PT/OT evaluation Nutrition Interventions: Offer support with meals,snacks and hydration Friction and Shear Interventions: Lift team/patient mobility team, Minimize layers, Apply protective barrier, creams and emollients Problem: Patient Education: Go to Patient Education Activity Goal: Patient/Family Education Outcome: Progressing Towards Goal 
  
Problem: Patient Education: Go to Patient Education Activity Goal: Patient/Family Education Outcome: Progressing Towards Goal 
  
Problem: Pain Goal: *Control of Pain Outcome: Progressing Towards Goal 
  
Problem: Patient Education: Go to Patient Education Activity Goal: Patient/Family Education Outcome: Progressing Towards Goal

## 2020-06-14 NOTE — PROGRESS NOTES
PT eval order received and chart reviewed. Unable to see patient at this time as pt is sleeping heavily and soundly, with sitter at bedside, minimally arouses to sternal rub and voice, sitter states he just fell back asleep. Will follow up as patient schedule allows. Thank you for this referral. Joslyn Hewitt, PT, DPT.

## 2020-06-14 NOTE — PROGRESS NOTES
Problem: Risk for Spread of Infection Goal: Prevent transmission of infectious organism to others Description: Prevent the transmission of infectious organisms to other patients, staff members, and visitors. Outcome: Progressing Towards Goal 
  
Problem: Patient Education:  Go to Education Activity Goal: Patient/Family Education Outcome: Progressing Towards Goal 
  
Problem: Pressure Injury - Risk of 
Goal: *Prevention of pressure injury Description: Document Anthony Scale and appropriate interventions in the flowsheet. Outcome: Progressing Towards Goal 
Note: Pressure Injury Interventions: 
Sensory Interventions: Assess changes in LOC, Avoid rigorous massage over bony prominences, Check visual cues for pain, Discuss PT/OT consult with provider, Keep linens dry and wrinkle-free, Maintain/enhance activity level, Minimize linen layers, Monitor skin under medical devices, Pad between skin to skin, Pressure redistribution bed/mattress (bed type) Moisture Interventions: Absorbent underpads, Apply protective barrier, creams and emollients, Check for incontinence Q2 hours and as needed, Internal/External urinary devices, Limit adult briefs, Maintain skin hydration (lotion/cream), Minimize layers, Moisture barrier, Offer toileting Q_hr Activity Interventions: Assess need for specialty bed, Chair cushion, Increase time out of bed, Pressure redistribution bed/mattress(bed type), PT/OT evaluation Mobility Interventions: Chair cushion, HOB 30 degrees or less, Pressure redistribution bed/mattress (bed type), PT/OT evaluation Nutrition Interventions: Document food/fluid/supplement intake Friction and Shear Interventions: Apply protective barrier, creams and emollients, HOB 30 degrees or less, Lift sheet, Lift team/patient mobility team, Minimize layers, Transferring/repositioning devices Problem: Patient Education: Go to Patient Education Activity Goal: Patient/Family Education Outcome: Progressing Towards Goal 
  
Problem: Falls - Risk of 
Goal: *Absence of Falls Description: Document Estela Ruby Fall Risk and appropriate interventions in the flowsheet. Outcome: Progressing Towards Goal 
Note: Fall Risk Interventions: 
Mobility Interventions: Assess mobility with egress test, Bed/chair exit alarm, Communicate number of staff needed for ambulation/transfer, OT consult for ADLs, Patient to call before getting OOB, PT Consult for mobility concerns, PT Consult for assist device competence, Strengthening exercises (ROM-active/passive), Utilize walker, cane, or other assistive device Mentation Interventions: Adequate sleep, hydration, pain control, Bed/chair exit alarm, Door open when patient unattended, Evaluate medications/consider consulting pharmacy, Familiar objects from home, Family/sitter at bedside, Increase mobility, More frequent rounding, Reorient patient, Room close to nurse's station, Toileting rounds, Update white board Medication Interventions: Bed/chair exit alarm, Assess postural VS orthostatic hypotension, Evaluate medications/consider consulting pharmacy, Patient to call before getting OOB Elimination Interventions: Bed/chair exit alarm, Toileting schedule/hourly rounds History of Falls Interventions: Bed/chair exit alarm, Consult care management for discharge planning, Door open when patient unattended, Evaluate medications/consider consulting pharmacy, Investigate reason for fall, Room close to nurse's station, Assess for delayed presentation/identification of injury for 48 hrs (comment for end date), Vital signs minimum Q4HRs X 24 hrs (comment for end date) Problem: Patient Education: Go to Patient Education Activity Goal: Patient/Family Education Outcome: Progressing Towards Goal 
  
Problem: Nutrition Deficit Goal: *Optimize nutritional status Outcome: Progressing Towards Goal 
  
Problem: Pain Goal: *Control of Pain Outcome: Progressing Towards Goal 
Goal: *PALLIATIVE CARE:  Alleviation of Pain Outcome: Progressing Towards Goal 
  
Problem: Patient Education: Go to Patient Education Activity Goal: Patient/Family Education Outcome: Progressing Towards Goal

## 2020-06-15 NOTE — PROGRESS NOTES
vss  
Sleeping soundly Nursing concerned about frail skin and tears Plan 
attempt to mobilize. Disoriented in hospital 
Would consider palliative care and rapid return to home with home hospice type care. Patient will do poorly in snf

## 2020-06-15 NOTE — CONSULTS
Palliative Medicine Consult DR. TORRES'[facility]: [phone] ([phone]) [facility] [place] [phone] [facility]: [phone] Patient Name: Tank De Leon YOB: 1929 Date of Initial Consult: 6/15/2020 Reason for Consult: care decisions Requesting Provider: Wilfrido Thomas and Sae Fallon Primary Care Physician: Julian Lawson MD 
  
 SUMMARY:  
Tank De Leon is a 80y.o. year old with a past history of atrial fib,hypertrophy of prostate with urinary obstruction due to hypertrophy of prostate, mild cognitive dysfunction , osteoporosis who was admitted on 6/12/2020 from home  with a diagnosis of fall about 2 weeks ago now with increased pain. Seen by otho, who suggested PT, non surgical approach. Palliative Medicine is consulted for goals of care. PALLIATIVE DIAGNOSES:  
1. Goals of care 2. Closed compression fracture of lumber vertebra 3. Diarrhea 4. Delirium PLAN:  
1. Goals of care Seen at bedside today along with Ms Soto Kapoor. Patient was not very responsive when seen this am, Seen again with wife present, remains confused wanting to get out of bed to use the restroom. He can name his wife, no conversation following with her or our team. Patient is not able to participate in his medical decisions. Spoke with his wife via phone and in person. Lengthy conversation on overall condition. She shares with us he has fallen in the past and required antibiotics for various infections but none sine December. Wife notes some cognitive dysfunction when at home, but able to attend to his own ADL's. She was able to bring us his AMD which was previously completed. Goals of care discussed at length including the benefits and burdens of resuscitation with advanced age. After thought and consideration wife changed goals of care to DNR/DNI. POST form introduced and discussed, signed by wife for DNR/DNI limited interventions, no feeding tubes. Patient and wife live together. They have 2 living children but both out of town and per wife have health issues of their own. If patient is not able to become more functional and ambulatory,wife will need assistance in caring for him. Discussed with CM. We did broach changing to a more comfort directed care plan if patient does not improve. We will continue to follow with you. 2. Acute on chronic fracture L1 ortho on board / non surgical approach. PT on board. Pain noted with rolling. Concerned about his functional level moving forward, did share this with his wife. 3. Diarrhea continue to have loose stools, wants to get up to use restroom. C diff pending 4. Delirium on Zyprexa 2.5 mg ? Contributing to his sleepiness. Was restless and trying to get of bed. Per wife mild cognitive dysfunction at baseline. 5. Initial consult note routed to primary continuity provider 6. Communicated plan of care with: Palliative IDT, wife, RN, CM  
 
 
 
 
TREATMENT PREFERENCES:  
Code Status: DNR/DNI Advance Care Planning: 
[] The [facility] Interdisciplinary Team has updated the ACP Navigator with [address] and Patient Capacity Primary Decision [facility] ([address]):   Primary Decision Maker: Jurgen Montanez - [phone] Primary Decision Maker: Manuel Camilo - Other Relative - [phone] Secondary Decision Maker: Wyatt Lion - Other Relative - [phone] Medical Interventions: Limited additional interventions Other Instructions: no feeding tubes Artificially Administered Nutrition: No feeding tube Other: As far as possible, the palliative care team has discussed with patient / health care proxy about goals of care / treatment preferences for patient. HISTORY:  
 
History obtained from: chart and wife CHIEF COMPLAINT: fall HPI/SUBJECTIVE: The patient is:  
[] Verbal and participatory [x] Non-participatory due to: confusion Please see summary Clinical Pain Assessment (nonverbal scale for nonverbal patients): Clinical Pain Assessment Severity: 3 Activity (Movement): Seeking attention through movement or slow, cautious movement Duration: for how long has pt been experiencing pain (e.g., 2 days, 1 month, years) Frequency: how often pain is an issue (e.g., several times per day, once every few days, constant) FUNCTIONAL ASSESSMENT:  
 
Palliative Performance Scale (PPS): PPS: 30 
 
ECOG 
ECOG Status : Completely disabled PSYCHOSOCIAL/SPIRITUAL SCREENING:  
  
Any spiritual / [employer] concerns: unable to assess for patient  
[] Yes /  [] No 
 
Caregiver Burnout: 
[] Yes /  [x] No /  [] No Caregiver Present Anticipatory grief assessment:  Unable to assess for patient  
[] Normal  / [] Maladaptive REVIEW OF SYSTEMS:  
 
Positive and pertinent negative findings in ROS are noted above in HPI. The following systems were [] reviewed / [x] unable to be reviewed as noted in HPI Other findings are noted below. Systems: constitutional, ears/nose/mouth/throat, respiratory, gastrointestinal, genitourinary, musculoskeletal, integumentary, neurologic, psychiatric, endocrine. Positive findings noted below. Modified ESAS Completed by: provider Pain: 3 Dyspnea: 0 Stool Occurrence(s): 1 PHYSICAL EXAM:  
 
Wt Readings from Last 3 Encounters:  
06/12/20 53.5 kg (118 lb) 03/06/20 54.9 kg (121 lb) 08/16/19 56.7 kg (125 lb) Blood pressure 117/65, pulse 64, temperature 97.1 °F (36.2 °C), resp. rate 16, height 5' 9\" (1.753 m), weight 53.5 kg (118 lb), SpO2 96 %. Pain: 
Pain Scale 1: Visual 
Pain Intensity 1: 0 Pain Location 1: Back Pain Description 1: Throbbing Pain Intervention(s) 1: Medication (see MAR) Last bowel movement: x 6 yesterday, at least 2 today Constitutional: elderly male who is not very responsive to me, he could tell who is wife is, in NAD  
 Respiratory: breathing not labored Musculoskeletal:  on right side Skin: warm, dry Neurologic: confused will alert at times, oriented to himself, he can tell me who is wife is by name, recognized her in the room HISTORY:  
 
Active Problems: 
  Spinal stenosis, lumbar (2020) Closed compression fracture of L3 vertebra ([facility] [address] (2020) Past Medical History:  
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  
Procedure Laterality Date  HX CORONARY ARTERY BYPASS GRAFT  3/12  HX HERNIA REPAIR    
 HX HIP REPLACEMENT   & '06  
 x 2  
 HX OTHER SURGICAL  10/9/12 [facility] Family History Problem Relation Age of Onset  Cancer Mother  Emphysema Father History reviewed, no pertinent family history. Social History Tobacco Use  Smoking status: Former Smoker Last attempt to quit: 1990 Years since quittin.4  Smokeless tobacco: Never Used Substance Use Topics  Alcohol use: Yes Comment: social  
 
No Known Allergies Current Facility-Administered Medications Medication Dose Route Frequency  tamsulosin (FLOMAX) capsule 0.4 mg  0.4 mg Oral QHS  arformoterol 15 mcg/budesonide 0.5 mg neb solution   Nebulization BID RT  
 loperamide (IMODIUM) capsule 2 mg  2 mg Oral Q4H PRN  
 metroNIDAZOLE (FLAGYL) tablet 500 mg  500 mg Oral TID  acetaminophen (TYLENOL) tablet 650 mg  650 mg Oral Q4H PRN  
 albuterol-ipratropium (DUO-NEB) 2.5 MG-0.5 MG/3 ML  3 mL Nebulization Q4H PRN  
 [START ON 6/18/2020] alendronate (FOSAMAX) tablet 70 mg  70 mg Oral every Thursday  levothyroxine (SYNTHROID) tablet 25 mcg  25 mcg Oral 6am  
 therapeutic multivitamin (THERAGRAN) tablet 1 Tab  1 Tab Oral DAILY  atorvastatin (LIPITOR) tablet 10 mg  10 mg Oral QHS  
 HYDROcodone-acetaminophen (NORCO) 5-325 mg per tablet 1 Tab  1 Tab Oral Q4H PRN  
 ketorolac (TORADOL) injection 15 mg  15 mg IntraVENous Q6H PRN  
 ipratropium (ATROVENT) 0.02 % nebulizer solution 0.5 mg  0.5 mg Nebulization Q6H RT  
 OLANZapine (ZyPREXA) tablet 2.5 mg  2.5 mg Oral QPM  
 LORazepam (ATIVAN) tablet 1 mg  1 mg Oral Q8H PRN  
 
 
 LAB AND IMAGING FINDINGS:  
 
Lab Results Component Value Date/Time WBC 9.5 06/12/2020 01:38 AM  
 HGB 14.1 06/12/2020 01:38 AM  
 PLATELET 978 68/09/8584 01:38 AM  
 
Lab Results Component Value Date/Time Sodium 137 06/12/2020 01:38 AM  
 Potassium 3.9 06/12/2020 01:38 AM  
 Chloride 103 06/12/2020 01:38 AM  
 CO2 28 06/12/2020 01:38 AM  
 BUN 28 (H) 06/12/2020 01:38 AM  
 Creatinine 1.10 06/12/2020 01:38 AM  
 Calcium 9.2 06/12/2020 01:38 AM  
  
Lab Results Component Value Date/Time Alk. phosphatase 193 (H) 06/12/2020 01:38 AM  
 Protein, total 7.2 06/12/2020 01:38 AM  
 Albumin 3.7 06/12/2020 01:38 AM  
 Globulin 3.5 06/12/2020 01:38 AM  
 
Lab Results Component Value Date/Time INR 1.4 (H) 06/15/2020 04:16 AM  
 Prothrombin time 16.5 (H) 06/15/2020 04:16 AM  
  
No results found for: IRON, FE, TIBC, IBCT, PSAT, FERR No results found for: PH, PCO2, PO2 No components found for: [place] Lab Results Component Value Date/Time  06/12/2020 01:38 AM  
 CK - MB 4.2 (H) 06/12/2020 01:38 AM  
  
 
   
 
Total time: 100 minutes Counseling / coordination time, spent as noted above: 80 minutes  
> 50% counseling / coordination: yes with wife, CM Prolonged service was provided for  [x]30 min   []75 min in face to face time in the presence of the patient, spent as noted above. Time Start: 4024 2856, 7887 141 Note: this can only be billed with 29780 (initial) or 06281 (follow up). If multiple start / stop times, list each separately.

## 2020-06-15 NOTE — PROGRESS NOTES
Problem: Mobility Impaired (Adult and Pediatric) Goal: *Acute Goals and Plan of Care (Insert Text) Description: Physical Therapy Goals Initiated 6/15/2020 and to be accomplished within 7 day(s) 1. Patient will move from supine to sit and sit to supine , scoot up and down and roll side to side in bed with moderate assistance . 2.  Patient will transfer from bed to chair and chair to bed with moderate assistance  using the least restrictive device. 3.  Patient will perform sit to stand with moderate assistance . 4. Patient will ambulate with moderate assistance  for 10 feet with the least restrictive device. PLOF: Pt confused throughout session, unable to provide history. Outcome: Progressing Towards Goal 
 
PHYSICAL THERAPY EVALUATION Patient: Rosario De Anda (42 y.o. male) Date: 6/15/2020 Primary Diagnosis: Closed compression fracture of L3 vertebra ([facility] [address] [S32.030A] Spinal stenosis, lumbar [M48.061] Precautions: Fall, Enteric PLOF: see above ASSESSMENT : 
Based on the objective data described below, the patient presents with decreased balance reactions, decreased muscular activation, poor command following, decreased strength and decreased independence in functional mobility. Pt received semi-reclined in bed with sitter present. Pt with many bandages on upper and lower extremities. Pt lethargic, groaning throughout session, not following simple commands in supine. Transitioned from supine to sitting with max-total assist to EOB. Sitting on EOB with constant support, preference for posterior weight shift. Sit to stand with max-total assist to RW, increased trunk flexion and hip hinge. Standing with modA, total assist for alvarez-care. Sit to stand x3 reps, pt reporting need for BM, sitting on bed pan. Pt confused requesting to \"let me up and go to the bathroom\". Pt reminded he was sitting on bed pan.  Pt unable to control bowel, RN notified. Pt returned to supine with total assist. Pt left with all needs met and sitter present. Patient will benefit from skilled intervention to address the above impairments. Patient's rehabilitation potential is considered to be Fair Factors which may influence rehabilitation potential include:  
[]         None noted 
[x]         Mental ability/status [x]         Medical condition 
[]         Home/family situation and support systems 
[x]         Safety awareness 
[]         Pain tolerance/management 
[]         Other: PLAN : 
Recommendations and Planned Interventions:  
[x]           Bed Mobility Training             [x]    Neuromuscular Re-Education 
[x]           Transfer Training                   []    Orthotic/Prosthetic Training 
[x]           Gait Training                          []    Modalities [x]           Therapeutic Exercises           []    Edema Management/Control 
[x]           Therapeutic Activities            [x]    Family Training/Education 
[x]           Patient Education 
[]           Other (comment): Frequency/Duration: Patient will be followed by physical therapy 1-2 times per day/4-7 days per week to address goals. Discharge Recommendations: Joseph Pacheco Further Equipment Recommendations for Discharge: rolling walker SUBJECTIVE:  
Patient stated Let me up, I need to use the bathroom.  OBJECTIVE DATA SUMMARY:  
 
Past Medical History:  
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  
Procedure Laterality Date  HX CORONARY ARTERY BYPASS GRAFT  3/12  HX HERNIA REPAIR    
 HX HIP REPLACEMENT  '60 & '06  
 x 2  
 HX OTHER SURGICAL  10/9/12 [facility] Barriers to Learning/Limitations: yes;  physical and altered mental status (i.e.Sedation, Confusion) Compensate with: Visual Cues, Verbal Cues, and Tactile Cues Home Situation: 
Home Situation Home Environment: Private residence One/Two Story Residence: One story Living Alone: No 
Support Systems: Spouse/Significant Other/Partner Patient Expects to be Discharged to[de-identified] Private residence Current DME Used/Available at Home: [employer] Critical Behavior: 
Neurologic State: Confused Orientation Level: Oriented to person Skin Condition/Temp: Dry;Warm 
  
Skin Integrity: Tear(skin tears on arms) Skin Integumentary Skin Color: Ecchymosis (comment)(legs and arms) Skin Condition/Temp: Dry;Warm 
Skin Integrity: Tear(skin tears on arms) Turgor: Non-tenting Strength:   
Strength: (unable to test due to poor command following ) Tone & Sensation:  
Tone: Abnormal 
  
  
  
  
Sensation: (unable to test ) Range Of Motion: 
AROM: Generally decreased, functional(poor command following ) PROM: Generally decreased, functional 
  
  
  
Posture: 
Posture (WDL): Exceptions to [facility] Posture Assessment: Forward head; Increased;Trunk flexion Functional Mobility: 
Bed Mobility: 
Rolling: Maximum assistance; Total assistance Supine to Sit: Maximum assistance; Total assistance Sit to Supine: Maximum assistance; Total assistance Scooting: Maximum assistance; Total assistance Transfers: Sit to Stand: Maximum assistance Stand to Sit: Moderate assistance;Maximum assistance Balance:  
Sitting: Impaired; With support Sitting - Static: Poor (constant support); Supported sitting Sitting - Dynamic: Poor (constant support) Standing: Impaired; With support Standing - Static: Poor Wheelchair Mobility: 
  
  
Ambulation/Gait Training: 
  
  
  
  
  
  
  
  
  
  
  
  
  
  
  
  
  
Stairs: 
  
  
  
  
 
Pain: No reports of pain but groaning with all transfers. Activity Tolerance:  
Poor activity tolerance Please refer to the flowsheet for vital signs taken during this treatment. After treatment:  
[]         Patient left in no apparent distress sitting up in chair 
[x]         Patient left in no apparent distress in bed 
[x]         Call bell left within reach [x]         Nursing notified 
[]         Caregiver present 
[]         Bed alarm activated 
[]         SCDs applied COMMUNICATION/EDUCATION:  
[x]         Role of Physical Therapy in the acute care setting. []         Fall prevention education was provided and the patient/caregiver indicated understanding. []         Patient/family have participated as able in goal setting and plan of care. []         Patient/family agree to work toward stated goals and plan of care. []         Patient understands intent and goals of therapy, but is neutral about his/her participation. [x]         Patient is unable to participate in goal setting/plan of care: ongoing with therapy staff. 
[]         Other: Thank you for this referral. 
Quita Gallegos, PT Time Calculation: 40 mins Eval Complexity: History: MEDIUM  Complexity : 1-2 comorbidities / personal factors will impact the outcome/ POC Exam:MEDIUM Complexity : 3 Standardized tests and measures addressing body structure, function, activity limitation and / or participation in recreation  Presentation: MEDIUM Complexity : Evolving with changing characteristics  Clinical Decision Making:Medium Complexity mod  Overall Complexity:MEDIUM

## 2020-06-15 NOTE — CDMP QUERY
Patient admitted with  acute on chronic L1 fx. If possible, please document in progress notes and d/c summary if you are evaluating and /or treating any of the following:  Mild protein calorie malnutrition-RD following/supplements ordered  Underweight  Other (please specify)  Unable to determine The medical record reflects the following: 
  Risk Factors: Elderly; diarrhea; psychosis Clinical Indicators: BMI 17.43 Nutrition Diagnosis: Unintended weight loss related to predicted prolonged inadequate energy intake as evidenced by wt loss of 24 lb, 16.6% x 8 years PTA. Treatment: - Modify supplement: change to Ensure Enlive BID 
- Update food preferences - Monitor/ encourage po intake of meals/supplements 
- Continue RD inpatient monitoring and evaluation. Thank you, Wilberto Jones [email or web address]

## 2020-06-15 NOTE — PROGRESS NOTES
Bedside and Verbal shift change report given to CASIE Shukla (oncoming nurse) by Sherri Sapp RN (offgoing nurse). Report included the following information SBAR and Kardex.

## 2020-06-15 NOTE — PROGRESS NOTES
Speech Therapy Note: 
 
1963: SLP orders received and attempted however, patient:   
 
[x]  Lethargic, eyes opened briefly but unable to maintain alertness enough for safe PO intake [x]  Refused participation []  Off the unit []  NPO for procedure 
[]  Other: CNA at bedside reports patient has been sleeping all day. 1405: Re-attempted evaluation. Palliative care team at bedside, patient moaning/crying loudly. SLP will f/u next day or as medically indicated. Thank you for this referral.  
 
Cuauhtemoc Edwards M.S., CCC-SLP Speech Language Pathologist

## 2020-06-15 NOTE — PROGRESS NOTES
Problem: Risk for Spread of Infection Goal: Prevent transmission of infectious organism to others Description: Prevent the transmission of infectious organisms to other patients, staff members, and visitors. Outcome: Progressing Towards Goal 
  
Problem: Patient Education:  Go to Education Activity Goal: Patient/Family Education Outcome: Progressing Towards Goal 
  
Problem: Pressure Injury - Risk of 
Goal: *Prevention of pressure injury Description: Document Anthony Scale and appropriate interventions in the flowsheet. Outcome: Progressing Towards Goal 
Note: Pressure Injury Interventions: 
Sensory Interventions: Assess changes in LOC, Assess need for specialty bed, Avoid rigorous massage over bony prominences, Check visual cues for pain, Discuss PT/OT consult with provider, Float heels, Keep linens dry and wrinkle-free, Maintain/enhance activity level, Minimize linen layers, Monitor skin under medical devices, Pad between skin to skin, Pressure redistribution bed/mattress (bed type), Turn and reposition approx. every two hours (pillows and wedges if needed) Moisture Interventions: Absorbent underpads, Apply protective barrier, creams and emollients, Assess need for specialty bed, Check for incontinence Q2 hours and as needed, Internal/External urinary devices, Limit adult briefs, Maintain skin hydration (lotion/cream), Minimize layers, Moisture barrier, Offer toileting Q_hr Activity Interventions: Assess need for specialty bed, Pressure redistribution bed/mattress(bed type), PT/OT evaluation Mobility Interventions: Assess need for specialty bed, Chair cushion, Float heels, HOB 30 degrees or less, Pressure redistribution bed/mattress (bed type), PT/OT evaluation, Turn and reposition approx. every two hours(pillow and wedges) Nutrition Interventions: Document food/fluid/supplement intake, Discuss nutritional consult with provider, Offer support with meals,snacks and hydration Friction and Shear Interventions: Apply protective barrier, creams and emollients, Foam dressings/transparent film/skin sealants, HOB 30 degrees or less, Lift sheet, Lift team/patient mobility team, Minimize layers, Transferring/repositioning devices Problem: Patient Education: Go to Patient Education Activity Goal: Patient/Family Education Outcome: Progressing Towards Goal 
  
Problem: Falls - Risk of 
Goal: *Absence of Falls Description: Document Vicki Mathews Fall Risk and appropriate interventions in the flowsheet. Outcome: Progressing Towards Goal 
Note: Fall Risk Interventions: 
Mobility Interventions: Assess mobility with egress test, Bed/chair exit alarm, Communicate number of staff needed for ambulation/transfer, OT consult for ADLs, Patient to call before getting OOB, PT Consult for mobility concerns, PT Consult for assist device competence, Strengthening exercises (ROM-active/passive) Mentation Interventions: Adequate sleep, hydration, pain control, Bed/chair exit alarm, Door open when patient unattended, Evaluate medications/consider consulting pharmacy, Eyeglasses and hearing aids, Familiar objects from home, More frequent rounding, Increase mobility, Reorient patient, Room close to nurse's station, Toileting rounds, Update white board Medication Interventions: Assess postural VS orthostatic hypotension, Bed/chair exit alarm, Evaluate medications/consider consulting pharmacy, Patient to call before getting OOB Elimination Interventions: Bed/chair exit alarm, Call light in reach, Patient to call for help with toileting needs, Toilet paper/wipes in reach, Toileting schedule/hourly rounds History of Falls Interventions: Bed/chair exit alarm, Consult care management for discharge planning, Door open when patient unattended, Evaluate medications/consider consulting pharmacy, Investigate reason for fall, Room close to nurse's station, Vital signs minimum Q4HRs X 24 hrs (comment for end date), Assess for delayed presentation/identification of injury for 48 hrs (comment for end date) Problem: Patient Education: Go to Patient Education Activity Goal: Patient/Family Education Outcome: Progressing Towards Goal 
  
Problem: Nutrition Deficit Goal: *Optimize nutritional status Outcome: Progressing Towards Goal 
  
Problem: Pain Goal: *Control of Pain Outcome: Progressing Towards Goal 
Goal: *PALLIATIVE CARE:  Alleviation of Pain Outcome: Progressing Towards Goal 
  
Problem: Patient Education: Go to Patient Education Activity Goal: Patient/Family Education Outcome: Progressing Towards Goal

## 2020-06-15 NOTE — PROGRESS NOTES
[facility] Hospitalist Group Progress Note Patient: Irvin Boeck Age: 80 y.o. : 1929 MR#: [id number] SSN: [id number] Date/Time: 6/15/2020 Subjective: Indeterminate for Toxigenic C. difficile, DNA confirmation to follow. DNA collected 20. Patient per report has reversed sleep wake cycle. Sitter reports he ate some breakfast, no overt signs of aspiration, then declined further intake. Per sitter  He is sleeping soundly. Assessment/Plan:  
Patient with fall and L fracture 1. Acute on chronic L1 fracture from fall 2. Fall same level - tripped and fell 3. Steroid induced psychosis 4. Hypothyroid 5. Severe osteoporosis 6. dyslipidemia 7. Urinary retention - S/p Nuñez cath with 500 ml urine out put - add flomax / urology consult in AM  
8. Diarrhea - C diff PENDING  
9. Underweight Body mass index is 17.43 kg/m². 10. Avoid chemical dvt prophylaxis 11. DNR / DNI - palliative care input appreciated. Case discussed with:  [x]Patient  [] Family/ wife  ( In room with patient )    [x]Nursing  []Case Management DVT Prophylaxis:  []Lovenox  []Hep SQ  [x]SCDs  []Coumadin   []On Heparin gtt Objective:  
VS:  
Visit Vitals /65 Pulse 98 Temp 97 °F (36.1 °C) Resp 18 Ht 5' 9\" (1.753 m) Wt 53.5 kg (118 lb) SpO2 97% BMI 17.43 kg/m² Tmax/24hrs: Temp (24hrs), Av.5 °F (36.9 °C), Min:97 °F (36.1 °C), Max:99.3 °F (37.4 °C) IOBRIEF Intake/Output Summary (Last 24 hours) at 6/15/2020 1008 Last data filed at 6/15/2020 8418 Gross per 24 hour Intake 240 ml Output 625 ml Net -385 ml General: asleep. Sitter at bedside. Cardiovascular: S1S2 - regular , No Murmur Pulmonary: Equal expansion , No Use of accessory muscles , No Rales No Rhonchi GI:  +BS in all four quadrants, soft, non-tender Extremities:  No edema; 2+ dorsalis pedis pulses bilaterally Neuro: asleep, not answering questions, not following commands Medications:  
Current Facility-Administered Medications Medication Dose Route Frequency  tamsulosin (FLOMAX) capsule 0.4 mg  0.4 mg Oral QHS  arformoterol 15 mcg/budesonide 0.5 mg neb solution   Nebulization BID RT  
 loperamide (IMODIUM) capsule 2 mg  2 mg Oral Q4H PRN  
 metroNIDAZOLE (FLAGYL) tablet 500 mg  500 mg Oral TID  acetaminophen (TYLENOL) tablet 650 mg  650 mg Oral Q4H PRN  
 albuterol-ipratropium (DUO-NEB) 2.5 MG-0.5 MG/3 ML  3 mL Nebulization Q4H PRN  
 [START ON 6/18/2020] alendronate (FOSAMAX) tablet 70 mg  70 mg Oral every Thursday  levothyroxine (SYNTHROID) tablet 25 mcg  25 mcg Oral 6am  
 therapeutic multivitamin (THERAGRAN) tablet 1 Tab  1 Tab Oral DAILY  atorvastatin (LIPITOR) tablet 10 mg  10 mg Oral QHS  
 HYDROcodone-acetaminophen (NORCO) 5-325 mg per tablet 1 Tab  1 Tab Oral Q4H PRN  
 ketorolac (TORADOL) injection 15 mg  15 mg IntraVENous Q6H PRN  
 ipratropium (ATROVENT) 0.02 % nebulizer solution 0.5 mg  0.5 mg Nebulization Q6H RT  
 OLANZapine (ZyPREXA) tablet 2.5 mg  2.5 mg Oral QPM  
 LORazepam (ATIVAN) tablet 1 mg  1 mg Oral Q8H PRN Labs:   
No results for input(s): WBC, HGB, HCT, PLT, HGBEXT, HCTEXT, PLTEXT, HGBEXT, HCTEXT, PLTEXT in the last 72 hours. Recent Labs  
  06/15/20 
0416 INR 1.4* Disclaimer: Sections of this note are dictated utilizing voice recognition software, which may have resulted in some phonetic based errors in grammar and contents. Even though attempts were made to correct all the mistakes, some may have been missed, and remained in the body of the document. If questions arise, please contact our department. Signed By: Freddie Hernandez MD   
 Mary 15, 2020

## 2020-06-15 NOTE — PROGRESS NOTES
Palliative Medicine Advanced Steps Advance Care Planning [facility] (Physician Orders for Scope of Treatment) Date of conversation: 6/15/2020   Location: [facility] Length (minutes): 20 Participants: 
 
 [] Healthcare agent (already designated in existing ACP document) Name: Brenda Summers Relationship to Patient: Spouse Phone number: [phone] Advanced Steps® ACP Facilitator:  Eric Houston NP Conversation Topics (If Patient does not have decision making capacity, Agent/Surrogate responds based on understanding of how patient would respond if capable) Understanding of Medical Condition/s AND Potential Complications: 
 
Healthcare Agent/Other Surrogate: Understands he fractured his lumbar vertebrae d/t fall. Understands he might not be able to go to SNF because he isn't participating in therapy. She will be unable to care for him at home if he doesn't improve. Identifies the following as important for living well: Being able to walk and be independent. Also loves yard work. Hopes: He gets better. Worries/Fears about Medical Condition: Worried about him having c-diff. Worried about him not improving and not being able to ambulate. Needs to discuss with spiritual/Mandaen advisor: [] Yes  [x] No 
 
Needs more information about illness and complications:  [] Yes  [x] No 
 
 
Cardiopulmonary Resuscitation \"What do you understand about CPR? \" Response: She thought it was just chest compressions and breathing through the mouth. Once it was explained in detail she had a better understanding of all components of CPR. Order Elected for CPR:  []  Attempt Resuscitation [x]  Do Not Attempt Resuscitation When NOT in Cardiopulmonary Arrest, Order Elected:   
 
[] Comfort Measures 
[x] Limited Additional Interventions [] Full Interventions Artificially Administered Nutrition, Order Elected: 
 
[x] No Feeding Tube [] Feeding Tube for a defined trial period 
[] Feeding Tube long-term if indicated The following was provided (check all that apply): Healthcare Decision Information Cards: 
 [] Help with Breathing Facts 
 [] Tube Feeding Facts [] CPR Facts [x] Review of existing Advance Directive 
[] Assistance with Completion of New Advance Directive [x] Review of [place] POST Form Meeting Outcomes: 
 [] ACP discussion completed 
 [x] [place] form completed 
[x] [facility] for Provider review and signature 
 [x] Original placed on Chart, if in facility (form to be sent with patient at discharge) [x] Copy given to healthcare agent  
 [] Copy scanned to electronic medical record Follow-up plan:   
 [] Schedule follow-up conversation to continue planning 
 [x] Referred individual to Provider for additional questions/concerns [x] Advised patient/agent/surrogate to review completed POST form and update if needed with changes in condition, patient preferences or care setting  
 
[] This note routed to one or more involved healthcare providers Pt is DNR/DNI. BSRN updated. Thank you for the Palliative Medicine consult and allowing us to participate in the care of Mr. Savannah Tripp. Will continue to monitor and provide support. AMY ChristensenN Palliative Medicine Inpatient RN DR. [facility] Palliative [place] Line: [phone])

## 2020-06-16 NOTE — PROGRESS NOTES
Problem: Dysphagia (Adult) Goal: *Acute Goals and Plan of Care (Insert Text) Description: Dysphagia Present: mild oropharyngeal 
Aspiration: delayed throat clear Recommendations: 
Diet: dental soft solids/ thin, feeder/ assist with feeding as needed Meds: whole in puree Aspiration Precautions Oral Care TID Other: possible MBS if s/sx aspiration continue Goals:  Patient will: 1. Tolerate PO trials with no overt s/s aspiration or distress in 4/5 trials 2. Complete an objective swallow study (i.e., MBSS) to assess swallow integrity, r/o aspiration, and determine of safest LRD, min A Outcome: Progressing Towards Goal 
 
 
SPEECH LANGUAGE PATHOLOGY BEDSIDE SWALLOW  
EVALUATION & TREATMENT Patient: Gilda Flanagan (88 y.o. male) Date: 6/16/2020 Primary Diagnosis: Closed compression fracture of L3 vertebra ([facility] [address] [S32.030A] Spinal stenosis, lumbar [M48.061] Precautions: aspiration PLOF: as per H&P 
 
ASSESSMENT : 
Based on the objective data described below, the patient presents with mild oropharyngeal dysphagia in setting of fall, closed compression fx of L3. Pt awake, confused, fidgety. Per nursing, pt just consumed 4 oz puree without difficulty. Pt with natural dentition, fair lingual/ labial ROM/ coordination, decreased strength. Pt accepted regular solids and straw sips thin with mild- moderately prolonged mastication with delayed A-P transit, mild lingual residue which cleared with liquid wash, fairly timely swallow response with good laryngeal elevation for age, no immediate overt s/sx aspiration following, clear vocal quality and breath sounds. Pt with delayed mild throat clear, ?from increased saliva production with spillage into pharynx, vs penetration/ aspiration with delayed sensation, vs pharyngeal residue?   Currently, rec downgrade solids to dental soft, continue thin liquids, small, single straw sips, aspiration precautions, assist with feeding as needed. SLP will f/u with dysphagia tx, tolerance of safest po feeds, possible MBS if s/sx aspiration continue. TREATMENT : 
Skilled therapy initiated; Educated pt on aspiration precautions and importance of compensatory swallow techniques to decrease aspiration risk (HOB >45* for all po intake and ~30 minutes after po, decreased rate of intake, small bites/ sips, alternate solids/ liquids, assist with feeding as needed); pt unable to verbalize comprehension, educated nurse to previous. SLP to follow as indicated. Patient will benefit from skilled intervention to address the above impairments. Patient's rehabilitation potential is considered to be Fair Factors which may influence rehabilitation potential include:  
[]            None noted [x]            Mental ability/status [x]            Medical condition []            Home/family situation and support systems [x]            Safety awareness 
[]            Pain tolerance/management [x]            Other: level awareness PLAN : 
Recommendations and Planned Interventions: 
 downgrade solids to dental soft, continue thin liquids, small, single straw sips, aspiration precautions, assist with feeding as needed. Frequency/Duration: Patient will be followed by speech-language pathology 1-2 times per day/3-5 days per week to address goals. Discharge Recommendations: Home Health, [facility], and To Be Determined SUBJECTIVE:  
Patient stated Ermalinda Leaver are you going to come see me again?  OBJECTIVE:  
 
Past Medical History:  
Diagnosis Date Actinic keratosis Arthropathy, unspecified, site unspecified Atrial fibrillation (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  
Procedure Laterality Date HX CORONARY ARTERY BYPASS GRAFT  3/12 HX HERNIA REPAIR    
 HX HIP REPLACEMENT  '57 & '06  
 x 2 HX OTHER SURGICAL  10/9/12 [facility] Home Situation:  
Home Situation Home Environment: Private residence One/Two Story Residence: One story Living Alone: No 
Support Systems: Spouse/Significant Other/Partner Patient Expects to be Discharged to[de-identified] Private residence Current DME Used/Available at Home: Dueshani Contes Diet prior to admission: unknown, suspect regular/ thin Current Diet:  regular/ thin  
 
Cognitive and Communication Status: 
Neurologic State: Alert, Confused Orientation Level: Oriented to person Cognition: Follows commands Perception: Appears intact Perseveration: Perseverates during mobility Safety/Judgement: Fall prevention Oral Assessment: 
Oral Assessment Labial: Decreased rate Dentition: Natural;Full Oral Hygiene: good Lingual: Decreased rate;Decreased strength Velum: No impairment Mandible: No impairment P.O. Trials: 
Patient Position: [facility] 50* Vocal quality prior to P.O.: (suspect baseline) Consistency Presented: Thin liquid; Solid How Presented: SLP-fed/presented;Straw;Successive swallows Bolus Acceptance: No impairment Bolus Formation/Control: Impaired Type of Impairment: Delayed;Mastication Propulsion: Delayed (# of seconds) Oral Residue: Less than 10% of bolus; Lingual 
Initiation of Swallow: No impairment Laryngeal Elevation: Functional 
Aspiration Signs/Symptoms: Clear throat Pharyngeal Phase Characteristics: Suspected pharyngeal residue Effective Modifications: Alternate liquids/solids;Small sips and bites Cues for Modifications: Moderate-maximal 
  
 
Oral Phase Severity: Mild Pharyngeal Phase Severity : Mild PAIN: 
Pain level pre-treatment: 0/10 Pain level post-treatment: 0/10 Pain Intervention(s): Medication (see MAR); Rest, Ice, Repositioning Response to intervention: Nurse notified, See doc flow After treatment:  
[]            Patient left in no apparent distress sitting up in chair 
[x]            Patient left in no apparent distress in bed 
[x]            Call bell left within reach [x]            Nursing notified []            Family present 
[]            Caregiver present 
[]            Bed alarm activated COMMUNICATION/EDUCATION:  
[x]            Aspiration precautions; swallow safety; compensatory techniques. []            Patient/family have participated as able in goal setting and plan of care. []            Patient/family agree to work toward stated goals and plan of care. []            Patient understands intent and goals of therapy; neutral about participation. [x]            Patient unable to participate in goal setting/plan of care; educ ongoing with interdisciplinary staff [x]         Posted safety precautions in patient's room. Thank you for this referral. 
Rolly Farrar MS, CCC/SLP Time Calculation: 21 mins Evaluation Time: 11 minutes Treatment Time: 10 minutes

## 2020-06-16 NOTE — CONSULTS
Palliative Medicine Consult [facility]: [phone] ([phone]) [facility]: [phone] [facility]: [phone] Patient Name: Shireen Ganser YOB: 1929 Date of Initial Consult: 6/15/2020, follow up 6/16/2020 Reason for Consult: care decisions Requesting Provider: Wilfrido Kowalski and Trena Garcia Primary Care Physician: Tenzin Lemno MD 
  
 SUMMARY:  
Shireen Ganser is a 80y.o. year old with a past history of atrial fib,hypertrophy of prostate with urinary obstruction due to hypertrophy of prostate, mild cognitive dysfunction , osteoporosis who was admitted on 6/12/2020 from home  with a diagnosis of fall about 2 weeks ago now with increased pain. Seen by otho, who suggested PT, non surgical approach. Palliative Medicine is consulted for goals of care. 6/16/2020 seen x 2 today. This am responded to [facility], still confused. Agitated at times. Ate a bit of breakfast. Per RN not drinking. Met with wife this afternoon, He is C diff + wife aware. Not sure he is going to eat and drink enough to sustain himself for a long period of time. Wife does not want feeding tubes, I agree. Will place Hospice consult for information. Wife aware and agreeable PALLIATIVE DIAGNOSES:  
1. Goals of care 2. Closed compression fracture of lumber vertebra 3. Diarrhea 4. Delirium PLAN:  
1. 6/16/2020 seen x 2 today. He seems a bit more verbal to me then yesterday, however still confused now requiring soft restraints for safety. Per RN will take his meds, ate a bit of breakfast, doesn't drink fluids well. C diff + wife is aware. Per BSRN less episodes of loss stools. Spoke with wife again today. She is very worried about her , offered support. Honest but compassionate discussion, just not sure how he is going to do in the long term.  I expressed my worry, concerned he will not eat and drink enough to sustain himself in the long term. Despite his functional level at home, worry he will no longer be able to obtain this level of functioning. Discussed placing a hospice consult for information of which she is agreeable with. PT/ ST on going. Will discuss choices with her tomorrow. Discussed with attending. Goals of care DNR/DNI, limited interventions, no feeding tubes ( please see below for previous conversations) 2. Goals of care Seen at bedside today along with Ms Marisa Richards. Patient was not very responsive when seen this am, Seen again with wife present, remains confused wanting to get out of bed to use the restroom. He can name his wife, no conversation following with her or our team. Patient is not able to participate in his medical decisions. Spoke with his wife via phone and in person. Lengthy conversation on overall condition. She shares with us he has fallen in the past and required antibiotics for various infections but none sine December. Wife notes some cognitive dysfunction when at home, but able to attend to his own ADL's. She was able to bring us his AMD which was previously completed. Goals of care discussed at length including the benefits and burdens of resuscitation with advanced age. After thought and consideration wife changed goals of care to DNR/DNI. POST form introduced and discussed, signed by wife for DNR/DNI limited interventions, no feeding tubes. Patient and wife live together. They have 2 living children but both out of town and per wife have health issues of their own. If patient is not able to become more functional and ambulatory,wife will need assistance in caring for him. Discussed with CM. We did broach changing to a more comfort directed care plan if patient does not improve. We will continue to follow with you. 3. Acute on chronic fracture L1 ortho on board / non surgical approach. PT on board. Pain noted with rolling.  Concerned about his functional level moving forward, did share this with his wife. 4. Diarrhea continue to have loose stools, wants to get up to use restroom. C diff pending 5. Delirium on Zyprexa 2.5 mg ? Contributing to his sleepiness. Was restless and trying to get of bed. Per wife mild cognitive dysfunction at baseline. 6. Initial consult note routed to primary continuity provider 7. Communicated plan of care with: Palliative IDT, wife, RN, CM  
 
 
 
 
TREATMENT PREFERENCES:  
Code Status: DNR/DNI Advance Care Planning: 
[] The [facility] Interdisciplinary Team has updated the ACP Navigator with [address] and Patient Capacity Primary Decision [facility] ([address]):   Primary Decision Maker: Brian Ramirez - [phone] Primary Decision Maker: Leandro Rob - Other Relative - [phone] Secondary Decision Maker: Fidencio Jones - Other Relative - [phone] Medical Interventions: Limited additional interventions Other Instructions: no feeding tubes Artificially Administered Nutrition: No feeding tube Other: As far as possible, the palliative care team has discussed with patient / health care proxy about goals of care / treatment preferences for patient. HISTORY:  
 
History obtained from: chart and wife CHIEF COMPLAINT: fall HPI/SUBJECTIVE: The patient is:  
[] Verbal and participatory [x] Non-participatory due to: confusion Please see summary Clinical Pain Assessment (nonverbal scale for nonverbal patients): Clinical Pain Assessment Severity: 0 Activity (Movement): Lying quietly, normal position Duration: for how long has pt been experiencing pain (e.g., 2 days, 1 month, years) Frequency: how often pain is an issue (e.g., several times per day, once every few days, constant) FUNCTIONAL ASSESSMENT:  
 
Palliative Performance Scale (PPS): PPS: 30 
 
ECOG 
ECOG Status : Completely disabled  PSYCHOSOCIAL/SPIRITUAL SCREENING:  
  
 Any spiritual / Adventism concerns: unable to assess for patient  
[] Yes /  [] No 
 
Caregiver Burnout: 
[] Yes /  [x] No /  [] No Caregiver Present Anticipatory grief assessment:  Unable to assess for patient  
[] Normal  / [] Maladaptive REVIEW OF SYSTEMS:  
 
Positive and pertinent negative findings in ROS are noted above in HPI. The following systems were [] reviewed / [x] unable to be reviewed as noted in HPI Other findings are noted below. Systems: constitutional, ears/nose/mouth/throat, respiratory, gastrointestinal, genitourinary, musculoskeletal, integumentary, neurologic, psychiatric, endocrine. Positive findings noted below. Modified ESAS Completed by: provider Pain: 0 Dyspnea: 0 Stool Occurrence(s): 1 PHYSICAL EXAM:  
 
Wt Readings from Last 3 Encounters:  
06/12/20 53.5 kg (118 lb) 03/06/20 54.9 kg (121 lb) 08/16/19 56.7 kg (125 lb) Blood pressure 98/59, pulse 85, temperature 97.4 °F (36.3 °C), resp. rate 14, height 5' 9\" (1.753 m), weight 53.5 kg (118 lb), SpO2 92 %. Pain: 
Pain Scale 1: PAINAD (Advanced Dementia) Pain Intensity 1: 0 Pain Location 1: Back Pain Description 1: Throbbing Pain Intervention(s) 1: Medication (see MAR) Last bowel movement: x 1 today Constitutional: seems more responsive this am, still very confused tries to get out of bed at times, in NAD Respiratory: breathing not labored Musculoskeletal:  on right side Skin: warm, dry Neurologic: more verbal this am, confused knows and names his wife. HISTORY:  
 
Active Problems: 
  Spinal stenosis, lumbar (6/12/2020) Closed compression fracture of L3 vertebra ([address] (6/12/2020) Past Medical History:  
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  
Procedure Laterality Date  HX CORONARY ARTERY BYPASS GRAFT  3/12  HX HERNIA REPAIR    
 HX HIP REPLACEMENT   & '06  
 x 2  
 HX OTHER SURGICAL  10/9/12 [facility] Family History Problem Relation Age of Onset  Cancer Mother  Emphysema Father History reviewed, no pertinent family history. Social History Tobacco Use  Smoking status: Former Smoker Last attempt to quit: 1990 Years since quittin.4  Smokeless tobacco: Never Used Substance Use Topics  Alcohol use: Yes Comment: social  
 
No Known Allergies Current Facility-Administered Medications Medication Dose Route Frequency  arformoteroL (BROVANA) neb solution 15 mcg  15 mcg Nebulization BID RT  
 budesonide (PULMICORT) 500 mcg/2 ml nebulizer suspension  500 mcg Nebulization BID RT  
 phosphorus (K PHOS NEUTRAL) 250 mg tablet 1 Tab  1 Tab Oral BID  QUEtiapine (SEROquel) tablet 12.5 mg  12.5 mg Oral DAILY  mirtazapine (REMERON SOL-TAB) disintegrating tablet 15 mg  15 mg Oral QHS  vancomycin (FIRVANQ) 50 mg/mL oral solution 125 mg  125 mg Oral Q6H  
 tamsulosin (FLOMAX) capsule 0.4 mg  0.4 mg Oral QHS  loperamide (IMODIUM) capsule 2 mg  2 mg Oral Q4H PRN  
 acetaminophen (TYLENOL) tablet 650 mg  650 mg Oral Q4H PRN  
 albuterol-ipratropium (DUO-NEB) 2.5 MG-0.5 MG/3 ML  3 mL Nebulization Q4H PRN  
  [START ON 6/18/2020] alendronate (FOSAMAX) tablet 70 mg  70 mg Oral every Thursday  levothyroxine (SYNTHROID) tablet 25 mcg  25 mcg Oral 6am  
 therapeutic multivitamin (THERAGRAN) tablet 1 Tab  1 Tab Oral DAILY  atorvastatin (LIPITOR) tablet 10 mg  10 mg Oral QHS  
 HYDROcodone-acetaminophen (NORCO) 5-325 mg per tablet 1 Tab  1 Tab Oral Q4H PRN  
 ketorolac (TORADOL) injection 15 mg  15 mg IntraVENous Q6H PRN  
 ipratropium (ATROVENT) 0.02 % nebulizer solution 0.5 mg  0.5 mg Nebulization Q6H RT  
 LORazepam (ATIVAN) tablet 1 mg  1 mg Oral Q8H PRN  
 
 
 LAB AND IMAGING FINDINGS:  
 
Lab Results Component Value Date/Time WBC 10.8 06/16/2020 03:27 AM  
 HGB 14.1 06/16/2020 03:27 AM  
 PLATELET 872 75/41/8131 03:27 AM  
 
Lab Results Component Value Date/Time Sodium 140 06/16/2020 03:27 AM  
 Potassium 3.5 06/16/2020 03:27 AM  
 Chloride 103 06/16/2020 03:27 AM  
 CO2 33 (H) 06/16/2020 03:27 AM  
 BUN 38 (H) 06/16/2020 03:27 AM  
 Creatinine 1.03 06/16/2020 03:27 AM  
 Calcium 8.5 06/16/2020 03:27 AM  
 Magnesium 2.3 06/16/2020 03:27 AM  
 Phosphorus 1.6 (L) 06/16/2020 03:27 AM  
  
Lab Results Component Value Date/Time Alk. phosphatase 193 (H) 06/12/2020 01:38 AM  
 Protein, total 7.2 06/12/2020 01:38 AM  
 Albumin 3.7 06/12/2020 01:38 AM  
 Globulin 3.5 06/12/2020 01:38 AM  
 
Lab Results Component Value Date/Time INR 1.4 (H) 06/15/2020 04:16 AM  
 Prothrombin time 16.5 (H) 06/15/2020 04:16 AM  
  
No results found for: IRON, FE, TIBC, IBCT, PSAT, FERR No results found for: PH, PCO2, PO2 No components found for: [place] Lab Results Component Value Date/Time  06/12/2020 01:38 AM  
 CK - MB 4.2 (H) 06/12/2020 01:38 AM  
  
 
   
 
Total time: 35 minutes Counseling / coordination time, spent as noted above: 25 minutes  
> 50% counseling / coordination: yes with wife, CM Prolonged service was provided for  []30 min   []75 min in face to face time in the presence of the patient, spent as noted above. Time Start:   
Note: this can only be billed with 51237 (initial) or 01772 (follow up). If multiple start / stop times, list each separately.

## 2020-06-16 NOTE — HOSPICE
1600 Called and spoke with Jarek Styles, pt's wife via telephone. She is currently at the bedside and pt can be heard moaning in the background. She agreeable for this nurse to come to bedside tomorrow and meet with her at 2 pm to discuss hospice goals and philosophy. Hospice referral received. Chart review in process. Thank you for the referral to [address] [place] [address]. If we can be of further assistance please contact [phone] James Denson RN [place] [address] [place], [facility] [address] 
[phone] Email: [email or web address]

## 2020-06-16 NOTE — PROGRESS NOTES
History  Chief Complaint   Patient presents with    Follow-Up Abscess     "I'm here to get my boils checked, they cut them open here on Friday  They feel better than they did, still sore though  I'm taking my medicines they rx'd"       History provided by:  Patient  Follow-Up Abscess   Location:  Torso  Torso abscess location:  Abd RUQ and abd RLQ  Abscess quality: draining, induration and painful    Red streaking: no    Duration:  5 days  Progression:  Improving  Pain details:     Quality:  Aching    Severity:  Mild    Duration:  4 days    Timing:  Constant    Progression:  Improving  Chronicity:  New      Prior to Admission Medications   Prescriptions Last Dose Informant Patient Reported? Taking? albuterol (PROVENTIL HFA,VENTOLIN HFA) 90 mcg/act inhaler   No No   Sig: Inhale 2 puffs every 4 (four) hours as needed for wheezing   cephalexin (KEFLEX) 500 mg capsule   No No   Sig: Take 1 capsule (500 mg total) by mouth every 8 (eight) hours for 10 days   ibuprofen (MOTRIN) 600 mg tablet   No No   Sig: Take 1 tablet (600 mg total) by mouth every 6 (six) hours as needed (pain)   sulfamethoxazole-trimethoprim (BACTRIM DS) 800-160 mg per tablet   No No   Sig: Take 1 tablet by mouth 2 (two) times a day for 7 days smx-tmp DS (BACTRIM) 800-160 mg tabs (1tab q12 D10)      Facility-Administered Medications: None       Past Medical History:   Diagnosis Date    Back pain     Knee pain     MRSA carrier        Past Surgical History:   Procedure Laterality Date    TONSILLECTOMY         History reviewed  No pertinent family history  I have reviewed and agree with the history as documented  Social History   Substance Use Topics    Smoking status: Never Smoker    Smokeless tobacco: Never Used    Alcohol use Yes      Comment: social        Review of Systems   Constitutional: Negative  HENT: Negative  Eyes: Negative  Respiratory: Negative  Cardiovascular: Negative  Gastrointestinal: Negative  Progress Note: 
Patient moaning, able to get him to take pain medication, continue to monitor. Patient remains awake with 2-3 hours of sleepmduring the night. Patient picking at dressings and at murcia, continue to monitor, remains in isolation,  Mitts on hands for protection, two stool eliminated,  Poor po intake, taking sips of juice with meds only. bed alarm on for safety. Patient Vitals for the past 12 hrs: 
 Temp Pulse Resp BP SpO2  
06/16/20 0330 99 °F (37.2 °C) 80 18 108/61 92 % 06/15/20 2350 97.9 °F (36.6 °C) 91 18 112/61 94 % 06/15/20 1932 98.1 °F (36.7 °C) 70 18 100/65 95 % 06/15/20 1917     98 % Endocrine: Negative  Genitourinary: Negative  Musculoskeletal: Negative  Skin:        Abscess recheck   Allergic/Immunologic: Negative  Neurological: Negative  Hematological: Negative  Psychiatric/Behavioral: Negative  All other systems reviewed and are negative  Physical Exam  Physical Exam   Constitutional: She is oriented to person, place, and time  She appears well-developed and well-nourished  HENT:   Head: Normocephalic  Right Ear: External ear normal    Left Ear: External ear normal    Nose: Nose normal    Mouth/Throat: Oropharynx is clear and moist    Eyes: Conjunctivae and EOM are normal  Pupils are equal, round, and reactive to light  Neck: Normal range of motion  Cardiovascular: Normal rate, regular rhythm and normal heart sounds  Pulmonary/Chest: Effort normal and breath sounds normal    Abdominal: Soft  Bowel sounds are normal    Musculoskeletal: Normal range of motion  Neurological: She is alert and oriented to person, place, and time  Skin:   Abscess packing change x 2   Both with yellow d/c  Both with decreased pain and swelling and erythema    Psychiatric: She has a normal mood and affect  Her behavior is normal  Judgment and thought content normal    Nursing note and vitals reviewed        Vital Signs  ED Triage Vitals [08/18/18 1252]   Temperature Pulse Respirations Blood Pressure SpO2   97 5 °F (36 4 °C) 85 16 124/74 99 %      Temp src Heart Rate Source Patient Position - Orthostatic VS BP Location FiO2 (%)   -- -- -- -- --      Pain Score       --           Vitals:    08/18/18 1252   BP: 124/74   Pulse: 85       Visual Acuity      ED Medications  Medications - No data to display    Diagnostic Studies  Results Reviewed     None                 No orders to display              Procedures  Procedures       Phone Contacts  ED Phone Contact    ED Course                               [facility]  CritCare Time    Disposition  Final diagnoses:   Wound check, abscess Time reflects when diagnosis was documented in both MDM as applicable and the Disposition within this note     Time User Action Codes Description Comment    8/18/2018  1:18 PM Dinora Morris, Bianka[place] [Z51 [id number]] Wound check, abscess       ED Disposition     ED Disposition Condition Comment    Discharge  Carmen Alvarez discharge to home/self care  Condition at discharge: Good        Follow-up Information     Follow up With Specialties Details Why [address], [address] Medicine  wound care center [address] [facility] [address], [address] [place] [id number] [phone]          Discharge Medication List as of 8/18/2018  1:19 PM      CONTINUE these medications which have NOT CHANGED    Details   albuterol (PROVENTIL HFA,VENTOLIN HFA) 90 mcg/act inhaler Inhale 2 puffs every 4 (four) hours as needed for wheezing, Starting Tue 8/15/2017, Print      cephalexin (KEFLEX) 500 mg capsule Take 1 capsule (500 mg total) by mouth every 8 (eight) hours for 10 days, Starting Thu 8/16/2018, Until Sun 8/26/2018, Print      ibuprofen (MOTRIN) 600 mg tablet Take 1 tablet (600 mg total) by mouth every 6 (six) hours as needed (pain), Starting Thu 8/16/2018, Print      sulfamethoxazole-trimethoprim (BACTRIM DS) 800-160 mg per tablet Take 1 tablet by mouth 2 (two) times a day for 7 days smx-tmp DS (BACTRIM) 800-160 mg tabs (1tab q12 D10), Starting Thu 8/16/2018, Until Thu 8/23/2018, Print           No discharge procedures on file      ED Provider  Electronically Signed by           Alondra Bean PA-C  08/19/18 5105

## 2020-06-16 NOTE — PROGRESS NOTES
[facility] Progress Note Patient: Osvaldo Alonso Age: 80 y.o. : 1929 MR#: [id number] SSN: [id number] Date/Time: 2020 Subjective: Indeterminate for Toxigenic C. difficile, DNA confirmation to follow. DNA collected 20. Yesterday in conversation with palliative care wife changed goals of care to DNR/DNI. POST form signed by wife for DNR/DNI limited interventions, no feeding tubes. Overnight, awake with 2-3 hours of sleep during the night. Patient picking at dressings and at murcia, mitts on hands for protection, two stool eliminated. Continued poor po intake, taking sips of juice with meds only. This am, PO4 1.6. Swallow eval completed at bedside this am with recs of dental soft solids/ thin liquids, aspiration precautions. Patient sleeps through most of interview and exam, only opens eyes briefly, not answering questions or following commands. Nsg called late am requesting restraints, mitts not working. C diff resulted as positive. Assessment/Plan:  
Patient with fall and L fracture 1. Acute on chronic L1 fracture from fall 2. 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 4. Hypothyroid on synthroid. 5. Severe osteoporosis 6. Dyslipidemia on statin. 7. Urinary retention - S/p Murcia cath with 500 ml urine out put - on flomax. 8. C diff - po vanco x 10 days. 9. Underweight Body mass index is 17.43 kg/m². calorie count initiated. Nutritionist following. On supplements, multivit. 10. Hypophosphatemia replete as needed. 11. Agitation, reversed sleep wake cycle -  ms 2020 - seroquel daily, remeron qHS. Stopped zyprexa. 12. Avoid chemical dvt prophylaxis 13. DNR / DNI - POST form completed - palliative care input appreciated. PT recs SNF and RW. Hospice consult initiated. I discussed the case with EDITH Jang. Case discussed with:  [x]Patient  [] Family/ wife  ( In room with patient )    [x]Nursing  [x]Case Management DVT Prophylaxis:  []Lovenox  []Hep SQ  [x]SCDs  []Coumadin   []On Heparin gtt Objective:  
VS:  
Visit Vitals /55 (BP 1 Location: Right arm, BP Patient Position: At rest) Pulse 74 Temp 97.2 °F (36.2 °C) Resp 16 Ht 5' 9\" (1.753 m) Wt 53.5 kg (118 lb) SpO2 96% BMI 17.43 kg/m² Tmax/24hrs: Temp (24hrs), Av.6 °F (36.4 °C), Min:97 °F (36.1 °C), Max:99 °F (37.2 °C) IOBRIEF Intake/Output Summary (Last 24 hours) at 2020 5640 Last data filed at 2020 9295 Gross per 24 hour Intake  Output 300 ml Net -300 ml General: asleep. Opens eyes briefly. Not answering questions, not following commands. Cardiovascular: S1S2 - regular , No Murmur Pulmonary: Equal expansion , No Use of accessory muscles , No Rales No Rhonchi GI:  soft, non-tender, nd nabs. Extremities:  No edema; 2+ dorsalis pedis pulses bilaterally Neuro: asleep, not answering questions, not following commands Medications:  
Current Facility-Administered Medications Medication Dose Route Frequency  arformoteroL (BROVANA) neb solution 15 mcg  15 mcg Nebulization BID RT  
 budesonide (PULMICORT) 500 mcg/2 ml nebulizer suspension  500 mcg Nebulization BID RT  
 tamsulosin (FLOMAX) capsule 0.4 mg  0.4 mg Oral QHS  loperamide (IMODIUM) capsule 2 mg  2 mg Oral Q4H PRN  
 metroNIDAZOLE (FLAGYL) tablet 500 mg  500 mg Oral TID  acetaminophen (TYLENOL) tablet 650 mg  650 mg Oral Q4H PRN  
 albuterol-ipratropium (DUO-NEB) 2.5 MG-0.5 MG/3 ML  3 mL Nebulization Q4H PRN  
 [START ON 2020] alendronate (FOSAMAX) tablet 70 mg  70 mg Oral every Thursday  levothyroxine (SYNTHROID) tablet 25 mcg  25 mcg Oral 6am  
 therapeutic multivitamin (THERAGRAN) tablet 1 Tab  1 Tab Oral DAILY  atorvastatin (LIPITOR) tablet 10 mg  10 mg Oral QHS  HYDROcodone-acetaminophen (NORCO) 5-325 mg per tablet 1 Tab  1 Tab Oral Q4H PRN  
 ketorolac (TORADOL) injection 15 mg  15 mg IntraVENous Q6H PRN  
 ipratropium (ATROVENT) 0.02 % nebulizer solution 0.5 mg  0.5 mg Nebulization Q6H RT  
 OLANZapine (ZyPREXA) tablet 2.5 mg  2.5 mg Oral QPM  
 LORazepam (ATIVAN) tablet 1 mg  1 mg Oral Q8H PRN Labs:   
Recent Labs  
  06/16/20 
0327 WBC 10.8 HGB 14.1 HCT 41.4  Recent Labs  
  06/16/20 
0327 06/15/20 
0416   --   
K 3.5  --   
  --   
CO2 33*  --   
*  --   
BUN 38*  --   
CREA 1.03  --   
CA 8.5  --   
MG 2.3  --   
PHOS 1.6*  --   
INR  --  1.4* Disclaimer: Sections of this note are dictated utilizing voice recognition software, which may have resulted in some phonetic based errors in grammar and contents. Even though attempts were made to correct all the mistakes, some may have been missed, and remained in the body of the document. If questions arise, please contact our department. Signed By: Preston Antonio MD   
 June 16, 2020

## 2020-06-16 NOTE — PROGRESS NOTES
Problem: Risk for Spread of Infection Goal: Prevent transmission of infectious organism to others Description: Prevent the transmission of infectious organisms to other patients, staff members, and visitors. Outcome: Progressing Towards Goal 
  
Problem: Patient Education:  Go to Education Activity Goal: Patient/Family Education Outcome: Progressing Towards Goal 
  
Problem: Pressure Injury - Risk of 
Goal: *Prevention of pressure injury Description: Document Anthony Scale and appropriate interventions in the flowsheet. Outcome: Progressing Towards Goal 
Note: Pressure Injury Interventions: 
Sensory Interventions: Keep linens dry and wrinkle-free Moisture Interventions: Absorbent underpads Activity Interventions: Pressure redistribution bed/mattress(bed type) Mobility Interventions: Pressure redistribution bed/mattress (bed type) Nutrition Interventions: Document food/fluid/supplement intake Friction and Shear Interventions: Foam dressings/transparent film/skin sealants Problem: Patient Education: Go to Patient Education Activity Goal: Patient/Family Education Outcome: Progressing Towards Goal 
  
Problem: Falls - Risk of 
Goal: *Absence of Falls Description: Document Earma Kary Fall Risk and appropriate interventions in the flowsheet. Outcome: Progressing Towards Goal 
Note: Fall Risk Interventions: 
Mobility Interventions: Strengthening exercises (ROM-active/passive) Mentation Interventions: Bed/chair exit alarm Medication Interventions: Bed/chair exit alarm Elimination Interventions: Bed/chair exit alarm History of Falls Interventions: Room close to nurse's station Problem: Patient Education: Go to Patient Education Activity Goal: Patient/Family Education Outcome: Progressing Towards Goal 
  
Problem: Nutrition Deficit Goal: *Optimize nutritional status Outcome: Progressing Towards Goal 
  
Problem: Pain Goal: *Control of Pain Outcome: Progressing Towards Goal 
Goal: *PALLIATIVE CARE:  Alleviation of Pain Outcome: Progressing Towards Goal 
  
Problem: Patient Education: Go to Patient Education Activity Goal: Patient/Family Education Outcome: Progressing Towards Goal 
  
Problem: Patient Education: Go to Patient Education Activity Goal: Patient/Family Education Outcome: Progressing Towards Goal 
  
Problem: Patient Education: Go to Patient Education Activity Goal: Patient/Family Education Outcome: Progressing Towards Goal

## 2020-06-16 NOTE — PROGRESS NOTES
NUTRITION Nursing Referral: [facility] Nutrition Consult: Other (calorie count) RECOMMENDATIONS / PLAN:  
 
- Continue Ensure Enlive BID & add [employer], once daily. 
- Start 3 day calorie count (6/16-6/18). - Monitor/ encourage po intake of meals/supplements 
- Continue RD inpatient monitoring and evaluation. NUTRITION INTERVENTIONS & DIAGNOSIS:  
 
- Meals/snacks: modified composition - Medical food supplement therapy: modify - Collaboration and referral of nutrition care: calorie count discussed with nursing & Dr. Kelly Fraction Nutrition Diagnosis: Inadequate energy intake related to decreased appetite and mentation as evidenced by pt with poor intake of recent meals ASSESSMENT:  
 
6/16: Poor intake, remains confused. Downgraded to soft solids today per SLP. Unable to complete NFPE at this time pt isolation status for C Diff rule-out. POST form includes no feeding tube. Oral Phos started today. Calorie count to be started per MD request. 
 
6/12: Pt with cognitive impairment and hearing difficulty; spoke with wife. Has good appetite and meal intake PTA, eats small meals. Preferences discussed; although, pt is not a picky eater per wife report. Tolerating regular consistency diet at home. Needs assistance with meal set up. Unsure of po intake at lunch time today; was NPO at breakfast today. Agreeable with nutrition supplement; will modify, change to Ensure Enlive to provide more energy and protein. Nutritional intake adequate to meet patients estimated nutritional needs:  No 
 
Diet: DIET NUTRITIONAL SUPPLEMENTS Breakfast, Dinner; ENSURE ENLIVE 
DIET DENTAL SOFT (SOFT SOLID) Food Allergies: NKFA Current Appetite: Poor Appetite/meal intake prior to admission: good; eats small meals; not a picky eater Feeding Limitations:  [x] Swallowing difficulty: SLP following    [] Chewing difficulty    [] Other: 
Current Meal Intake:  
Patient Vitals for the past 100 hrs: 
 % Diet Eaten 06/14/20 1726 50 % 06/14/20 1230 0 % 06/14/20 0922 50 % BM: 6/15 Skin Integrity: scab & skin tear to left lower arm, stage 1 pressure injury to unknown location, skin tear to left calf, left pretibial & bilateral hand Edema:   [x] No     [] Yes Pertinent Medications: Reviewed: atorvastatin, levothyroxine, pen loperamide, antibiotic therapy, K Phos Neutral, theragran Recent Labs  
  06/16/20 
0327   
K 3.5  CO2 33* * BUN 38* CREA 1.03  
CA 8.5 MG 2.3 PHOS 1.6* Intake/Output Summary (Last 24 hours) at 6/16/2020 0901 Last data filed at 6/16/2020 0549 Gross per 24 hour Intake  Output 300 ml Net -300 ml Anthropometrics: 
Ht Readings from Last 1 Encounters:  
06/12/20 5' 9\" (1.753 m) Last 3 Recorded Weights in this Encounter 06/12/20 1638 Weight: 53.5 kg (118 lb) Body mass index is 17.43 kg/m². Underweight Weight History:  Per wife report, pt has been losing weight over the past several years. Net wt loss of 24 lb, 16.6% x 8 years and 4 months PTA per chart hx 
 
Weight Metrics 6/12/2020 3/6/2020 8/16/2019 5/10/2019 4/18/2014 10/10/2013 4/8/2013 Weight 118 lb 121 lb 125 lb 123 lb 130 lb 132 lb 134 lb BMI 17.43 kg/m2 17.87 kg/m2 19.01 kg/m2 18.7 kg/m2 19.77 kg/m2 20.08 kg/m2 20.38 kg/m2 Admitting Diagnosis: Closed compression fracture of L3 vertebra ([facility] [address] [S32.030A] Spinal stenosis, lumbar [M48.061] Pertinent PMHx: heart disease, thyroid disease, malignant neoplasm of skin of face; CABG Education Needs:        [x] None identified  [] Identified - Not appropriate at this time  []  Identified and addressed - refer to education log Learning Limitations:   [] None identified  [x] Identified: bilateral [place]iat[place], some cognitive impairment; confusion Cultural, Spiritism & ethnic food preferences:  [x] None identified    [] Identified and addressed ESTIMATED NUTRITION NEEDS:  
 
 Calories: 3607-4525 kcal (30-35 kcal/kg) based on  [x] Actual BW: 54 kg      [] IBW Protein: 54-65 gm (1-1.2 gm/kg) based on  [x] Actual BW      [] IBW Fluid: 1 mL/kcal 
  
MONITORING & EVALUATION:  
 
Nutrition Goal(s): goals modified - PO nutrition intake will meet >75% of patient estimated nutritional needs within the next 7 days. Outcome: Progressing towards goal   
- Provide nutrition intervention as appropriate with goals of care for the next 7 days. Outcome: New/Initial goal   
 
Monitoring:   [x] Food and nutrient intake   [x] Food and nutrient administration  [x] Comparative standards   [x] Nutrition-focused physical findings   [x] Anthropometric Measurements   [x] Treatment/therapy   [x] Biochemical data, medical tests, and procedures Previous Recommendations (for follow-up assessments only): Implemented Discharge Planning: oral diet as tolerated per SLP with Ensure Enlive or comparable supplement 2-3x daily as needed Participated in care planning, discharge planning, & interdisciplinary rounds as appropriate. Tito Baldwin RD Pager: [phone]

## 2020-06-16 NOTE — PROGRESS NOTES
Speech Pathology Note Swallow eval completed b/s with recs of dental soft solids/ thin liquids, aspiration precautions. Full report to follow. Thank you for this referral, 
Heath Avila MS, CCC/SLP Speech- Language Pathologist

## 2020-06-17 NOTE — PROGRESS NOTES
Progress Note Summary: 
Assumed care of patient in bed remains in restraints restless at periods and quiet @ times. Poor po intake, no s/s of acute distress, call bell within reach. Patient Vitals for the past 12 hrs: 
 Temp Pulse Resp BP SpO2  
06/17/20 0527 100 °F (37.8 °C) 83 16 96/53 93 % 06/17/20 0259     91 % 06/16/20 2350 99.8 °F (37.7 °C) 98 18 97/56 92 % 06/16/20 2157     91 % 06/16/20 1954 99.3 °F (37.4 °C) 94 16 98/47 92 %

## 2020-06-17 NOTE — CONSULTS
Palliative Medicine Consult [facility]: [phone] ([phone]) DUSTIN CHAMORRO [facility]: [phone] [facility]: [phone] Patient Name: Lorrayne Romberg YOB: 1929 Date of Initial Consult: 6/15/2020, follow up 6/16/2020, 6/17/2020 Reason for Consult: care decisions Requesting Provider: Wilfrido Farooq and Velvet Patel Primary Care Physician: Isaac Muller MD 
  
 SUMMARY:  
Lorrayne Romberg is a 80y.o. year old with a past history of atrial fib,hypertrophy of prostate with urinary obstruction due to hypertrophy of prostate, mild cognitive dysfunction , osteoporosis who was admitted on 6/12/2020 from home  with a diagnosis of fall about 2 weeks ago now with increased pain. Seen by otho, who suggested PT, non surgical approach. Palliative Medicine is consulted for goals of care. 6/17/2020 met with wife, hospice at bedside. Shared with wife at this point believe Mr Gonzalo Petit will be best serviced with hospice support at home. Highly encouraged her to explore hiring extra care givers for safety. DNR/DNI. 6/16/2020 seen x 2 today. This am responded to [facility], still confused. Agitated at times. Ate a bit of breakfast. Per RN not drinking. Met with wife this afternoon, He is C diff + wife aware. Not sure he is going to eat and drink enough to sustain himself for a long period of time. Wife does not want feeding tubes, I agree. Will place Hospice consult for information. Wife aware and agreeable PALLIATIVE DIAGNOSES:  
1. Goals of care 2. Closed compression fracture of lumber vertebra 3. Diarrhea 4. Delirium PLAN:  
1. 6/17/2020 Mr Melissa Mirza seen at bedside along with Ms Hayes Fregoso present. Wife also present. He remains confused, answers a few simple questions, but does not really follow any commands for me. Still tries to get of bed, but will re direct at times. He is not eating.  Per RN no stools today. Long conversation with wife, hospice. Appreciate hospice assistance, liaision explained their service/ benefits. Shared with Ms Marcus Blair, really feel at this juncture he will be best served with home hospice, and hired care givers. That would give him a chance to get home with her. Hospice will provide a few PT sessions, but I do not believe he will participate and shared this with wife. Stress and encourage the need to have extra care givers in the home for help. She wishes to consider the information. Difficult decision for this elderly wife. We offered support. Re address tomorrow with wife, hospice will also follow up. Goals of care DNR/DNI limited interventions for now, no feeding tubes. ( please see below for previous conversations) 2. 6/16/2020 seen x 2 today. He seems a bit more verbal to me then yesterday, however still confused now requiring soft restraints for safety. Per RN will take his meds, ate a bit of breakfast, doesn't drink fluids well. C diff + wife is aware. Per BSRN less episodes of loss stools. Spoke with wife again today. She is very worried about her , offered support. Honest but compassionate discussion, just not sure how he is going to do in the long term. I expressed my worry, concerned he will not eat and drink enough to sustain himself in the long term. Despite his functional level at home, worry he will no longer be able to obtain this level of functioning. Discussed placing a hospice consult for information of which she is agreeable with. PT/ ST on going. Will discuss choices with her tomorrow. Discussed with attending. Goals of care DNR/DNI, limited interventions, no feeding tubes 3. Goals of care Seen at bedside today along with Ms Cyndy Deyanira. Patient was not very responsive when seen this am, Seen again with wife present, remains confused wanting to get out of bed to use the restroom. He can name his wife, no conversation following with her or our team. Patient is not able to participate in his medical decisions. Spoke with his wife via phone and in person. Lengthy conversation on overall condition. She shares with us he has fallen in the past and required antibiotics for various infections but none sine December. Wife notes some cognitive dysfunction when at home, but able to attend to his own ADL's. She was able to bring us his AMD which was previously completed. Goals of care discussed at length including the benefits and burdens of resuscitation with advanced age. After thought and consideration wife changed goals of care to DNR/DNI. POST form introduced and discussed, signed by wife for DNR/DNI limited interventions, no feeding tubes. Patient and wife live together. They have 2 living children but both out of town and per wife have health issues of their own. If patient is not able to become more functional and ambulatory,wife will need assistance in caring for him. Discussed with CM. We did broach changing to a more comfort directed care plan if patient does not improve. We will continue to follow with you. 4. Acute on chronic fracture L1 ortho on board / non surgical approach. PT on board. Pain noted with rolling. Concerned about his functional level moving forward, did share this with his wife. 5. Diarrhea continue to have loose stools, wants to get up to use restroom. C diff pending 6. Delirium on Zyprexa 2.5 mg ? Contributing to his sleepiness. Was restless and trying to get of bed. Per wife mild cognitive dysfunction at baseline. 7. Initial consult note routed to primary continuity provider 8. Communicated plan of care with: Palliative IDT, wife, RN, CM  
 
 
 
 
TREATMENT PREFERENCES:  
Code Status: DNR/DNI Advance Care Planning: 
[] The [facility] Interdisciplinary Team has updated the ACP Navigator with [address] and Patient Capacity Primary Decision [facility] ([address]):   Primary Decision Maker: Tammy Gibson - [phone] Primary Decision Maker: Ximena Olivares - Other Relative - [phone] Secondary Decision Maker: Hernesto Baldwin - Other Relative - [phone] Medical Interventions: Limited additional interventions Other Instructions: no feeding tubes Artificially Administered Nutrition: No feeding tube Other: As far as possible, the palliative care team has discussed with patient / health care proxy about goals of care / treatment preferences for patient. HISTORY:  
 
History obtained from: chart and wife CHIEF COMPLAINT: fall HPI/SUBJECTIVE: The patient is:  
[] Verbal and participatory [x] Non-participatory due to: confusion Please see summary Clinical Pain Assessment (nonverbal scale for nonverbal patients): Clinical Pain Assessment Severity: 0 Activity (Movement): Lying quietly, normal position Duration: for how long has pt been experiencing pain (e.g., 2 days, 1 month, years) Frequency: how often pain is an issue (e.g., several times per day, once every few days, constant) FUNCTIONAL ASSESSMENT:  
 
Palliative Performance Scale (PPS): PPS: 30 
 
ECOG 
ECOG Status : Completely disabled PSYCHOSOCIAL/SPIRITUAL SCREENING:  
  
Any spiritual / Taoist concerns: unable to assess for patient  
[] Yes /  [] No 
 
Caregiver Burnout: 
[] Yes /  [x] No /  [] No Caregiver Present Anticipatory grief assessment:  Unable to assess for patient  
[] Normal  / [] Maladaptive REVIEW OF SYSTEMS:  
 
Positive and pertinent negative findings in ROS are noted above in HPI. The following systems were [] reviewed / [x] unable to be reviewed as noted in HPI Other findings are noted below.  
Systems: constitutional, ears/nose/mouth/throat, respiratory, gastrointestinal, genitourinary, musculoskeletal, integumentary, neurologic, psychiatric, endocrine. Positive findings noted below. Modified ESAS Completed by: provider Pain: 0 Dyspnea: 0 Stool Occurrence(s): 1 PHYSICAL EXAM:  
 
Wt Readings from Last 3 Encounters:  
06/12/20 53.5 kg (118 lb) 03/06/20 54.9 kg (121 lb) 08/16/19 56.7 kg (125 lb) Blood pressure 128/67, pulse 99, temperature (!) 100.8 °F (38.2 °C), resp. rate 16, height 5' 9\" (1.753 m), weight 53.5 kg (118 lb), SpO2 91 %. Pain: 
Pain Scale 1: PAINAD (Advanced Dementia) Pain Intensity 1: 0 Pain Location 1: Back Pain Description 1: Throbbing Pain Intervention(s) 1: Medication (see MAR) Last bowel movement: none today Constitutional: remains very confused, will alert to his name, answer some very simple questions for me. In NAD Respiratory: breathing not labored Musculoskeletal:  on right side Skin: warm, dry Neurologic: oriented to himself, he will alert, confused tries to get of bed. 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 Did not follow commands for me HISTORY:  
 
Active Problems: 
  Spinal stenosis, lumbar (6/12/2020) Closed compression fracture of L3 vertebra ([address] (6/12/2020) Past Medical History:  
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  
Procedure Laterality Date  HX CORONARY ARTERY BYPASS GRAFT  3/12  HX HERNIA REPAIR    
 HX HIP REPLACEMENT  '60 & 06  
 x 2  
 HX [facility]  [facility] Family History Problem Relation Age of Onset  Cancer Mother  Emphysema Father History reviewed, no pertinent family history. Social History Tobacco Use  Smoking status: Former Smoker Last attempt to quit: 1990 Years since quittin.4  Smokeless tobacco: Never Used Substance Use Topics  Alcohol use: Yes Comment: social  
 
No Known Allergies Current Facility-Administered Medications Medication Dose Route Frequency  arformoteroL (BROVANA) neb solution 15 mcg  15 mcg Nebulization BID RT  
 budesonide (PULMICORT) 500 mcg/2 ml nebulizer suspension  500 mcg Nebulization BID RT  
 phosphorus (K PHOS NEUTRAL) 250 mg tablet 1 Tab  1 Tab Oral BID  QUEtiapine (SEROquel) tablet 12.5 mg  12.5 mg Oral DAILY  mirtazapine (REMERON SOL-TAB) disintegrating tablet 15 mg  15 mg Oral QHS  vancomycin (FIRVANQ) 50 mg/mL oral solution 125 mg  125 mg Oral Q6H  
 tamsulosin (FLOMAX) capsule 0.4 mg  0.4 mg Oral QHS  loperamide (IMODIUM) capsule 2 mg  2 mg Oral Q4H PRN  
 acetaminophen (TYLENOL) tablet 650 mg  650 mg Oral Q4H PRN  
 albuterol-ipratropium (DUO-NEB) 2.5 MG-0.5 MG/3 ML  3 mL Nebulization Q4H PRN  
 [START ON 2020] alendronate (FOSAMAX) tablet 70 mg  70 mg Oral every Thursday  levothyroxine (SYNTHROID) tablet 25 mcg  25 mcg Oral 6am  
 therapeutic multivitamin (THERAGRAN) tablet 1 Tab  1 Tab Oral DAILY  atorvastatin (LIPITOR) tablet 10 mg  10 mg Oral QHS  
 HYDROcodone-acetaminophen (NORCO) 5-325 mg per tablet 1 Tab  1 Tab Oral Q4H PRN  
 ipratropium (ATROVENT) 0.02 % nebulizer solution 0.5 mg  0.5 mg Nebulization Q6H RT  
  LORazepam (ATIVAN) tablet 1 mg  1 mg Oral Q8H PRN  
 
 
 LAB AND IMAGING FINDINGS:  
 
Lab Results Component Value Date/Time WBC 10.8 06/16/2020 03:27 AM  
 HGB 14.1 06/16/2020 03:27 AM  
 PLATELET 200 14/76/8595 03:27 AM  
 
Lab Results Component Value Date/Time Sodium 140 06/16/2020 03:27 AM  
 Potassium 3.5 06/16/2020 03:27 AM  
 Chloride 103 06/16/2020 03:27 AM  
 CO2 33 (H) 06/16/2020 03:27 AM  
 BUN 38 (H) 06/16/2020 03:27 AM  
 Creatinine 1.03 06/16/2020 03:27 AM  
 Calcium 8.5 06/16/2020 03:27 AM  
 Magnesium 2.3 06/16/2020 03:27 AM  
 Phosphorus 1.6 (L) 06/16/2020 03:27 AM  
  
Lab Results Component Value Date/Time Alk. phosphatase 193 (H) 06/12/2020 01:38 AM  
 Protein, total 7.2 06/12/2020 01:38 AM  
 Albumin 3.7 06/12/2020 01:38 AM  
 Globulin 3.5 06/12/2020 01:38 AM  
 
Lab Results Component Value Date/Time INR 1.4 (H) 06/15/2020 04:16 AM  
 Prothrombin time 16.5 (H) 06/15/2020 04:16 AM  
  
No results found for: IRON, FE, TIBC, IBCT, PSAT, FERR No results found for: PH, PCO2, PO2 No components found for: [place] Lab Results Component Value Date/Time  06/12/2020 01:38 AM  
 CK - MB 4.2 (H) 06/12/2020 01:38 AM  
  
 
   
 
Total time: 35 minutes Counseling / coordination time, spent as noted above: 25 minutes  
> 50% counseling / coordination: yes with wife, CM, hospice Prolonged service was provided for  []30 min   []75 min in face to face time in the presence of the patient, spent as noted above. Time Start:   
Note: this can only be billed with 88024 (initial) or 96524 (follow up). If multiple start / stop times, list each separately.

## 2020-06-17 NOTE — PROGRESS NOTES
Problem: Mobility Impaired (Adult and Pediatric) Goal: *Acute Goals and Plan of Care (Insert Text) Description: Physical Therapy Goals Initiated 6/15/2020 and to be accomplished within 7 day(s) 1. Patient will move from supine to sit and sit to supine , scoot up and down and roll side to side in bed with moderate assistance . 2.  Patient will transfer from bed to chair and chair to bed with moderate assistance  using the least restrictive device. 3.  Patient will perform sit to stand with moderate assistance . 4. Patient will ambulate with moderate assistance  for 10 feet with the least restrictive device. PLOF: Pt confused throughout session, unable to provide history. Outcome: Progressing Towards Goal 
 PHYSICAL THERAPY TREATMENT Patient: Nellie Gallagher (75 y.o. male) Date: 6/17/2020 Diagnosis: Closed compression fracture of L3 vertebra ([facility] [address] [S32.030A] Spinal stenosis, lumbar [M48.061] <principal problem not specified> Precautions:   
= 
 
ASSESSMENT: 
Pt found supine HOB elevated initially willing to participate w/ therapy, however treatment greatly limited as pt has great difficulty following commands as alertness is decreased. Tx performed w/ OT to maximize safety of pt and staff as well as to facilitate balance and stability during mobility and self care tasks. Mult attempts made to increase alertness prior to mobility w/ minimal response. Pt would answer some questions w/ yes, however unable to perform mobility tasks in supine when asked. Pt transferred to a sitting position, req max to total A X2. W/ correct positioning, pt req constant support and cues to prevent post lean and maintain proper hand positioning. Fatigued and irritability increased, thus assisted back to a supine position. PROM in (B) LEs once in safe position. Pt displays intact gross PROM in all joints, only lacking IR of (B) hips (right > left). Pt also endorses greater pain in right LE PROM. Pt positioned comfortably w/ Prevlon boots and (B) soft wrist restraints applied prior to leaving pt in room. Nursing notified of position and active bed alarm. Progression toward goals: slow 
[]      Improving appropriately and progressing toward goals [x]      Improving slowly and progressing toward goals 
[]      Not making progress toward goals and plan of care will be adjusted PLAN: 
Patient continues to benefit from skilled intervention to address the above impairments. Continue treatment per established plan of care. Discharge Recommendations:  LTC Further Equipment Recommendations for Discharge: Hospital bed, WC (18 in) SUBJECTIVE:  
Patient stated Yes.  OBJECTIVE DATA SUMMARY:  
Critical Behavior: 
Neurologic State: Appropriate for age, Confused Orientation Level: Disoriented to person, Disoriented to place, Disoriented to situation, Disoriented to time Cognition: Follows commands Safety/Judgement: Fall prevention Functional Mobility Training: 
Bed Mobility: 
Rolling: Maximum assistance; Total assistance;Assist x2 Supine to Sit: Maximum assistance; Total assistance;Assist x2 Sit to Supine: Total assistance;Assist x2 Scooting: Total assistance;Assist x2 Balance: 
Sitting: Impaired; With support Sitting - Static: Poor (constant support) Sitting - Dynamic: Poor (constant support) Pain: 
Unable to determine. Did express elevated pain w/ PROM of right UE and LE Activity Tolerance:  
Poor Please refer to the flowsheet for vital signs taken during this treatment. After treatment:  
[] Patient left in no apparent distress sitting up in chair 
[x] Patient left in no apparent distress in bed 
[x] Call bell left within reach [x] Nursing notified 
[] Caregiver present [x] Bed alarm activated 
[] SCDs applied COMMUNICATION/EDUCATION:  
[x]         Role of Physical Therapy in the acute care setting.  
[x]         Fall prevention education was provided and the patient/caregiver indicated understanding. [x]         Patient/family have participated as able in working toward goals and plan of care. [x]         Patient/family agree to work toward stated goals and plan of care. []         Patient understands intent and goals of therapy, but is neutral about his/her participation. []         Patient is unable to participate in stated goals/plan of care: ongoing with therapy staff. 
[]         Other: 
 
   
Dima Sans, PTA Time Calculation: 23 mins

## 2020-06-17 NOTE — HOSPICE
Spoke with Brenda Summers, pt's wife [facility], and [facility]. Discussed caregiver need for round the clock care with Christina Robb 
primary caregiver identified as Christina Robb Caregiver concerns identified as wife unable to provide physical care due to own health concerns. Pt is retired [employer]. Information sent to [place]. Wife also calling to file LTC policy today. Collaborative meeting done with Eugene Zarate and palliative team.  Wife still undecided by asked if pt would be able to get PT when he comes home. Explained PT could be ordered for a few sessions for safety. Will follow up in the morning with wife, order DME, and plan discharge if she decides to go with hospice. Answered all questions. Gave brochure with 24/7 contact information. Thank you for the referral to [facility]. If we can be of further assistance please contact [phone]. Gilma Sharma RN [place] [id number] [place], [address] [place] [address] Str. 
[phone] Email: [email or web address]

## 2020-06-17 NOTE — PROGRESS NOTES
Met with patient and wife at bedside yesterday afternoon. Wife states she has been trying to work with the [place] about seeing if they will cover any home care needs; states she's \"not getting too far\". Told her I would get her a list of home care agencies but that it would be private pay as pt does not qualify for Medicaid. Will bring up topic of LTC placement at hospice meeting today at [facility]  [phone]

## 2020-06-17 NOTE — PROGRESS NOTES
Bedside and Verbal shift change report given to Felice Alcaraz RN (oncoming nurse) by Jurgen Dove RN (offgoing nurse). Report included the following information SBAR and Kardex.

## 2020-06-17 NOTE — CDMP QUERY
Patient admitted with acute on chronic L1 fx. Patient noted to have \"some degree of cognitive impairment\" per H&P. (Steroids and Zyprexa stropped). If possible, please document in the progress notes and d/c summary if you are evaluating and / or treating any of the following: 
 
 Early dementia with behavioral disturbance  Toxic Encephalopathy  Metabolic Encephalopathy  Encephalopathy due to medications or drugs (please specify)  Other, please specify  Clinically unable to determine The medical record reflects the following: 
   Risk Factors: Elderly male; acute/chronic illness; steroids; Cdiff Clinical Indicators: H&P-some degree of cognitive impairment 6/12  Note: patient was started on high dose of steroids - patient developed severe psychosis - requiring sitter and restrains  Will be discontinued 6/16  Note: Agitation, reversed sleep wake cycle Palliative note: Delirium on Zyprexa 2.5 mg ? Contributing to his sleepiness. Was restless and trying to get of bed. Per wife mild cognitive dysfunction at baseline. Treatment:  Sitter; restraints; medication adjustment-stopped steroids and Zyprexa Thank you, Danyelle Loaiza [email or web address]

## 2020-06-17 NOTE — PROGRESS NOTES
[facility] Progress Note Patient: Syd Gomez Age: 80 y.o. : 1929 MR#: [id number] SSN: [id number] Date/Time: 2020 Subjective:  
 
 
tcurrent 100.8. Patient per report rested better overnight, minimal stool output overnight. Patient more alert with slight improvement in po intake this am. Out of restraints this am.  
 
Midday noted drowsy however. Hospice meeting planned for 2 pm today. Assessment/Plan:  
Patient with fall and L fracture 1. Acute on chronic L1 fracture from fall 2. 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 4. Hypothyroid on synthroid. 5. Severe osteoporosis 6. Dyslipidemia on statin. 7. Urinary retention - S/p Nuñez cath with 500 ml urine out put - on flomax. 8. C diff - po vanco x 10 days. 9. Underweight Body mass index is 17.43 kg/m². calorie count initiated. Nutritionist following. On supplements, multivit. 10. Hypophosphatemia replete as needed. 11. 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 Stopped zyprexa. Treat underlying causes. Maximize nutrition. 12. Avoid chemical dvt prophylaxis 13. DNR / DNI - POST form completed - palliative care input appreciated. PT recs SNF and RW. Hospice meeting this afternoon 2 pm.  
 
 
Case discussed with:  [x]Patient  [] Family/ wife   [x]Nursing  [x]Case Management DVT Prophylaxis:  []Lovenox  []Hep SQ  [x]SCDs  []Coumadin   []On Heparin gtt Objective:  
VS:  
Visit Vitals /67 (BP 1 Location: Right arm, BP Patient Position: At rest) Pulse 99 Temp (!) 100.8 °F (38.2 °C) Resp 16 Ht 5' 9\" (1.753 m) Wt 53.5 kg (118 lb) SpO2 91% BMI 17.43 kg/m² Tmax/24hrs: Temp (24hrs), Av.9 °F (37.2 °C), Min:97.2 °F (36.2 °C), Max:100.8 °F (38.2 °C) IOBRIEF 
 
 Intake/Output Summary (Last 24 hours) at 6/17/2020 5174 Last data filed at 6/17/2020 9094 Gross per 24 hour Intake 120 ml Output 500 ml Net -380 ml General: found propped up in bed, nurse feeding him. Looks at speaker, states Shyann Rosado are you doing today? \" Cardiovascular: S1S2 - regular , No Murmur Pulmonary: Equal expansion , No Use of accessory muscles , No Rales No Rhonchi GI:  soft, non-tender, nd nabs. Extremities:  No edema; 2+ dorsalis pedis pulses bilaterally Neuro: asleep, not answering questions, not following commands Medications:  
Current Facility-Administered Medications Medication Dose Route Frequency  arformoteroL (BROVANA) neb solution 15 mcg  15 mcg Nebulization BID RT  
 budesonide (PULMICORT) 500 mcg/2 ml nebulizer suspension  500 mcg Nebulization BID RT  
 phosphorus (K PHOS NEUTRAL) 250 mg tablet 1 Tab  1 Tab Oral BID  QUEtiapine (SEROquel) tablet 12.5 mg  12.5 mg Oral DAILY  mirtazapine (REMERON SOL-TAB) disintegrating tablet 15 mg  15 mg Oral QHS  vancomycin (FIRVANQ) 50 mg/mL oral solution 125 mg  125 mg Oral Q6H  
 tamsulosin (FLOMAX) capsule 0.4 mg  0.4 mg Oral QHS  loperamide (IMODIUM) capsule 2 mg  2 mg Oral Q4H PRN  
 acetaminophen (TYLENOL) tablet 650 mg  650 mg Oral Q4H PRN  
 albuterol-ipratropium (DUO-NEB) 2.5 MG-0.5 MG/3 ML  3 mL Nebulization Q4H PRN  
 [START ON 6/18/2020] alendronate (FOSAMAX) tablet 70 mg  70 mg Oral every Thursday  levothyroxine (SYNTHROID) tablet 25 mcg  25 mcg Oral 6am  
 therapeutic multivitamin (THERAGRAN) tablet 1 Tab  1 Tab Oral DAILY  atorvastatin (LIPITOR) tablet 10 mg  10 mg Oral QHS  
 HYDROcodone-acetaminophen (NORCO) 5-325 mg per tablet 1 Tab  1 Tab Oral Q4H PRN  
 ketorolac (TORADOL) injection 15 mg  15 mg IntraVENous Q6H PRN  
 ipratropium (ATROVENT) 0.02 % nebulizer solution 0.5 mg  0.5 mg Nebulization Q6H RT  
 LORazepam (ATIVAN) tablet 1 mg  1 mg Oral Q8H PRN Labs:   
Recent Labs 06/16/20 0327 WBC 10.8 HGB 14.1 HCT 41.4  Recent Labs  
  06/16/20 0327 06/15/20 
0416   --   
K 3.5  --   
  --   
CO2 33*  --   
*  --   
BUN 38*  --   
CREA 1.03  --   
CA 8.5  --   
MG 2.3  --   
PHOS 1.6*  --   
INR  --  1.4* Disclaimer: Sections of this note are dictated utilizing voice recognition software, which may have resulted in some phonetic based errors in grammar and contents. Even though attempts were made to correct all the mistakes, some may have been missed, and remained in the body of the document. If questions arise, please contact our department. Signed By: Amber Velazco MD   
 June 17, 2020

## 2020-06-17 NOTE — PROGRESS NOTES
Problem: Self Care Deficits Care Plan (Adult) Goal: *Acute Goals and Plan of Care (Insert Text) Description: Occupational Therapy Goals Initiated 6/17/2020 within 7 day(s). 1.  Patient will follow 2/3 commands during functional activities with VCs.  
2.  Patient will maneuver to the EOB with max assist in prep for self care. 3.  Patient will participate in upper extremity therapeutic exercise/activities with moderate assistance  for 8 minutes to increase strength/endurance for ADLs. Prior Level of Function: Patient unable to provide PLOF. He inconsistently answers questions and follows commands 20% of the time. Outcome: Not Progressing Towards Goal 
 OCCUPATIONAL THERAPY EVALUATION Patient: Isaias Bentley (70 y.o. male) Date: 6/17/2020 Primary Diagnosis: Closed compression fracture of L3 vertebra ([facility] [address] [S32.030A] Spinal stenosis, lumbar [M48.061] Precautions:   Fall, Back(soft wrist restraints), enteric ASSESSMENT : 
Based on the objective data described below, the patient presents with decreased ADLs, decreased functional mobility and muscle weakness, complicated by confusion and lethargy. Patient difficult to arouse but responded to discomfort from ROM of his RUE. He is oriented to self only. Soft wrist restraints removed at start of session and replaced at end of session. Total assist given for supine to sit on EOB and for all functional tasks. He was observed to move his UEs spontaneously but did not follow commands. Constant support required for sitting balance. Patient able to use his UEs for support. He is appropriate for a 3 day trial of skilled OT to determine if he can actively participate and make functional gains to meet goals. Patient will benefit from skilled intervention to address the above impairments. Patient's rehabilitation potential is considered to be Fair Factors which may influence rehabilitation potential include:  
[]             None noted [x]             Mental ability/status [x]             Medical condition []             Home/family situation and support systems []             Safety awareness []             Pain tolerance/management 
[]             Other: PLAN : 
Recommendations and Planned Interventions:  
[x]               Self Care Training                  [x]      Therapeutic Activities [x]               Functional Mobility Training   []      Cognitive Retraining 
[x]               Therapeutic Exercises           [x]      Endurance Activities [x]               Balance Training                    []      Neuromuscular Re-Education []               Visual/Perceptual Training     [x]      Home Safety Training 
[x]               Patient Education                   [x]      Family Training/Education []               Other (comment): Frequency/Duration: Patient will be followed by occupational therapy 1-2 times per day/4-7 days per week to address goals. Discharge Recommendations: LTC vs home with 24/7 care Further Equipment Recommendations for Discharge: If returning home, hospital bed, farhan lift, wheelchair SUBJECTIVE:  
Patient stated Yes (when asked if he wanted to return to bed).  OBJECTIVE DATA SUMMARY:  
 
Past Medical History:  
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  
Procedure Laterality Date  HX CORONARY ARTERY BYPASS GRAFT  3/12  HX HERNIA REPAIR    
 HX HIP REPLACEMENT  '60 & '06  
 x 2  
 HX OTHER SURGICAL  10/9/12 [facility] Barriers to Learning/Limitations: yes;  altered mental status (i.e.Sedation, Confusion) Compensate with: visual, verbal, tactile, kinesthetic cues/model Home Situation:  
Home Situation Home Environment: Private residence One/Two Story Residence: One story Living Alone: No 
Support Systems: Spouse/Significant Other/Partner Patient Expects to be Discharged to[de-identified] Private residence Current DME Used/Available at Home: Walker 
[x]  Right hand dominant   []  Left hand dominant Cognitive/Behavioral Status: 
Neurologic State: Confused; Lethargic Orientation Level: Oriented to person;Disoriented to place; Disoriented to situation;Disoriented to time Cognition: Decreased command following Safety/Judgement: Decreased awareness of environment;Decreased awareness of need for assistance;Decreased insight into deficits; Fall prevention Skin: Bruising throughout BUEs Edema: None noted in UEs Vision/Perceptual:    
Acuity: (unable to assess; pt does not open his eyes) Coordination: BUE Fine Motor Skills-Upper: (unable to assess) Gross Motor Skills-Upper: Left Intact; Right Intact(through observation) Balance: 
Sitting: Impaired; With support Sitting - Static: Poor (constant support) Sitting - Dynamic: Poor (constant support) Strength: BUE Strength: (unable to accurately assess 2/2 poor command following) Tone & Sensation: BUE Tone: Normal 
Sensation: (intact to pain) Range of Motion: BUE 
AROM: Generally decreased, functional 
PROM: Within functional limits Functional Mobility and Transfers for ADLs: 
Bed Mobility: 
Rolling: Maximum assistance; Total assistance;Assist x2 
 Supine to Sit: Maximum assistance; Total assistance;Assist x2 Sit to Supine: Total assistance;Assist x2 Scooting: Total assistance;Assist x2 Transfers: Toilet Transfer : (NT; dependent) ADL Assessment:  
Feeding: Total assistance Oral Facial Hygiene/Grooming: Total assistance Bathing: Total assistance Upper Body Dressing: Total assistance Lower Body Dressing: Total assistance Toileting: Total assistance Pain: 
Pain level pre-treatment: 0/10 Pain level post-treatment: 0/10 Pain Intervention(s): NA 
Response to intervention: NA Activity Tolerance:  
Good Please refer to the flowsheet for vital signs taken during this treatment. After treatment:  
[] Patient left in no apparent distress sitting up in chair 
[x] Patient left in no apparent distress in bed 
[x] Call bell left within reach [x] Nursing notified 
[] Caregiver present 
[] Bed alarm activated COMMUNICATION/EDUCATION:  
[x] Role of Occupational Therapy in the acute care setting 
[x] Home safety education was provided and the patient/caregiver indicated understanding. [] Patient/family have participated as able in goal setting and plan of care. [] Patient/family agree to work toward stated goals and plan of care. [] Patient understands intent and goals of therapy, but is neutral about his/her participation. [x] Patient is unable to participate in goal setting and plan of care. Thank you for this referral. 
Irasema Olivares MS OTR/L Time Calculation: 23 mins Eval Complexity: History: HIGH Complexity : Extensive review of history including physical, cognitive and psychosocial history ; Examination: HIGH Complexity : 5 or more performance deficits relating to physical, cognitive , or psychosocial skils that result in activity limitations and / or participation restrictions;   
Decision Making:HIGH Complexity : Patient presents with comorbidities that affect occupational performance. Signifigant modification of tasks or assistance (eg, physical or verbal) with assessment (s) is necessary to enable patient to complete evaluation

## 2020-06-17 NOTE — PROGRESS NOTES
Speech Therapy Note: Follow up attempted. Could not progress with ST intervention because patient:   
 
[x]  Lethargic, unable to be alerted enough for safe PO intake 
[]  Refused participation []  Off the unit []  NPO for procedure 
[]  Other: SLP will re-attempt treatment later this day or as schedule permits. Abigail Esparza M.S., CCC-SLP Speech Language Pathologist

## 2020-06-17 NOTE — PALLIATIVE CARE
Hospice Guidelines, General Information A patient will be considered to have a life expectancy of six months or less if he/she meets the non-disease specific decline in clinical status guidelines described in Part I. Alternatively, the baseline non-disease specific guidelines described in Part II plus the applicable disease specific guidelines listed in the appendix will establish the necessary expectancy. Part I. Decline in clinical status guidelines Patients will be considered to have a life expectancy of six months or less if there is documented evidence of decline in clinical status based on the guidelines listed below. Since determination of decline presumes assessment of the patients status over time, it is essential that both baseline and follow-up determinations be reported where appropriate. Baseline data may be established on admission to hospice or by using existing information from records. Other clinical variables not on this list may support a six-month or less life expectancy. These should be documented in the clinical record. These changes in clinical variables apply to patients whose decline is not considered to be reversible. They are listed in order of their likelihood to predict poor survival, the most predictive first and the least predictive last. No specific number of variables must be met, but fewer of those listed first (more predictive) and more of those listed last (least predictive) would be expected to predict longevity of six months or less. 1. Progression of disease as documented by worsening clinical status, symptoms, signs and laboratory results A. Clinical Status 1) Recurrent or intractable infections such as pneumonia, sepsis or upper urinary tract. (C-Diff, current), Prior hx of UTI, cellulitis,  
 
2) Progressive inanition as documented by: 
 
a) Weight loss not due to reversible causes such as depression or use of diuretics  Body mass index is 17.43 kg/m². b) Decreasing anthropomorphic measurements (mid-arm circumference, abdominal girth), not due to reversible causes such as depression or use of diuretics c) Decreasing serum albumin or cholesterol 3) Dysphagia leading to recurrent aspiration and/or inadequate oral intake documented by decreasing food portion consumption. B. Symptoms 1) Dyspnea with increasing respiratory rate 2) Cough, intractable 3) Nausea/vomiting poorly responsive to treatment 4) Diarrhea, intractable 5) Pain requiring increasing doses of major analgesics more than briefly. C. Signs 1) Decline in systolic blood pressure to below 90 or progressive postural hypotension 2) Ascites 3) Venous, arterial or lymphatic obstruction due to local progression or metastatic disease 4) Edema 5) Pleural / pericardial effusion 6) Weakness 7) Change in level of consciousness D. Laboratory (When available. Lab testing is not required to establish hospice eligibility.) 1) Increasing pCO2 or decreasing pO2 or decreasing SaO2 2) Increasing calcium, creatinine or liver function studies 3) Increasing tumor markers (e.g. CEA, PSA) 4) Progressively decreasing or increasing serum sodium or increasing serum potassium 2. Decline in Karnofsky Performance Status (KPS) or Palliative Performance Score (PPS) from <70% due to progression of disease. 3. Increasing emergency room visits, hospitalizations, or physicians visits related to hospice primary diagnosis 4. Progressive decline in Functional Assessment Staging (FAST) for dementia (from ? 7A on the FAST) 5. Progression to dependence on assistance with additional activities of daily living (See Part II, Section 2) 6. Progressive stage 3-4 pressure ulcers in spite of optimal care Part II. Non-disease specific baseline guidelines (both of these should be met) 1. Physiologic impairment of functional status as demonstrated by: 
Karnofsky Performance Status (KPS) or Palliative Performance Score (PPS) <70%. Note that two of the disease specific guidelines (HIV Disease, Stroke and Coma) establish a lower qualifying KPS or PPS. 2. Dependence on assistance for two or more activities of daily living (ADLs) A. Feeding X 
B. Ambulation X 
C. Continence X 
D. Transfer X 
E. Bathing X 
F. Dressing X See appendix for disease specific guidelines to be used with these (Part II) baseline guidelines. The baseline guidelines do not independently qualify a patient for hospice coverage. Note: The word should in the disease specific guidelines means that on medical review the guideline so identified will be given great weight in making a coverage determination. It does not mean, however, that meeting the guideline is obligatory. Part III. Co-morbidities Although not the primary hospice diagnosis, the presence of disease such as the following, the severity of which is likely to contribute to a life expectancy of six months or less, should be considered in determining hospice eligibility. A. Chronic obstructive pulmonary disease (Cetilobular emphysema) B. Congestive heart failure (moderate mitral regurgitation and severe tricuspid regurgitation, Pulmonary artery pressure C. Ischemic heart disease D. Diabetes mellitus E. Neurologic disease (CVA, ALS, MS, Parkinsons) F. Renal failure G. Liver Disease H. Neoplasia I. Acquired immune deficiency syndrome J. Dementia X

## 2020-06-18 NOTE — PROGRESS NOTES
Is her [address] Hospitalist Group Progress Note Patient: Gilda Flanagan Age: 80 y.o. : 1929 MR#: [id number] SSN: [id number] Date/Time: 2020 Subjective: Afebrile x24 hours. Out of restraints for 24 hours. Taking medications without difficulty, and with some improvement in p.o. intake. Hospice and palliative medicine have continued to speak with wife. Assessment/Plan:  
Patient with fall and L fracture 1. Acute on chronic L1 fracture from fall 2. 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 4. Hypothyroid on synthroid. 5. Severe osteoporosis 6. Dyslipidemia on statin. 7. Urinary retention - S/p Nuñez cath with 500 ml urine out put - on flomax. 8. C diff - po vanco x 10 days. 9. Underweight Body mass index is 17.43 kg/m². calorie count initiated. Nutritionist following. On supplements, multivit. 10. Hypophosphatemia replete as needed. 11. Metabolic encephalopathy superimposed on dementia with behavioral disturbance - multifactorial related to acute infection, reversed sleep wake cycle -  ms 2020 - seroquel daily, remeron qHS. Stopped zyprexa. Treat underlying causes. Maximize nutrition. 12. Avoid chemical dvt prophylaxis 13. DNR / DNI - POST form completed - palliative care input appreciated. Wife is considering home with hospice. Palliative medicine and hospice input appreciated. Case discussed with:  [x]Patient  [] Family/ wife   [x]Nursing  [x]Case Management DVT Prophylaxis:  []Lovenox  []Hep SQ  [x]SCDs  []Coumadin   []On Heparin gtt Objective:  
VS:  
Visit Vitals /65 (BP 1 Location: Right arm, BP Patient Position: At rest) Pulse 98 Temp 98.9 °F (37.2 °C) Resp 20 Ht 5' 9\" (1.753 m) Wt 53.5 kg (118 lb) SpO2 90% BMI 17.43 kg/m² Tmax/24hrs: Temp (24hrs), Av.8 °F (37.1 °C), Min:97.6 °F (36.4 °C), Max:99.9 °F (37.7 °C) IOBRIEF Intake/Output Summary (Last 24 hours) at 6/18/2020 1517 Last data filed at 6/18/2020 1322 Gross per 24 hour Intake 225 ml Output 340 ml Net -115 ml General: found propped up in bed. Sounds awful looks at speaker, states his first name Cardiovascular: S1S2 - regular , no murmur Pulmonary: Equal expansion , No Use of accessory muscles , No Rales No Rhonchi GI:  soft, non-tender, nd nabs. Extremities:  No edema; 2+ dorsalis pedis pulses bilaterally Neuro: asleep, not answering questions, not following commands Medications:  
Current Facility-Administered Medications Medication Dose Route Frequency  arformoteroL (BROVANA) neb solution 15 mcg  15 mcg Nebulization BID RT  
 budesonide (PULMICORT) 500 mcg/2 ml nebulizer suspension  500 mcg Nebulization BID RT  
 phosphorus (K PHOS NEUTRAL) 250 mg tablet 1 Tab  1 Tab Oral BID  QUEtiapine (SEROquel) tablet 12.5 mg  12.5 mg Oral DAILY  mirtazapine (REMERON SOL-TAB) disintegrating tablet 15 mg  15 mg Oral QHS  vancomycin (FIRVANQ) 50 mg/mL oral solution 125 mg  125 mg Oral Q6H  
 tamsulosin (FLOMAX) capsule 0.4 mg  0.4 mg Oral QHS  loperamide (IMODIUM) capsule 2 mg  2 mg Oral Q4H PRN  
 acetaminophen (TYLENOL) tablet 650 mg  650 mg Oral Q4H PRN  
 albuterol-ipratropium (DUO-NEB) 2.5 MG-0.5 MG/3 ML  3 mL Nebulization Q4H PRN  
 alendronate (FOSAMAX) tablet 70 mg  70 mg Oral every Thursday  levothyroxine (SYNTHROID) tablet 25 mcg  25 mcg Oral 6am  
 therapeutic multivitamin (THERAGRAN) tablet 1 Tab  1 Tab Oral DAILY  atorvastatin (LIPITOR) tablet 10 mg  10 mg Oral QHS  
 HYDROcodone-acetaminophen (NORCO) 5-325 mg per tablet 1 Tab  1 Tab Oral Q4H PRN  
 ipratropium (ATROVENT) 0.02 % nebulizer solution 0.5 mg  0.5 mg Nebulization Q6H RT  
 LORazepam (ATIVAN) tablet 1 mg  1 mg Oral Q8H PRN Labs:   
Recent Labs  
  06/16/20 
0327 WBC 10.8 HGB 14.1 HCT 41.4  Recent Labs  
  06/16/20 
0327   
K 3.5  CO2 33* * BUN 38* CREA 1.03  
CA 8.5 MG 2.3 PHOS 1.6* Disclaimer: Sections of this note are dictated utilizing voice recognition software, which may have resulted in some phonetic based errors in grammar and contents. Even though attempts were made to correct all the mistakes, some may have been missed, and remained in the body of the document. If questions arise, please contact our department. Signed By: Nellie Lamb MD   
 June 18, 2020

## 2020-06-18 NOTE — PROGRESS NOTES
Received call from Sherlyn Bowers at the [place]; had a conference call with her and Ms Ty Chu at the [place]. Both are saying pt does not have a \"Medicare approved hospice diagnosis\" therefore they will NOT be providing any in home support assistance for patient. Spoke with Alva Lam at [facility] to relay this information. She states the Medical Director will be issuing a report that will list an approved hospice diagnosis later today. Will fax that to Ms To Arnold for reconsideration. Ifeoma Schulte RN - Outcomes Manager  [phone]

## 2020-06-18 NOTE — PROGRESS NOTES
Problem: Dysphagia (Adult) Goal: *Acute Goals and Plan of Care (Insert Text) Description: Dysphagia Present: mild oropharyngeal 
Aspiration: delayed throat clear Recommendations: 
Diet: Puree diet/ thin, feeder/ assist with feeding as needed Meds: whole in puree Aspiration Precautions Oral Care TID Other:  
 
Goals:  Patient will: 1. Tolerate PO trials with no overt s/s aspiration or distress in 4/5 trials 2. Complete an objective swallow study (i.e., MBSS) to assess swallow integrity, r/o aspiration, and determine of safest LRD, min A Outcome: Not Progressing Towards Goal 
  
SPEECH LANGUAGE PATHOLOGY DYSPHAGIA TREATMENT Patient: Arlene Dee (5 y.o. male) Date: 6/18/2020 Diagnosis: Closed compression fracture of L3 vertebra ([address] [S32.030A] Spinal stenosis, lumbar [M48.061] <principal problem not specified> Precautions: aspiration Fall, Back, Contact(soft wrist restraints; enteric) PLOF: As per H&P   
 
ASSESSMENT: 
Pt was seen at bedside in unison with palliative care. His wife was at bedside. Pt tolerated puree and thin liquid via straw without any overt s/sx of aspiration. Delayed throat clear/altered vocal quality observed with weak laryngeal elevation to palpation. He refused any further solid PO. Pt's wife reported that she has been chopping food into tiny pieces. Reviewed puree consistency and she reported that she feels he will tolerate puree foods better. Further reviewed pt's lack of intake may wax/wane in the future. Recommend diet change to puree with thin liquids, aspiration precautions, oral care TID, and meds crushed in puree. ST will continue to follow. Progression toward goals: 
[]         Improving appropriately and progressing toward goals 
[]         Improving slowly and progressing toward goals [x]         Not making progress toward goals and plan of care will be adjusted PLAN: 
Recommendations and Planned Interventions: See above Patient continues to benefit from skilled intervention to address the above impairments. Continue treatment per established plan of care. Discharge Recommendations:  Home Health, [facility] [place] [facility], and To Be Determined SUBJECTIVE:  
Patient stated \"Where are we going? . OBJECTIVE:  
Cognitive and Communication Status: 
Neurologic State: Alert Orientation Level: Oriented to person Cognition: Decreased command following Perception: Appears intact Perseveration: No perseveration noted Safety/Judgement: Decreased awareness of environment, Decreased awareness of need for assistance, Decreased insight into deficits, Fall prevention Dysphagia Treatment: 
Oral Assessment: 
Oral Assessment Labial: Decreased rate Dentition: Natural, Full Oral Hygiene: good Lingual: Decreased rate, Decreased strength Velum: No impairment Mandible: No impairment P.O. Trials: 
 Patient Position: HOB 50* Vocal quality prior to P.O.: (suspect baseline) Consistency Presented: Thin liquid, puree How Presented: SLP-fed/presented, Straw, Successive swallows Bolus Acceptance: No impairment Bolus Formation/Control: Impaired Type of Impairment: Delayed, Mastication Propulsion: Delayed (# of seconds) Oral Residue: Less than 10% of bolus, Lingual 
 Initiation of Swallow: No impairment Laryngeal Elevation: Functional 
 Aspiration Signs/Symptoms: Clear throat Pharyngeal Phase Characteristics: Suspected pharyngeal residue Effective Modifications: Alternate liquids/solids, Small sips and bites Cues for Modifications: Moderate-maximal 
   
 Oral Phase Severity: Mild Pharyngeal Phase Severity : Mild PAIN: 
Start of Tx: 0 End of Tx: 0 After treatment:  
[]              Patient left in no apparent distress sitting up in chair 
[x]              Patient left in no apparent distress in bed 
[x]              Call bell left within reach [x]              Nursing notified 
[]              Family present []              Caregiver present 
[]              Bed alarm activated COMMUNICATION/EDUCATION:  
[x] Aspiration precautions; swallow safety; compensatory techniques [x]        Patient/family able to participate in training and education Thank you for this referral. 
 
Daria Camp M.S. CCC-SLP/L Speech-Language Pathologist

## 2020-06-18 NOTE — CONSULTS
Palliative Medicine Consult DR. TORRES'S [facility]: [id number] ([id number]) [facility]: [phone] [facility]: [phone] Patient Name: Osvaldo Alonso YOB: 1929 Date of Initial Consult: 6/15/2020, follow up 6/16/2020, 6/17/2020, 6/18/2020 Reason for Consult: care decisions Requesting Provider: Wilfrido Hidalgo and [facility] Primary Care Physician: Marce Estrella MD 
  
 SUMMARY:  
Osvaldo Alonso is a 80y.o. year old with a past history of atrial fib,hypertrophy of prostate with urinary obstruction due to hypertrophy of prostate, mild cognitive dysfunction , osteoporosis who was admitted on 6/12/2020 from home  with a diagnosis of fall about 2 weeks ago now with increased pain. Seen by otho, who suggested PT, non surgical approach. Palliative Medicine is consulted for goals of care. 6/18/2020  Patient a bit more talkative today, however still confused at times a bit agitated. Wife at bedside. Current plan is home with hospice tomorrow. Goals of care DNR/DNI  
 
6/17/2020 met with wife, hospice at bedside. Shared with wife at this point believe Mr Imsael Peñaloza will be best serviced with hospice support at home. Highly encouraged her to explore hiring extra care givers for safety. DNR/DNI. 6/16/2020 seen x 2 today. This am responded to [facility], still confused. Agitated at times. Ate a bit of breakfast. Per RN not drinking. Met with wife this afternoon, He is C diff + wife aware. Not sure he is going to eat and drink enough to sustain himself for a long period of time. Wife does not want feeding tubes, I agree. Will place Hospice consult for information. Wife aware and agreeable PALLIATIVE DIAGNOSES:  
1. Goals of care 2. Closed compression fracture of lumber vertebra 3. Diarrhea 4. Delirium PLAN:  
1. 6/18/2020 Seen at bedside, taking a bit more orals but still doubt he will take enough over the long run to sustain himself.  Appreciate speech and hospice assistance. Long conversations again with wife at bedside. Support offered. I have shared with wife, again today best care for Mr Alberto Rust at this point is home with hospice support and hired care givers. Allow him to eat and drink what is pleasurable and there maybe times his appetite is more robust then others, but overall doubt he will eat enough to sustain for a long period of time. Hospice will offer a few sessions of PT at home, but again have shared I do not know if he will participate. She is aware his functional level has declined and not sure ( doubt ) it will return to his pre hospital state, which means he will be in a more bed bound state. Have recommended to keep the murcia cath, however if his cognition improves it can be removed by hospice at home. Current plan is home with hospice tomorrow once equipment is delivered. Have encouraged her to hire extra care givers for safety. Goals of care DNR/DNI limited while in hospital no feeding tubes. ( see below for previous notes) 2. 6/17/2020 Mr Juana Faith seen at bedside along with Ms Kourtney Green present. Wife also present. He remains confused, answers a few simple questions, but does not really follow any commands for me. Still tries to get of bed, but will re direct at times. He is not eating. Per RN no stools today. Long conversation with wife, hospice. Appreciate hospice assistance, liaison explained their service/ benefits. Shared with Ms Alberto Rust, really feel at this juncture he will be best served with home hospice, and hired care givers. That would give him a chance to get home with her. Hospice will provide a few PT sessions, but I do not believe he will participate and shared this with wife. Stress and encourage the need to have extra care givers in the home for help. She wishes to consider the information. Difficult decision for this elderly wife. We offered support. Re address tomorrow with wife, hospice will also follow up. Goals of care DNR/DNI limited interventions for now, no feeding tubes. 3. 6/16/2020 seen x 2 today. He seems a bit more verbal to me then yesterday, however still confused now requiring soft restraints for safety. Per RN will take his meds, ate a bit of breakfast, doesn't drink fluids well. C diff + wife is aware. Per BSRN less episodes of loss stools. Spoke with wife again today. She is very worried about her , offered support. Honest but compassionate discussion, just not sure how he is going to do in the long term. I expressed my worry, concerned he will not eat and drink enough to sustain himself in the long term. Despite his functional level at home, worry he will no longer be able to obtain this level of functioning. Discussed placing a hospice consult for information of which she is agreeable with. PT/ ST on going. Will discuss choices with her tomorrow. Discussed with attending. Goals of care DNR/DNI, limited interventions, no feeding tubes 4. Goals of care Seen at bedside today along with Ms Teofilo Agosto. Patient was not very responsive when seen this am, Seen again with wife present, remains confused wanting to get out of bed to use the restroom. He can name his wife, no conversation following with her or our team. Patient is not able to participate in his medical decisions. Spoke with his wife via phone and in person. Lengthy conversation on overall condition. She shares with us he has fallen in the past and required antibiotics for various infections but none sine December. Wife notes some cognitive dysfunction when at home, but able to attend to his own ADL's. She was able to bring us his AMD which was previously completed. Goals of care discussed at length including the benefits and burdens of resuscitation with advanced age.  After thought and consideration wife changed goals of care to DNR/DNI. POST form introduced and discussed, signed by wife for DNR/DNI limited interventions, no feeding tubes. Patient and wife live together. They have 2 living children but both out of town and per wife have health issues of their own. If patient is not able to become more functional and ambulatory,wife will need assistance in caring for him. Discussed with CM. We did broach changing to a more comfort directed care plan if patient does not improve. We will continue to follow with you. 5. Acute on chronic fracture L1 ortho on board / non surgical approach. PT on board. Pain noted with rolling. Concerned about his functional level moving forward, did share this with his wife. 6. Diarrhea continue to have loose stools, wants to get up to use restroom. C diff pending 7. Delirium on Zyprexa 2.5 mg ? Contributing to his sleepiness. Was restless and trying to get of bed. Per wife mild cognitive dysfunction at baseline. 8. Initial consult note routed to primary continuity provider 9. Communicated plan of care with: Palliative IDT, wife, RN, CM Home with hospice tomorrow TREATMENT PREFERENCES:  
Code Status: DNR/DNI Advance Care Planning: 
[] [facility] Interdisciplinary Team has updated the ACP Navigator with [address] and Patient Capacity Primary Decision [facility] ([address]):   Primary Decision Maker: Brian Ramirez - [phone] Primary Decision Maker: Leandro Rob - Other Relative - [phone] Secondary Decision Maker: Fidencio Robert - Other Relative - [phone] Medical Interventions: Limited additional interventions Other Instructions: no feeding tubes Artificially Administered Nutrition: No feeding tube Other: As far as possible, the palliative care team has discussed with patient / health care proxy about goals of care / treatment preferences for patient.  
 
 HISTORY:  
 
 History obtained from: chart and wife CHIEF COMPLAINT: fall HPI/SUBJECTIVE: The patient is:  
[] Verbal and participatory [x] Non-participatory due to: confusion Please see summary Clinical Pain Assessment (nonverbal scale for nonverbal patients): Clinical Pain Assessment Severity: 0 Activity (Movement): Seeking attention through movement or slow, cautious movement Duration: for how long has pt been experiencing pain (e.g., 2 days, 1 month, years) Frequency: how often pain is an issue (e.g., several times per day, once every few days, constant) FUNCTIONAL ASSESSMENT:  
 
Palliative Performance Scale (PPS): PPS: 30 
 
ECOG 
ECOG Status : Completely disabled PSYCHOSOCIAL/SPIRITUAL SCREENING:  
  
Any spiritual / Mandaen concerns: unable to assess for patient  
[] Yes /  [] No 
 
Caregiver Burnout: 
[] Yes /  [x] No /  [] No Caregiver Present Anticipatory grief assessment:  Unable to assess for patient  
[] Normal  / [] Maladaptive REVIEW OF SYSTEMS:  
 
Positive and pertinent negative findings in ROS are noted above in HPI. The following systems were [] reviewed / [x] unable to be reviewed as noted in HPI Other findings are noted below. Systems: constitutional, ears/nose/mouth/throat, respiratory, gastrointestinal, genitourinary, musculoskeletal, integumentary, neurologic, psychiatric, endocrine. Positive findings noted below. Modified ESAS Completed by: provider Fatigue: 8 Pain: 0 Anorexia: 6 Dyspnea: 0 Stool Occurrence(s): 1 PHYSICAL EXAM:  
 
Wt Readings from Last 3 Encounters:  
06/12/20 53.5 kg (118 lb) 03/06/20 54.9 kg (121 lb) 08/16/19 56.7 kg (125 lb) Blood pressure 129/65, pulse 98, temperature 98.9 °F (37.2 °C), resp. rate 20, height 5' 9\" (1.753 m), weight 53.5 kg (118 lb), SpO2 90 %. Pain: 
Pain Scale 1: Adult Nonverbal Pain Scale Pain Intensity 1: 0 Pain Location 1: Generalized Pain Description 1: Other (comment)(\"it hurts all over\") Pain Intervention(s) 1: Medication (see MAR) Last bowel movement: x 1 today Constitutional: in bed, a bit agitated at times, answers some questions, confused Respiratory: breathing not labored Musculoskeletal:  on right side Skin: warm, dry Neurologic: oriented to himself, knew [facility] was in [place] otherwise not oriented HISTORY:  
 
Active Problems: 
  Spinal stenosis, lumbar (2020) Closed compression fracture of L3 vertebra ([facility] [address] (2020) Past Medical History:  
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  
Procedure Laterality Date  HX CORONARY ARTERY BYPASS GRAFT  3/12  HX HERNIA REPAIR    
 HX HIP REPLACEMENT  '60 & '06  
 x 2  
 HX OTHER SURGICAL  10/9/12 [facility] Family History Problem Relation Age of Onset  Cancer Mother  Emphysema Father History reviewed, no pertinent family history. Social History Tobacco Use  Smoking status: Former Smoker Last attempt to quit: 1990 Years since quittin.4  Smokeless tobacco: Never Used Substance Use Topics  Alcohol use: Yes   Comment: social  
 
 No Known Allergies Current Facility-Administered Medications Medication Dose Route Frequency  arformoteroL (BROVANA) neb solution 15 mcg  15 mcg Nebulization BID RT  
 budesonide (PULMICORT) 500 mcg/2 ml nebulizer suspension  500 mcg Nebulization BID RT  
 phosphorus (K PHOS NEUTRAL) 250 mg tablet 1 Tab  1 Tab Oral BID  QUEtiapine (SEROquel) tablet 12.5 mg  12.5 mg Oral DAILY  mirtazapine (REMERON SOL-TAB) disintegrating tablet 15 mg  15 mg Oral QHS  vancomycin (FIRVANQ) 50 mg/mL oral solution 125 mg  125 mg Oral Q6H  
 tamsulosin (FLOMAX) capsule 0.4 mg  0.4 mg Oral QHS  loperamide (IMODIUM) capsule 2 mg  2 mg Oral Q4H PRN  
 acetaminophen (TYLENOL) tablet 650 mg  650 mg Oral Q4H PRN  
 albuterol-ipratropium (DUO-NEB) 2.5 MG-0.5 MG/3 ML  3 mL Nebulization Q4H PRN  
 alendronate (FOSAMAX) tablet 70 mg  70 mg Oral every Thursday  levothyroxine (SYNTHROID) tablet 25 mcg  25 mcg Oral 6am  
 therapeutic multivitamin (THERAGRAN) tablet 1 Tab  1 Tab Oral DAILY  atorvastatin (LIPITOR) tablet 10 mg  10 mg Oral QHS  
 HYDROcodone-acetaminophen (NORCO) 5-325 mg per tablet 1 Tab  1 Tab Oral Q4H PRN  
 ipratropium (ATROVENT) 0.02 % nebulizer solution 0.5 mg  0.5 mg Nebulization Q6H RT  
 LORazepam (ATIVAN) tablet 1 mg  1 mg Oral Q8H PRN  
 
 
 LAB AND IMAGING FINDINGS:  
 
Lab Results Component Value Date/Time WBC 10.8 06/16/2020 03:27 AM  
 HGB 14.1 06/16/2020 03:27 AM  
 PLATELET 192 49/04/1125 03:27 AM  
 
Lab Results Component Value Date/Time Sodium 140 06/16/2020 03:27 AM  
 Potassium 3.5 06/16/2020 03:27 AM  
 Chloride 103 06/16/2020 03:27 AM  
 CO2 33 (H) 06/16/2020 03:27 AM  
 BUN 38 (H) 06/16/2020 03:27 AM  
 Creatinine 1.03 06/16/2020 03:27 AM  
 Calcium 8.5 06/16/2020 03:27 AM  
 Magnesium 2.3 06/16/2020 03:27 AM  
 Phosphorus 1.6 (L) 06/16/2020 03:27 AM  
  
Lab Results Component Value Date/Time Alk.  phosphatase 193 (H) 06/12/2020 01:38 AM  
 Protein, total 7.2 06/12/2020 01:38 AM  
 Albumin 3.7 06/12/2020 01:38 AM  
 Globulin 3.5 06/12/2020 01:38 AM  
 
Lab Results Component Value Date/Time INR 1.4 (H) 06/15/2020 04:16 AM  
 Prothrombin time 16.5 (H) 06/15/2020 04:16 AM  
  
No results found for: IRON, FE, TIBC, IBCT, PSAT, FERR No results found for: PH, PCO2, PO2 No components found for: [place] Lab Results Component Value Date/Time  06/12/2020 01:38 AM  
 CK - MB 4.2 (H) 06/12/2020 01:38 AM  
  
 
   
 
Total time: 35 minutes Counseling / coordination time, spent as noted above: 25 minutes  
> 50% counseling / coordination: yes with wife, CM, hospice Prolonged service was provided for  []30 min   []75 min in face to face time in the presence of the patient, spent as noted above. Time Start:   
Note: this can only be billed with 79536 (initial) or 20959 (follow up). If multiple start / stop times, list each separately.

## 2020-06-18 NOTE — PROGRESS NOTES
Problem: Risk for Spread of Infection Goal: Prevent transmission of infectious organism to others Description: Prevent the transmission of infectious organisms to other patients, staff members, and visitors. Outcome: Progressing Towards Goal 
  
Problem: Patient Education:  Go to Education Activity Goal: Patient/Family Education Outcome: Progressing Towards Goal 
  
Problem: Pressure Injury - Risk of 
Goal: *Prevention of pressure injury Description: Document Anthony Scale and appropriate interventions in the flowsheet. Outcome: Progressing Towards Goal 
Note: Pressure Injury Interventions: 
Sensory Interventions: Assess changes in LOC, Assess need for specialty bed, Avoid rigorous massage over bony prominences, Check visual cues for pain, Discuss PT/OT consult with provider, Float heels, Keep linens dry and wrinkle-free, Maintain/enhance activity level, Minimize linen layers, Monitor skin under medical devices, Pad between skin to skin, Pressure redistribution bed/mattress (bed type), Turn and reposition approx. every two hours (pillows and wedges if needed), Use 30-degree side-lying position Moisture Interventions: Absorbent underpads, Apply protective barrier, creams and emollients, Assess need for specialty bed, Check for incontinence Q2 hours and as needed, Internal/External urinary devices, Limit adult briefs, Maintain skin hydration (lotion/cream), Minimize layers, Moisture barrier, Offer toileting Q_hr Activity Interventions: Assess need for specialty bed, Pressure redistribution bed/mattress(bed type) Mobility Interventions: Assess need for specialty bed, Float heels, HOB 30 degrees or less, Pressure redistribution bed/mattress (bed type), PT/OT evaluation, Turn and reposition approx. every two hours(pillow and wedges) Nutrition Interventions: Document food/fluid/supplement intake, Discuss nutritional consult with provider, Offer support with meals,snacks and hydration Friction and Shear Interventions: Apply protective barrier, creams and emollients, Feet elevated on foot rest, Foam dressings/transparent film/skin sealants, HOB 30 degrees or less, Lift sheet, Lift team/patient mobility team, Minimize layers, Transferring/repositioning devices Problem: Patient Education: Go to Patient Education Activity Goal: Patient/Family Education Outcome: Progressing Towards Goal 
  
Problem: Falls - Risk of 
Goal: *Absence of Falls Description: Document Laretta Esha Fall Risk and appropriate interventions in the flowsheet. Outcome: Progressing Towards Goal 
Note: Fall Risk Interventions: 
Mobility Interventions: Assess mobility with egress test, Bed/chair exit alarm, Communicate number of staff needed for ambulation/transfer, OT consult for ADLs, Patient to call before getting OOB, PT Consult for mobility concerns, PT Consult for assist device competence Mentation Interventions: Adequate sleep, hydration, pain control, Bed/chair exit alarm, Door open when patient unattended, Evaluate medications/consider consulting pharmacy, Eyeglasses and hearing aids, Familiar objects from home, More frequent rounding, Reorient patient, Room close to nurse's station, Toileting rounds, Update white board Medication Interventions: Assess postural VS orthostatic hypotension, Bed/chair exit alarm, Evaluate medications/consider consulting pharmacy Elimination Interventions: Bed/chair exit alarm, Call light in reach, Toileting schedule/hourly rounds History of Falls Interventions: Bed/chair exit alarm, Consult care management for discharge planning, Door open when patient unattended, Evaluate medications/consider consulting pharmacy, Investigate reason for fall, Room close to nurse's station, Assess for delayed presentation/identification of injury for 48 hrs (comment for end date), Vital signs minimum Q4HRs X 24 hrs (comment for end date) Problem: Patient Education: Go to Patient Education Activity Goal: Patient/Family Education Outcome: Progressing Towards Goal 
  
Problem: Nutrition Deficit Goal: *Optimize nutritional status Outcome: Progressing Towards Goal 
  
Problem: Pain Goal: *Control of Pain Outcome: Progressing Towards Goal 
Goal: *PALLIATIVE CARE:  Alleviation of Pain Outcome: Progressing Towards Goal 
  
Problem: Patient Education: Go to Patient Education Activity Goal: Patient/Family Education Outcome: Progressing Towards Goal 
  
Problem: Patient Education: Go to Patient Education Activity Goal: Patient/Family Education Outcome: Progressing Towards Goal 
  
Problem: Patient Education: Go to Patient Education Activity Goal: Patient/Family Education Outcome: Progressing Towards Goal 
  
Problem: Patient Education: Go to Patient Education Activity Goal: Patient/Family Education Outcome: Progressing Towards Goal

## 2020-06-18 NOTE — PROGRESS NOTES
Problem: Risk for Spread of Infection Goal: Prevent transmission of infectious organism to others Description: Prevent the transmission of infectious organisms to other patients, staff members, and visitors. Outcome: Progressing Towards Goal 
  
Problem: Patient Education:  Go to Education Activity Goal: Patient/Family Education Outcome: Progressing Towards Goal 
  
Problem: Pressure Injury - Risk of 
Goal: *Prevention of pressure injury Description: Document Anthony Scale and appropriate interventions in the flowsheet. Outcome: Progressing Towards Goal 
Note: Pressure Injury Interventions: 
Sensory Interventions: Assess changes in LOC Moisture Interventions: Absorbent underpads Activity Interventions: Assess need for specialty bed Mobility Interventions: Assess need for specialty bed Nutrition Interventions: Document food/fluid/supplement intake Friction and Shear Interventions: Apply protective barrier, creams and emollients Problem: Patient Education: Go to Patient Education Activity Goal: Patient/Family Education Outcome: Progressing Towards Goal 
  
Problem: Falls - Risk of 
Goal: *Absence of Falls Description: Document [place] Cease Fall Risk and appropriate interventions in the flowsheet. Outcome: Progressing Towards Goal 
Note: Fall Risk Interventions: 
Mobility Interventions: Assess mobility with egress test 
 
Mentation Interventions: Adequate sleep, hydration, pain control Medication Interventions: Assess postural VS orthostatic hypotension Elimination Interventions: Bed/chair exit alarm History of Falls Interventions: Bed/chair exit alarm Problem: Patient Education: Go to Patient Education Activity Goal: Patient/Family Education Outcome: Progressing Towards Goal 
  
Problem: Nutrition Deficit Goal: *Optimize nutritional status Outcome: Progressing Towards Goal 
  
Problem: Pain Goal: *Control of Pain Outcome: Progressing Towards Goal 
 Goal: *PALLIATIVE CARE:  Alleviation of Pain Outcome: Progressing Towards Goal 
  
Problem: Patient Education: Go to Patient Education Activity Goal: Patient/Family Education Outcome: Progressing Towards Goal 
  
Problem: Patient Education: Go to Patient Education Activity Goal: Patient/Family Education Outcome: Progressing Towards Goal 
  
Problem: Patient Education: Go to Patient Education Activity Goal: Patient/Family Education Outcome: Progressing Towards Goal 
  
Problem: Patient Education: Go to Patient Education Activity Goal: Patient/Family Education Outcome: Progressing Towards Goal

## 2020-06-18 NOTE — PROGRESS NOTES
Problem: Self Care Deficits Care Plan (Adult) Goal: *Acute Goals and Plan of Care (Insert Text) Description: Occupational Therapy Goals Initiated 6/17/2020 within 7 day(s). 1.  Patient will follow 2/3 commands during functional activities with VCs.  
2.  Patient will maneuver to the EOB with max assist in prep for self care. 3.  Patient will participate in upper extremity therapeutic exercise/activities with moderate assistance  for 8 minutes to increase strength/endurance for ADLs. Prior Level of Function: Patient unable to provide PLOF. He inconsistently answers questions and follows commands 20% of the time. Outcome: Progressing Towards Goal 
 OCCUPATIONAL THERAPY TREATMENT Patient: Nellie Gallagher (86 y.o. male) Date: 6/18/2020 Diagnosis: Closed compression fracture of L3 vertebra ([facility] [address] [S32.030A] Spinal stenosis, lumbar [M48.061] <principal problem not specified> Precautions: Fall, Back, Contact(soft wrist restraints; enteric) Chart, occupational therapy assessment, plan of care, and goals were reviewed. ASSESSMENT: 
Pt moaning upon entry. Denies pain, requesting water. Pt profoundly weak. Unable to perform container mgt task, opening Ensure drink. Pt requires hand over hand and Max Assist performing self-feeding task, bring drink to mouth for 3 trials. Pt w/little intake, alerting Melva NAGY, ~ one third of Ensure consumed. Pt performs simple ADL grooming task, facial hygiene, w/hand over hand and Max Assist.  
Progression toward goals: 
[]          Improving appropriately and progressing toward goals [x]          Improving slowly and progressing toward goals 
[]          Not making progress toward goals and plan of care will be adjusted PLAN: 
Patient continues to benefit from skilled intervention to address the above impairments. Continue treatment per established plan of care. Discharge Recommendations:  Return home w/24 hr care Further Equipment Recommendations for Discharge:  hospital bed SUBJECTIVE:  
Patient stated I'm thirsty. Can you get me some water?  OBJECTIVE DATA SUMMARY:  
Cognitive/Behavioral Status: 
Neurologic State: Alert Orientation Level: Oriented to person Cognition: Decreased command following Safety/Judgement: Decreased awareness of environment, Decreased awareness of need for assistance, Decreased insight into deficits, Fall prevention Functional Mobility and Transfers for ADLs: 
ADL Intervention: 
Feeding Feeding Assistance: Maximum assistance Container Management: Total assistance (dependent) Drink to Mouth: Maximum assistance(w/hand over hand assist) Grooming Position Performed: Other (comment)(supine w/HOB raised) Washing Face: Maximum assistance(w/hand over hand assist) UE Therapeutic Exercises: PROM > AAROM BUE shoulder flexion ~ 90 degrees at bed level Pain: 
Pain level pre-treatment: 0/10 Pain level post-treatment: 0/10 Pt moaning throughout session, however, does not report pain Activity Tolerance:   
Poor Please refer to the flowsheet for vital signs taken during this treatment. After treatment:  
[]  Patient left in no apparent distress sitting up in chair 
[x]  Patient left in no apparent distress in bed 
[x]  Call bell left within reach [x]  Nursing notified 
[]  Caregiver present 
[]  Bed alarm activated COMMUNICATION/EDUCATION:  
[] Role of Occupational Therapy in the acute care setting 
[] Home safety education was provided and the patient/caregiver indicated understanding. [] Patient/family have participated as able in working towards goals and plan of care. [] Patient/family agree to work toward stated goals and plan of care. [] Patient understands intent and goals of therapy, but is neutral about his/her participation. [x] Patient is unable to participate in goal setting and plan of care 2/2 increase confusion.  
 
 
Thank you for this referral. 
 Vj Rosales Time Calculation: 11 mins

## 2020-06-18 NOTE — HOSPICE
Called and spoke with Renee Castro, pt's wife advised she has contacted 2 agencies that she was referred to by family members. She stated they told her they could only provide home care if they provided hospice. I advised that may be their requirements but that is not a medicare requirement and was not the case with the list of the agencies that we gave her. She stated she would like all services to be provided by one agency for communication purposes. I explained that [employer] has worked with several agencies over the years and communicate well with them all. She agreed to contact some of the agencies on the list provided and advised she would give me her decision today when she gets to the hospital. Will update this afternoon. Hood Cummings RN [place] [address] [place], [facility] [address] 
[phone] Email: [email or web address]

## 2020-06-18 NOTE — CDMP QUERY
Pt noted to have behavioral disturbance in the setting of metabolic disturbance, acute infection, reversed sleep wake cycle If possible, please document in the progress notes and d/c summary if you are evaluating and / or treating any of the following: ? Metabolic Encephalopathy with Dementia ? Encephalopathy due to medications or drugs (please specify) with Dementia ? No Encephalopathy , only Dementia with Behavioral Disturbance ? Other Encephalopathy 
? Other, please specify ? Clinically unable to determine The medical record reflects the following: 
 
   Risk Factors: 81 yo with chronic medical conditions, including   dementia with behavioral disturbances. Clinical Indicators: PN 6/18- dementia with behavioral disturbance in the setting of metabolic disturbance, acute infection, reversed sleep wake cycle -  ms 6/12/2020 - seroquel daily, remeron qHS. Stopped zyprexa. Treat underlying causes. Maximize nutrition. Steroid induced psychosis - off steroids. Now improving with treatment- Out of restraints for 24 hours. Taking medications without difficulty, and with some improvement in p.o. intake. C-Diff DNA + Treatment: Treatment of underlying medical conditions, as noted above metabolic conditions, medications, nutrition, sitter. Thank you Dr. Sae Fallon, Chris Gary RN, [email or web address]

## 2020-06-19 PROBLEM — Z51.5 HOSPICE CARE PATIENT: Status: ACTIVE | Noted: 2020-01-01

## 2020-06-19 NOTE — PROGRESS NOTES
Problem: Dysphagia (Adult) Goal: *Acute Goals and Plan of Care (Insert Text) Description:   
Recommendations: 
Diet: Puree with thin Meds: whole in puree Aspiration Precautions Oral Care TID Other: assistance with PO 
 
Goals:  Patient will: 1. Tolerate PO trials with no overt s/s aspiration or distress in 4/5 trials  - met 2. Complete an objective swallow study (i.e., MBSS) to assess swallow integrity, r/o aspiration, and determine of safest LRD, min A - n/a Outcome: Resolved/Met SPEECH LANGUAGE PATHOLOGY DYSPHAGIA TREATMENT & DISCHARGE Patient: Ishan Malone (65 y.o. male) Date: 6/19/2020 Diagnosis: Closed compression fracture of L3 vertebra ([facility] [address] [S32.030A] Spinal stenosis, lumbar [M48.061] <principal problem not specified> Precautions: aspiration Fall, Back, Contact(soft wrist restraints; enteric) PLOF: As per H&P  
 
ASSESSMENT: 
Pt was seen at bedside for follow up dysphagia management. Pt fed ~75% of breakfast tray of puree with thin liquids with strong cough x 1 during entire therapy session. Laryngeal elevation was weak to palpation. Per chart review: plan is for pt to go home with hospice today. Would continue to rec puree diet with thin liquids, aspiration precautions, oral care TID, and meds crushed in puree. He will require assistance with all PO. No further skilled SLP services are indicated at this time. Please re-consult if needed. Progression toward goals: 
[x]         Improving appropriately - goals met/approximated 
[]         Not making progress/Not appropriate - will d/c POC PLAN: 
Recommendations and Planned Interventions: 
Maximum therapeutic gains met; safest, least restrictive diet achieved. D/C ST intervention at this time. Discharge Recommendations:  hospice SUBJECTIVE:  
Patient stated That yogurt is really good. Where did you get that? . OBJECTIVE:  
Cognitive and Communication Status: Neurologic State: Alert, Eyes open spontaneously Orientation Level: Oriented to person Cognition: Decreased attention/concentration, Impaired decision making, Poor safety awareness Perception: Appears intact Perseveration: No perseveration noted Safety/Judgement: Decreased awareness of environment, Decreased awareness of need for assistance, Decreased insight into deficits, Fall prevention Dysphagia Treatment: 
Oral Assessment: 
Oral Assessment Labial: Decreased rate Dentition: Natural, Full Oral Hygiene: good Lingual: Decreased rate, Decreased strength Velum: No impairment Mandible: No impairment P.O. Trials: 
 Patient Position: HOB 50* Vocal quality prior to P.O.: (suspect baseline) Consistency Presented: Thin liquid, puree How Presented: SLP-fed/presented, Straw, Successive swallows Bolus Acceptance: No impairment Bolus Formation/Control: Impaired Type of Impairment: Delayed, Mastication Propulsion: Delayed (# of seconds) Oral Residue: Less than 10% of bolus, Lingual 
 Initiation of Swallow: No impairment Laryngeal Elevation: Functional 
 Aspiration Signs/Symptoms: Clear throat Pharyngeal Phase Characteristics: Suspected pharyngeal residue Effective Modifications: Alternate liquids/solids, Small sips and bites Cues for Modifications: Moderate-maximal 
   
 Oral Phase Severity: Mild Pharyngeal Phase Severity : Mild PAIN: 
Start of Tx: 0 End of Tx: 0 After treatment:  
[]              Patient left in no apparent distress sitting up in chair 
[x]              Patient left in no apparent distress in bed 
[x]              Call bell left within reach [x]              Nursing notified 
[]              Caregiver present 
[]              Bed alarm activated COMMUNICATION/EDUCATION:  
[x] Aspiration precautions; swallow safety; compensatory techniques [x]  Patient unable to participate in education; education ongoing with staff Thank you for this referral. 
 
 Kenzie Carmona M.S. CCC-SLP/L Speech-Language Pathologist

## 2020-06-19 NOTE — CONSULTS
Palliative Medicine Consult DR[facility]: [phone] ([phone]) [facility] [phone] [facility]: [phone] Patient Name: Carloz Alvarenga YOB: 1929 Date of Initial Consult: 6/15/2020, follow up 6/16/2020, 6/17/2020, 6/18/2020, 6/19/2020 Reason for Consult: care decisions Requesting Provider: Wilfrido Acevedo and Gloria Zapata Primary Care Physician: Caty Toscano MD 
  
 SUMMARY:  
Carloz Alvarenga is a 80y.o. year old with a past history of atrial fib,hypertrophy of prostate with urinary obstruction due to hypertrophy of prostate, mild cognitive dysfunction , osteoporosis who was admitted on 6/12/2020 from home  with a diagnosis of fall about 2 weeks ago now with increased pain. Seen by otho, who suggested PT, non surgical approach. Palliative Medicine is consulted for goals of care. 6/19/2020 alerts to his name. Moaning when I walked into room. Told me his knee hurts. Discussed with BSRN. Will medicate for pain. Home later today with hospice services. Goals of care DNR/DNI  
 
6/18/2020  Patient a bit more talkative today, however still confused at times a bit agitated. Wife at bedside. Current plan is home with hospice tomorrow. Goals of care DNR/DNI  
 
6/17/2020 met with wife, hospice at bedside. Shared with wife at this point believe Mr Romulo Marie will be best serviced with hospice support at home. Highly encouraged her to explore hiring extra care givers for safety. DNR/DNI. 6/16/2020 seen x 2 today. This am responded to [facility], still confused. Agitated at times. Ate a bit of breakfast. Per RN not drinking. Met with wife this afternoon, He is C diff + wife aware. Not sure he is going to eat and drink enough to sustain himself for a long period of time. Wife does not want feeding tubes, I agree. Will place Hospice consult for information. Wife aware and agreeable PALLIATIVE DIAGNOSES:  
1. Goals of care 2. Closed compression fracture of lumber vertebra 3. Diarrhea 4. Delirium PLAN:  
1. 6/19/2020 lying in bed. Alerts to his name. Moaning tells me his knee hurts. He is still confused. Chronically ill appearing. Poor po continues. Plan is home with hospice support today. Discussed with BSRN will medicate for pain. Multiple discussions with his wife over the past few days, concerning level care. She wishes to bring him with hospice support. She has been encouraged to hire private care givers to help with his care. Goals of care DNR/ DNI home with hospice no feeding tubes. POST signed by wife is on chart. ( please see below for previous conversations) 2. 6/18/2020 Seen at bedside, taking a bit more orals but still doubt he will take enough over the long run to sustain himself. Appreciate speech and hospice assistance. Long conversations again with wife at bedside. Support offered. I have shared with wife, again today best care for Mr Chary Mohamud at this point is home with hospice support and hired care givers. Allow him to eat and drink what is pleasurable and there maybe times his appetite is more robust then others, but overall doubt he will eat enough to sustain for a long period of time. Hospice will offer a few sessions of PT at home, but again have shared I do not know if he will participate. She is aware his functional level has declined and not sure ( doubt ) it will return to his pre hospital state, which means he will be in a more bed bound state. Have recommended to keep the murcia cath, however if his cognition improves it can be removed by hospice at home. Current plan is home with hospice tomorrow once equipment is delivered. Have encouraged her to hire extra care givers for safety. Goals of care DNR/DNI limited while in hospital no feeding tubes. 3. 6/17/2020 Mr Jasmina Avila seen at bedside along with Ms Pierce Iggy present. Wife also present.  He remains confused, answers a few simple questions, but does not really follow any commands for me. Still tries to get of bed, but will re direct at times. He is not eating. Per RN no stools today. Long conversation with wife, hospice. Appreciate hospice assistance, liaison explained their service/ benefits. Shared with Ms Morrison Lefort, really feel at this juncture he will be best served with home hospice, and hired care givers. That would give him a chance to get home with her. Hospice will provide a few PT sessions, but I do not believe he will participate and shared this with wife. Stress and encourage the need to have extra care givers in the home for help. She wishes to consider the information. Difficult decision for this elderly wife. We offered support. Re address tomorrow with wife, hospice will also follow up. Goals of care DNR/DNI limited interventions for now, no feeding tubes. 4. 6/16/2020 seen x 2 today. He seems a bit more verbal to me then yesterday, however still confused now requiring soft restraints for safety. Per RN will take his meds, ate a bit of breakfast, doesn't drink fluids well. C diff + wife is aware. Per BSRN less episodes of loss stools. Spoke with wife again today. She is very worried about her , offered support. Honest but compassionate discussion, just not sure how he is going to do in the long term. I expressed my worry, concerned he will not eat and drink enough to sustain himself in the long term. Despite his functional level at home, worry he will no longer be able to obtain this level of functioning. Discussed placing a hospice consult for information of which she is agreeable with. PT/ ST on going. Will discuss choices with her tomorrow. Discussed with attending. Goals of care DNR/DNI, limited interventions, no feeding tubes 5. Goals of care Seen at bedside today along with Ms Lul Mancia.  Patient was not very responsive when seen this am, Seen again with wife present, remains confused wanting to get out of bed to use the restroom. He can name his wife, no conversation following with her or our team. Patient is not able to participate in his medical decisions. Spoke with his wife via phone and in person. Lengthy conversation on overall condition. She shares with us he has fallen in the past and required antibiotics for various infections but none sine December. Wife notes some cognitive dysfunction when at home, but able to attend to his own ADL's. She was able to bring us his AMD which was previously completed. Goals of care discussed at length including the benefits and burdens of resuscitation with advanced age. After thought and consideration wife changed goals of care to DNR/DNI. POST form introduced and discussed, signed by wife for DNR/DNI limited interventions, no feeding tubes. Patient and wife live together. They have 2 living children but both out of town and per wife have health issues of their own. If patient is not able to become more functional and ambulatory,wife will need assistance in caring for him. Discussed with CM. We did broach changing to a more comfort directed care plan if patient does not improve. We will continue to follow with you. 6. Acute on chronic fracture L1 ortho on board / non surgical approach. PT on board. Pain noted with rolling. Concerned about his functional level moving forward, did share this with his wife. 7. Diarrhea continue to have loose stools, wants to get up to use restroom. C diff pending 8. Delirium on Zyprexa 2.5 mg ? Contributing to his sleepiness. Was restless and trying to get of bed. Per wife mild cognitive dysfunction at baseline. 9. Initial consult note routed to primary continuity provider 10. Communicated plan of care with: Palliative IDT, wife, RN, CM Home with hospice tomorrow TREATMENT PREFERENCES:  
Code Status: DNR/DNI Advance Care Planning: [] The [facility] Interdisciplinary Team has updated the ACP Navigator with Health Care Agent and Patient Capacity Primary Decision [facility] ([address]):   Primary Decision Maker: Aries Hodgkin - [phone] Primary Decision Maker: Layla Winter - Other Relative - [phone] Secondary Decision Maker: Valdemar Leos - Other Relative - [phone] Medical Interventions: (home with hospice) Other Instructions: no feeding tubes Artificially Administered Nutrition: No feeding tube Other: As far as possible, the palliative care team has discussed with patient / health care proxy about goals of care / treatment preferences for patient. HISTORY:  
 
History obtained from: chart and wife CHIEF COMPLAINT: fall HPI/SUBJECTIVE: The patient is:  
[] Verbal and participatory [x] Non-participatory due to: confusion Please see summary Clinical Pain Assessment (nonverbal scale for nonverbal patients): Clinical Pain Assessment Severity: 2 Location: right knee Character: unknonw he is unable to describe Duration: unknown Effect: moans Factors: OA Frequency: couple times a day Activity (Movement): Lying quietly, normal position Duration: for how long has pt been experiencing pain (e.g., 2 days, 1 month, years) Frequency: how often pain is an issue (e.g., several times per day, once every few days, constant) FUNCTIONAL ASSESSMENT:  
 
Palliative Performance Scale (PPS): PPS: 30 
 
ECOG 
ECOG Status : Limited self-care PSYCHOSOCIAL/SPIRITUAL SCREENING:  
  
Any spiritual / Christian concerns: unable to assess for patient  
[] Yes /  [] No 
 
Caregiver Burnout: 
[] Yes /  [x] No /  [] No Caregiver Present Anticipatory grief assessment:  Unable to assess for patient  
[] Normal  / [] Maladaptive REVIEW OF SYSTEMS:  
 
Positive and pertinent negative findings in ROS are noted above in HPI. The following systems were [] reviewed / [x] unable to be reviewed as noted in HPI Other findings are noted below. Systems: constitutional, ears/nose/mouth/throat, respiratory, gastrointestinal, genitourinary, musculoskeletal, integumentary, neurologic, psychiatric, endocrine. Positive findings noted below. Modified ESAS Completed by: provider Fatigue: 8 Pain: 2 Anorexia: 6 Dyspnea: 0 Stool Occurrence(s): 1 PHYSICAL EXAM:  
 
Wt Readings from Last 3 Encounters:  
06/12/20 53.5 kg (118 lb) 03/06/20 54.9 kg (121 lb) 08/16/19 56.7 kg (125 lb) Blood pressure 109/67, pulse 86, temperature 97.1 °F (36.2 °C), resp. rate 18, height 5' 9\" (1.753 m), weight 53.5 kg (118 lb), SpO2 97 %. Pain: 
Pain Scale 1: Visual 
Pain Intensity 1: 2 Pain Location 1: Generalized Pain Description 1: Other (comment)(\"it hurts all over\") Pain Intervention(s) 1: Medication (see MAR) Last bowel movement: x 2 yesterday Constitutional: chronically ill appearing male who is alert moaning because of knee pain Respiratory: breathing not labored Musculoskeletal:  on right side Skin: warm, dry Neurologic: oriented to himself, could tell me his knee hurt, not really following commands HISTORY:  
 
Active Problems: 
  Spinal stenosis, lumbar (6/12/2020) Closed compression fracture of L3 vertebra ([address] (6/12/2020) Past Medical History:  
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  
Procedure Laterality Date  HX CORONARY ARTERY BYPASS GRAFT  3/12  HX HERNIA REPAIR    
 HX HIP REPLACEMENT   & '06  
 x 2  
 [facility] Family History Problem Relation Age of Onset  Cancer Mother  Emphysema Father History reviewed, no pertinent family history. Social History Tobacco Use  Smoking status: Former Smoker Last attempt to quit: 1990 Years since quittin.4  Smokeless tobacco: Never Used Substance Use Topics  Alcohol use: Yes Comment: social  
 
No Known Allergies Current Facility-Administered Medications Medication Dose Route Frequency  lactobacillus sp. 50 billion cpu (BIO-K PLUS) capsule 1 Cap  1 Cap Oral DAILY  ipratropium (ATROVENT) 0.02 % nebulizer solution 0.5 mg  0.5 mg Nebulization BID RT  
 arformoteroL (BROVANA) neb solution 15 mcg  15 mcg Nebulization BID RT  
 budesonide (PULMICORT) 500 mcg/2 ml nebulizer suspension  500 mcg Nebulization BID RT  
 QUEtiapine (SEROquel) tablet 12.5 mg  12.5 mg Oral DAILY  mirtazapine (REMERON SOL-TAB) disintegrating tablet 15 mg  15 mg Oral QHS  vancomycin (FIRVANQ) 50 mg/mL oral solution 125 mg  125 mg Oral Q6H  
 tamsulosin (FLOMAX) capsule 0.4 mg  0.4 mg Oral QHS  loperamide (IMODIUM) capsule 2 mg  2 mg Oral Q4H PRN  
 acetaminophen (TYLENOL) tablet 650 mg  650 mg Oral Q4H PRN  
 albuterol-ipratropium (DUO-NEB) 2.5 MG-0.5 MG/3 ML  3 mL Nebulization Q4H PRN  
 levothyroxine (SYNTHROID) tablet 25 mcg  25 mcg Oral 6am  
 therapeutic multivitamin (THERAGRAN) tablet 1 Tab  1 Tab Oral DAILY  atorvastatin (LIPITOR) tablet 10 mg  10 mg Oral QHS  HYDROcodone-acetaminophen (NORCO) 5-325 mg per tablet 1 Tab  1 Tab Oral Q4H PRN  
 
 
 LAB AND IMAGING FINDINGS:  
 
Lab Results Component Value Date/Time WBC 18.2 (H) 06/19/2020 06:10 AM  
 HGB 14.2 06/19/2020 06:10 AM  
 PLATELET 635 69/85/9039 06:10 AM  
 
Lab Results Component Value Date/Time Sodium 142 06/19/2020 06:10 AM  
 Potassium 3.7 06/19/2020 06:10 AM  
 Chloride 102 06/19/2020 06:10 AM  
 CO2 34 (H) 06/19/2020 06:10 AM  
 BUN 35 (H) 06/19/2020 06:10 AM  
 Creatinine 1.06 06/19/2020 06:10 AM  
 Calcium 8.8 06/19/2020 06:10 AM  
 Magnesium 2.3 06/19/2020 06:10 AM  
 Phosphorus 1.6 (L) 06/16/2020 03:27 AM  
  
Lab Results Component Value Date/Time Alk. phosphatase 193 (H) 06/12/2020 01:38 AM  
 Protein, total 7.2 06/12/2020 01:38 AM  
 Albumin 3.7 06/12/2020 01:38 AM  
 Globulin 3.5 06/12/2020 01:38 AM  
 
Lab Results Component Value Date/Time INR 1.4 (H) 06/15/2020 04:16 AM  
 Prothrombin time 16.5 (H) 06/15/2020 04:16 AM  
  
No results found for: IRON, FE, TIBC, IBCT, PSAT, FERR No results found for: PH, PCO2, PO2 No components found for: [place] Lab Results Component Value Date/Time  06/12/2020 01:38 AM  
 CK - MB 4.2 (H) 06/12/2020 01:38 AM  
  
 
   
 
Total time: 15 minutes Counseling / coordination time, spent as noted above: 10 minutes  
> 50% counseling / coordination: yes with RN, SUSAN Prolonged service was provided for  []30 min   []75 min in face to face time in the presence of the patient, spent as noted above. Time Start:   
Note: this can only be billed with 67572 (initial) or 65188 (follow up). If multiple start / stop times, list each separately.

## 2020-06-19 NOTE — PROGRESS NOTES
ADULT PROTOCOL: JET AEROSOL ASSESSMENT Patient  Trinh Harris     80 y.o.   male     6/18/2020  8:38 PM 
 
Breath Sounds Pre Procedure:  Breath Sounds Bilateral: Clear, Diminished Breath Sounds Post Procedure: Breath Sounds Bilateral: Clear, Diminished Breathing pattern: Pre procedure  Breathing Pattern: Regular Post procedure  Breathing Pattern: Regular Cough: Pre procedure  Cough: Non-productive Post procedure Cough: Non-productive Heart Rate: Pre procedure Pulse: 93 
         Post procedure Pulse: 95 Resp Rate: Pre procedure  Respirations: 18 Post procedure Nebulizer Therapy: Current medications Aerosolized Medications: Brovana, Pulmicort, Ipratropium bromide Problem List:  
Patient Active Problem List  
Diagnosis Code  Hypertrophy of prostate with urinary obstruction and other lower urinary tract symptoms (LUTS) N40.1     R07.9  Atrial flutter (HCC) I48.92  
 Incomplete bladder emptying R33.9  Prostate nodule N40.2  Erectile dysfunction N52.9  Actinic keratosis L57.0  Urinary urgency R39.15  
 UI (urinary incontinence) R32  
 OAB (overactive bladder) N32.81  Spinal stenosis, lumbar M48.061  Closed compression fracture of L3 vertebra ([facility]) S32.030A Patient alert and cooperative to use MDI: No 
 
Home Respiratory Therapy Regimen/Frequency:  YES Medication Device Frequency SEVERITY INDEX: 
 
ITEM 0 1 2 3 4 Score Respiratory Pattern and or Rate Regular 10-19 Regular 20-24  
24-30   
30-34 Severe SOB or  
Greater than 35 0 Breath Sounds Clear Occasional Wheeze Mild Wheezing Moderate Wheezing  wheezing/Absent breath sounds 0 Shortness of Breath None Dyspnea on Exertion Dyspnea at Rest Moderate Shortness of Breath at Rest Severe Shortness of Breath - Limited Speech 0 Total Score:  0 
 
* Scoring Guidelines 0-4 pts:  PRN-BID  
5-7 pts:  BID, TID, QID 
8-9 pts:  TID, QID, Q6 
10-12 pts:  Q4-Q6 * - Guidelines used with clinical judgement. PRN Treatments can be ordered to supplement scheduled treatments. Regardless of score, frequency should not be less than normal home regimen. Recommended Order/Frequency:  Atrovent to BID Comments:   
 
 
 
Respiratory Therapist: Yuridia Phillips RT

## 2020-06-19 NOTE — HOSPICE
Pt/family pursuing hospice:yes Admission dx approved by Hospice Medical Director: senile degeneration of the brain, not elsewhere classified. Anticipated hospital d/c date:6/19/20 Discussion occurred with patient and/or family about preference of hospitalization once admitted to hospice: yes Reviewed and discussed hospice philosophy and keeping the patient comfortable in their residence: yes (Document additional information below) Discussion with patient/family regarding around the clock caregiver in place due to patient safety in the home once discharged: yes (Document specifics discussed and/or any caregiver concerns identified). Narrative of events and/or assessment if applicable: DME (Johanna) Hospital Bed with gel overlay and full rails, bedside table, transport wc - conf # [id number] to be delivered between 10-12pm today. Transportation TBD. Wife is hiring private care, granddaughter will be coming to stay for a week to assist, and [facility] has [facility]. Brett Mobley RN [place] [address] [place], [facility] [address] 
[phone] Email: [email or web address]

## 2020-06-19 NOTE — PROGRESS NOTES
Received call from Arlette Gómez at [place] who conference in the Medical director there. Both said that there is no medical necessity for hospice. Told them that pts wife is also elderly and would be hiring caregivers and was just looking for some help covering some of those hours. They were adamant (bordering on argumentative) about him not qualifying for any home help. Let Melvinrosiemiah Eugenio from hospice know. She will be calling Gopal Fernández herself today. [address] RN - Outcomes Manager  [phone]

## 2020-06-19 NOTE — DISCHARGE SUMMARY
Discharge Summary Patient: Irvin Boeck               Sex: male          DOA: 6/12/2020 YOB: 1929      Age:  80 y.o.        LOS:  LOS: 7 days Admit Date: 6/12/2020 Discharge Date: 6/19/2020 Primary care physician: Mitzi Flanagan MD 
 
Discharge Diagnoses: 1. Mechanical fall 2. Acute on chronic Lumbar fracture from fall associate with severe osteoporosis 3. Severe osteoporosis 4. Steroid induced psychosis - off steroids. Resolving Metabolic encephalopathy superimposed on dementia with behavioral disturbance 5. Leukocytosis, steroid induced 6. Urinary retention - S/p Murcia cath with 500 ml urine out put - on flomax. 7.  Clostridium Difficile diarrhea 8. Hypophosphatemia replete as needed. 9.  Underweight Body mass index is 17.43 kg/m². Discharge Condition: Good Disposition: home with hospice Code Status: DNR Follow up for Primary Care Physician: 
1) he continues on vancomycin orally for his C. Diff diarrhea, please monitor for clinical improvement 2) he remains on murcia catheter, please consider urology follow up if needed. Hospital Course:  
80 y.o male with paroxysmal afib, HTN, thyroid disease, BPH with LUTS, severe osteoporosis presented with back pain and limited mobility. He sustained a fall while doing work in the yard 2 weeks prior. In the ER, workup showed close compression fracture of lumbar fracture and spine ortho was consulted who recommended palliative care with goal for hospice. He was given steroid while in ER for concern of spinal canal stenosis, he did not tolerated well which lead to psychosis episodes. This was resolved with steroid cessation. During this hospitalization, he had diarrhea which associated with C. Diff DNA positive. He was started on oral vancomycin which he tolerated well. Family has been accepted into hospice for home discharge Last 24 Hours: no overnight event. Has been preparing for home discharge with hospice. Diarrhea improved. Still has murcia in place for his urinary retention ROS: limited due but able to answer no current fever/chills, no headache, no dizziness, no sinus congestion, No chest pain, no palpitation, no shortness of breath, no abd pain, 
+ diarrhea, no urinary complaint, no leg pain or swelling VS:  
Visit Vitals /67 (BP 1 Location: Right arm, BP Patient Position: At rest) Pulse 86 Temp 97.1 °F (36.2 °C) Resp 18 Ht 5' 9\" (1.753 m) Wt 53.5 kg (118 lb) SpO2 97% BMI 17.43 kg/m² Tmax/24hrs: Temp (24hrs), Av °F (36.7 °C), Min:97.1 °F (36.2 °C), Max:98.9 °F (37.2 °C) Intake/Output Summary (Last 24 hours) at 2020 1003 Last data filed at 2020 8311 Gross per 24 hour Intake 75 ml Output 470 ml Net -395 ml Tele:  
General:  Cooperative, Not in acute distress, cachexia HEENT: PERRL, EOMI, supple neck, no JVD, dry oral mucosa Cardiovascular: S1S2 regular, no rub/gallop Pulmonary: air entry bilaterally, no wheezing, no crackle GI:  Soft, non tender, non distended, +bs, no guarding Extremities:  No pedal edema, +distal pulses appreciated Neuro: AOx2, moving all extremities Consults: 
Ortho spine: Dr. Deleon Reasons Significant Diagnostic Studies:  
CT Results (most recent): 
Results from Hospital Encounter encounter on 20 CT ABD PELV WO CONT Narrative CT abdomen and pelvis without contrast 
 
 
HISTORY: Fall a few weeks ago. Compression fractures to the lower back. Diarrhea 
and weakness. COMPARISON: CT abdomen 2006 TECHNIQUE: 5mm contiguous axial images from the lungs bases to the anal verge. No intravenous contrast was administered.  
 
All CT scans at this facility are performed using dose optimization technique as 
appropriate to a performed exam, to include automated exposure control, 
 adjustment of the MA and/or kV according to patient size (including appropriate 
matching for site-specific examinations) or use of  iterative reconstruction 
technique. ABDOMEN FINDINGS: Emphysema. Stable nodular density in the right lower lobe 
which is presumably benign. Post cardiac surgery changes. No basilar pleural or 
pericardial effusion. Stable cyst in the left hepatic lobe. There are 2 other hepatic hypodense 
lesions which are not visible without contrast. Punctate calcification may be 
within or adjacent to the gallbladder neck (axial image 22). Spleen, pancreas, 
adrenal glands and kidneys are unremarkable. No renal or ureteral calculi. No 
hydronephrosis. No dilated bowel loops. Moderate burden of stool in the colon. No pneumoperitoneum or ascites. Normal caliber aorta with atherosclerotic 
calcifications. PELVIC FINDINGS:  Partially obscured visualization of the pelvic structures due 
to extreme beam hardening artifact from the right hip prosthesis. Visualized 
portions of the urinary bladder look normal. No pelvic mass or free fluid. Osseous: L3 compression fracture resulting in 60% loss of vertebral body height. Retropulsion of the superior posterior vertebral corner into the spinal canal 
producing severe spinal canal stenosis. Right total hip arthroplasty. Multiple 
bilateral healing and healed rib fractures. Impression IMPRESSION: 
 
1. No acute intra-abdominal or intrapelvic abnormality. 2.  Stable benign right lower lobe pulmonary nodule. 3.  Stable left hepatic lobe cyst. The other 2 hepatic lesions are not visible. 4.  Punctate calcification could be a gallstone in the neck of the gallbladder 
versus an adjacent vascular calcification. 5.  60% L3 compression fracture with retropulsion causing severe spinal canal 
stenosis. Lab/Data Review: 
Labs: Results:  
   
Chemistry Recent Labs  
  06/19/20 
4617 *   
K 3.7  CO2 34* BUN 35* CREA 1.06  
CA 8.8 AGAP 6  
BUCR 33* CBC w/Diff Recent Labs  
  06/19/20 
0610 WBC 18.2*  
RBC 4.42* HGB 14.2 HCT 42.0  GRANS 82* LYMPH 1*  
EOS 0 Coagulation No results for input(s): PTP, INR, APTT, INREXT, INREXT in the last 72 hours. Iron/Ferritin No results for input(s): IRON in the last 72 hours. No lab exists for component: TIBCCALC BNP No results for input(s): BNPP in the last 72 hours. Cardiac Enzymes No results for input(s): CPK, CKND1, NABOR in the last 72 hours. No lab exists for component: [place] Liver Enzymes No results for input(s): TP, ALB, TBIL, AP in the last 72 hours. No lab exists for component: SGOT, GPT, DBIL Thyroid Studies No results for input(s): T4, T3U, TSH, TSHEXT, TSHEXT in the last 72 hours. No lab exists for component: T3RU All Micro Results Procedure Component Value Units Date/Time C. DIFFICILE AG & TOXIN A/B [[id number]]  (Abnormal) Collected:  06/14/20 1154 Order Status:  Completed Specimen:  Stool Updated:  06/16/20 1054 PCR Reflex See Reflex order for C. difficile (DNA) INTERPRETATION Indeterminate for Toxigenic C. difficile, DNA confirmation to follow. C. DIFFICILE (DNA) [[id number]]  (Abnormal) Collected:  06/14/20 1541 Order Status:  Completed Specimen:  Stool Updated:  06/16/20 1054  
  C. difficile (DNA) Positive Comment: This specimen is positive for toxigenic C difficile by DNA amplification. Repeat testing is not recommended, samples received within 7 days of this positive result will be rejected. Assay is not approved to test for cure, since nucleic acids may persist after effective treatment and may prompt false positive results. CULTURE, MRSA [[id number]] Collected:  06/12/20 1230 Order Status:  Completed Specimen:  Nasal from Nares Updated:  06/13/20 0855 Special Requests: NO SPECIAL REQUESTS Culture result: MRSA NOT PRESENT. Apparent Staphylococus aureus (not MRSA noted). Screening of patient nares for MRSA is for surveillance purposes and, if positive, to facilitate isolation considerations in high risk settings. It is not intended for automatic decolonization interventions per se as regimens are not sufficiently effective to warrant routine use. C. DIFFICILE AG & TOXIN A/B [[id number]] Collected:  06/12/20 0245 Order Status:  Canceled Specimen:  Stool Medications at discharge  including reasons for change and indications for new ones:  
Current Discharge Medication List  
  
START taking these medications Details HYDROcodone-acetaminophen (NORCO) 5-325 mg per tablet Take 1 Tab by mouth every eight (8) hours as needed for Pain for up to 3 days. Max Daily Amount: 3 Tabs. Indications: pain 
Qty: 9 Tab, Refills: 0 Associated Diagnoses: Closed compression fracture of third lumbar vertebra, sequela QUEtiapine (SEROquel) 25 mg tablet Take 1 Tab by mouth daily. Qty: 30 Tab, Refills: 0  
  
mirtazapine (REMERON SOL-TAB) 15 mg disintegrating tablet Take 1 Tab by mouth nightly. Indications: major depressive disorder 
Qty: 30 Tab, Refills: 0  
  
tamsulosin (FLOMAX) 0.4 mg capsule Take 1 Cap by mouth nightly. Qty: 30 Cap, Refills: 0  
  
vancomycin (FIRVANQ) 50 mg/mL oral solution Take 2.5 mL by mouth every six (6) hours for 8 days. Indications: diarrhea from an infection with Clostridium difficile bacteria Qty: 80 mL, Refills: 0  
  
lactobacillus sp. 50 billion cpu (BIO-K PLUS) 50 billion cell -375 mg cap capsule Take 1 Cap by mouth daily. Qty: 15 Cap, Refills: 0 CONTINUE these medications which have NOT CHANGED Details  
acetaminophen (TYLENOL) 325 mg tablet Take 2 Tabs by mouth every four (4) hours as needed for Pain. Qty: 20 Tab, Refills: 0  
  
levothyroxine (SYNTHROID) 25 mcg tablet Take 25 mcg by mouth. SPIRIVA RESPIMAT 2.5 mcg/actuation inhaler   
  
albuterol (PROVENTIL HFA, VENTOLIN HFA, PROAIR HFA) 90 mcg/actuation inhaler Take 2 Puffs by inhalation. fluticasone propion-salmeterol (ADVAIR HFA) 115-21 mcg/actuation inhaler Take 2 Puffs by inhalation two (2) times a day. multivitamin (ONE A DAY) tablet Take 1 Tab by mouth daily. STOP taking these medications  
  
 traMADoL (ULTRAM) 50 mg tablet Comments:  
Reason for Stopping:   
   
 simvastatin (ZOCOR) 20 mg tablet Comments:  
Reason for Stopping:   
   
 cyclobenzaprine (FLEXERIL) 5 mg tablet Comments:  
Reason for Stopping:   
   
 alendronate (FOSAMAX) 70 mg tablet Comments:  
Reason for Stopping:   
   
 furosemide (LASIX PO) Comments:  
Reason for Stopping:   
   
  
 
       
 
Pending laboratory work and tests: none Activity: Activity as tolerated, fall precaution Nuñez care. Diet: Comfort feeding Wound Care: Keep wound clean and dry Jackie Lynch MD 
6/19/2020 
10:02 AM

## 2020-06-19 NOTE — PROGRESS NOTES
Patient is not available to be assessed at this time. Patient was sleeping/resting at time of 's visit. [address] [facility]  
[phone]

## 2020-06-19 NOTE — PROGRESS NOTES
Problem: Risk for Spread of Infection Goal: Prevent transmission of infectious organism to others Description: Prevent the transmission of infectious organisms to other patients, staff members, and visitors. Outcome: Progressing Towards Goal 
  
Problem: Patient Education:  Go to Education Activity Goal: Patient/Family Education Outcome: Progressing Towards Goal 
  
Problem: Pressure Injury - Risk of 
Goal: *Prevention of pressure injury Description: Document Anthony Scale and appropriate interventions in the flowsheet. Outcome: Progressing Towards Goal 
Note: Pressure Injury Interventions: 
Sensory Interventions: Assess changes in LOC, Assess need for specialty bed, Avoid rigorous massage over bony prominences, Check visual cues for pain, Discuss PT/OT consult with provider, Float heels, Keep linens dry and wrinkle-free, Maintain/enhance activity level, Minimize linen layers, Monitor skin under medical devices, Pad between skin to skin, Pressure redistribution bed/mattress (bed type), Turn and reposition approx. every two hours (pillows and wedges if needed), Use 30-degree side-lying position Moisture Interventions: Absorbent underpads, Apply protective barrier, creams and emollients, Assess need for specialty bed, Check for incontinence Q2 hours and as needed, Contain wound drainage, Internal/External urinary devices, Limit adult briefs, Maintain skin hydration (lotion/cream), Minimize layers, Offer toileting Q_hr, Moisture barrier Activity Interventions: Assess need for specialty bed, Pressure redistribution bed/mattress(bed type), PT/OT evaluation Mobility Interventions: Assess need for specialty bed, Float heels, HOB 30 degrees or less, Pressure redistribution bed/mattress (bed type), PT/OT evaluation, Turn and reposition approx. every two hours(pillow and wedges) Nutrition Interventions: Document food/fluid/supplement intake, Discuss nutritional consult with provider, Offer support with meals,snacks and hydration Friction and Shear Interventions: Apply protective barrier, creams and emollients, Feet elevated on foot rest, Foam dressings/transparent film/skin sealants, HOB 30 degrees or less, Lift sheet, Lift team/patient mobility team, Minimize layers, Transferring/repositioning devices Problem: Patient Education: Go to Patient Education Activity Goal: Patient/Family Education Outcome: Progressing Towards Goal 
  
Problem: Falls - Risk of 
Goal: *Absence of Falls Description: Document Ricky Denny Fall Risk and appropriate interventions in the flowsheet. Outcome: Progressing Towards Goal 
Note: Fall Risk Interventions: 
Mobility Interventions: Assess mobility with egress test, Bed/chair exit alarm, Communicate number of staff needed for ambulation/transfer, OT consult for ADLs, Patient to call before getting OOB, PT Consult for mobility concerns, PT Consult for assist device competence, Strengthening exercises (ROM-active/passive) Mentation Interventions: Adequate sleep, hydration, pain control, Bed/chair exit alarm, Door open when patient unattended, Evaluate medications/consider consulting pharmacy, Eyeglasses and hearing aids, Familiar objects from home, More frequent rounding, Increase mobility, Reorient patient, Room close to nurse's station, Toileting rounds, Update white board Medication Interventions: Assess postural VS orthostatic hypotension, Bed/chair exit alarm, Evaluate medications/consider consulting pharmacy Elimination Interventions: Bed/chair exit alarm, Call light in reach, Toileting schedule/hourly rounds History of Falls Interventions: Bed/chair exit alarm, Consult care management for discharge planning, Door open when patient unattended, Evaluate medications/consider consulting pharmacy, Investigate reason for fall, Room close to nurse's station, Vital signs minimum Q4HRs X 24 hrs (comment for end date), Assess for delayed presentation/identification of injury for 48 hrs (comment for end date) Problem: Patient Education: Go to Patient Education Activity Goal: Patient/Family Education Outcome: Progressing Towards Goal 
  
Problem: Nutrition Deficit Goal: *Optimize nutritional status Outcome: Progressing Towards Goal 
  
Problem: Pain Goal: *Control of Pain Outcome: Progressing Towards Goal 
Goal: *PALLIATIVE CARE:  Alleviation of Pain Outcome: Progressing Towards Goal 
  
Problem: Patient Education: Go to Patient Education Activity Goal: Patient/Family Education Outcome: Progressing Towards Goal 
  
Problem: Patient Education: Go to Patient Education Activity Goal: Patient/Family Education Outcome: Progressing Towards Goal 
  
Problem: Patient Education: Go to Patient Education Activity Goal: Patient/Family Education Outcome: Progressing Towards Goal 
  
Problem: Patient Education: Go to Patient Education Activity Goal: Patient/Family Education Outcome: Progressing Towards Goal

## 2020-06-19 NOTE — PROGRESS NOTES
Discharge order received and reviewed. Pt to return home today with hospice. [employer] [facility] will be managing care; equipment is being delivered this morning. Transportation has been arranged through [employer] for [address] wife and let her know; also told her I would have his scripts filled by the pharmacy here and brought home with the patient. She voiced her appreciation and understanding. Faxed new medicare approved hospice diagnosis to Jagjit Méndez at the [address] for reconsideration of home care support for wife. Left vm message asking her to call me back. Mikey Taylor RN - Outcomes Manager  [phone]

## 2020-06-19 NOTE — DISCHARGE INSTRUCTIONS
Discharge Instructions    Patient: Saurabh Wild MRN: [id number]  CSN: [id number]    YOB: 1929  Age: 80 y.o. Sex: male    DOA: 6/12/2020 LOS:  LOS: 7 days   Discharge Date:      ACUTE DIAGNOSES:    1. Mechanical fall  2. Acute on chronic L1 fracture from fall associate with severe osteoporosis   3. Severe osteoporosis   4. Steroid induced psychosis - off steroids. Resolving       Metabolic encephalopathy superimposed on dementia with behavioral disturbance  5. Leukocytosis, steroid induced   6. Urinary retention - S/p Nuñez cath with 500 ml urine out put - on flomax. 7.  Clostridium Difficile diarrhea   8. Hypophosphatemia replete as needed. 9.  Underweight Body mass index is 17.43 kg/m². DISCHARGE MEDICATIONS:         · It is important that you take the medication exactly as they are prescribed. · Keep your medication in the bottles provided by the pharmacist and keep a list of the medication names, dosages, and times to be taken in your wallet. · Do not take other medications without consulting your doctor. DIET:  Dysphagia diet-pureed, continue Ensure supplement with meals     ACTIVITY: Activity as tolerated, fall precaution   Continue on hospice care with 24-hour care     ADDITIONAL INFORMATION: If you experience any of the following symptoms then please call your primary care physician or return to the emergency room if you cannot get hold of your doctor: Fever, chills, nausea, vomiting, diarrhea, change in mentation, falling, bleeding, shortness of breath. FOLLOW UP CARE:  Dr. Bina Wade MD  you are to call and set up an appointment to see them in 1-2 weeks. Follow-up with Hospice team for further care      Information obtained by :  I understand that if any problems occur once I am at home I am to contact my physician. I understand and acknowledge receipt of the instructions indicated above. Physician's or R.N.'s Signature                                                                  Date/Time                                                                                                                                              Patient or Representative Signature                                                          Date/Time    Barbara Crow MD  6/19/2020  9:56 AM

## 2020-06-20 NOTE — PROGRESS NOTES
Prescriptions filled at out patient Pharmacy and sent home with patient. Patient taken home via [facility]. Arm bands removed and shredded per protocol. Patient in stable condition

## 2020-06-20 NOTE — PROGRESS NOTES
Patient being discharged to home with palliative care. Instructions printed out and provided for family. Patient confused and unable to sign. Belongings packed up.

## 2020-06-20 NOTE — PROGRESS NOTES
Patient moaning out as if in pain and became combative. Patient refused to take medications and pulling off foam dressings to legs and pulling gown off. Patient said to just leave him alone. Patient refused pain medication and spit pills back out of mouth. Continued to reorient patient. Patient refused vital signs and lunch tray.

## 2020-06-23 PROBLEM — Z51.5 HOSPICE CARE: Status: ACTIVE | Noted: 2020-06-23

## 2022-01-25 NOTE — ED PROVIDER NOTES
HPI Comments: Iraida Avila is a 80 y.o. male with a pertinent history of Afib, BPH, [place] who presents via EMS to the emergency department for evaluation of bleeding varicose vein which occurred just pta. Pt states he noticed it bleeding when he was getting ready for bed. No injury or trauma. He is not on any blood thinners. This has never happened to him before. EMS applied pressure dressing to wound, which has controlled the bleeding. Pt denies any fevers or chills, headache, dizziness or light headedness, ENT issues, CP or discomfort, SOB, cough, n/v/d/c, abd pain, back pain, diaphoresis, melena/hematochezia, dysuria, hematuria, frequency, focal weakness/numbness/tingling, or rash. Patient has no other complaints at this time.     PCP:  Aguilar Rdz MD           Past Medical History:   Diagnosis Date    Actinic keratosis     Arthropathy, unspecified, site unspecified     Atrial fibrillation ([facility] [address]     Cardiac arrhythmia     Elevated prostate specific antigen (PSA)     Hearing reduced     Heart disease     Hemorrhoid     Hypertrophy of prostate with urinary obstruction and other lower urinary tract symptoms (LUTS)     Impotence of organic origin     Leg pain     Malignant neoplasm of skin of face 11/19/2007    Other malignant neoplasm without specification of site [facility])     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    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 Pt repositioned for comfort.    status:      Spouse name: N/A    Number of children: N/A    Years of education: N/A     Occupational History    Retired Other          Social History Main Topics    Smoking status: Former Smoker     Quit date: 1/1/1990    Smokeless tobacco: Never Used    Alcohol use Yes      Comment: social    Drug use: No    Sexual activity: No     Other Topics Concern    Not on file     Social History Narrative    No narrative on file         ALLERGIES: Review of patient's allergies indicates no known allergies. Review of Systems   Constitutional: Negative for chills and fever. HENT: Negative for congestion, rhinorrhea and sore throat. Respiratory: Negative for cough and shortness of breath. Cardiovascular: Negative for chest pain. Gastrointestinal: Negative for abdominal pain, blood in stool, constipation, diarrhea, nausea and vomiting. Genitourinary: Negative for dysuria, frequency and hematuria. Musculoskeletal: Negative for back pain and myalgias. Skin: Positive for wound. Negative for rash. Neurological: Negative for dizziness and headaches. Vitals:    08/31/17 2245   BP: 113/58   Temp: 97.4 °F (36.3 °C)   SpO2: 99%            Physical Exam   Constitutional: He is oriented to person, place, and time. He appears well-developed and well-nourished. No distress. HENT:   Head: Normocephalic and atraumatic. Eyes: Conjunctivae are normal.   Neck: Normal range of motion. Neck supple. Cardiovascular: Normal rate, regular rhythm and normal heart sounds. Pulmonary/Chest: Effort normal and breath sounds normal. No respiratory distress. He exhibits no tenderness. Musculoskeletal: Normal range of motion. He exhibits no edema or deformity. Bleeding wound noted to back of right ankle. No pulsatile flow. Intact distal pulses with normal sensation. Neurological: He is alert and oriented to person, place, and time. Skin: Skin is warm and dry. He is not diaphoretic. Psychiatric: He has a normal mood and affect. Nursing note and vitals reviewed. MDM  Number of Diagnoses or Management Options  Bleeding from varicose vein, right: new and requires workup  Diagnosis management comments: differential diagnosis:  Bleeding varicosity, laceration, abrasion, puncture wound    Plan:  Pt presents in NAD, vitals wnl. Placed sutures to control bleeding. At this time, patient is stable and appropriate for discharge home. Patient demonstrates understanding of current diagnoses and is in agreement with the treatment plan. They are advised that while the likelihood of serious underlying condition is low at this point given the evaluation performed today, we cannot fully rule it out. They are advised to immediately return with any new symptoms or worsening of current condition. All questions have been answered. Patient is given educational material regarding their diagnoses, including danger symptoms and when to return to the ED. Follow-up with PCP. Amount and/or Complexity of Data Reviewed  Review and summarize past medical records: yes    Risk of Complications, Morbidity, and/or Mortality  Presenting problems: moderate  Diagnostic procedures: minimal  Management options: moderate    Patient Progress  Patient progress: improved    ED Course       Other Procedure  Date/Time: 9/1/2017 1:48 AM  Performed by: Kristen Bolton  Authorized by: Kristen Bolton     Consent:     Consent obtained:  Verbal    Consent given by:  Patient    Risks discussed:  Bleeding, infection and pain    Alternatives discussed:  No treatment  Indications:     Indications:  Bleeding varicose vein  Pre-procedure details:     Skin preparation:  Betadine    Preparation: Patient was prepped and draped in the usual sterile fashion    Anesthesia (see MAR for exact dosages):      Anesthesia method:  Local infiltration    Local anesthetic:  Lidocaine 1% WITH epi  Post-procedure details:     Patient tolerance of procedure: Tolerated well, no immediate complications  Comments:      Placed 2 nylon sutures over bleeding varicose vein with hemostasis achieved.          -------------------------------------------------------------------------------------------------------------------  Orders:  Orders Placed This Encounter    OTHER PROCEDURE (ASAP ONLY)     This order was created via procedure documentation     Standing Status:   Standing     Number of Occurrences:   1    lidocaine-EPINEPHrine (XYLOCAINE) 1 %-1:100,000 injection 100 mg      Progress Notes:  1:18 AM:  Ishan Sullivan PA-C was at the pt's bedside, assessed the pt and answered the pt's questions regarding treatment.    -------------------------------------------------------------------------------------------------------------------    Disposition:  Diagnosis:   1. Bleeding from varicose vein, right        Disposition: HI Home    Follow-up Information     Follow up With Details Comments Contact Info    Lisandro Dawn MD Call in 2 days As needed, For wound re-check [address] "[employer] [address] [address] Terrence Dawn MD Go in 7 days For suture removal [address] "[employer] [address] [address] [facility] EMERGENCY DEPT   [address]  [phone]          Patient's Medications   Start Taking    No medications on file   Continue Taking    ALENDRONATE-VITAMIN D3 70-2,800 MG-UNIT PER TABLET    Take 1 Tab by mouth every seven (7) days. ASPIRIN 81 MG TABLET    Take 81 mg by mouth. MULTIVITAMIN (ONE A DAY) TABLET    Take 1 Tab by mouth daily. SIMVASTATIN (ZOCOR) 20 MG TABLET    Take  by mouth nightly.      These Medications have changed    No medications on file   Stop Taking    No medications on file

## 2022-01-25 NOTE — ROUTINE PROCESS
[id number] -[facility]  Bed ordered.
Bedside and Verbal shift change report given to Jean Serrano (oncoming nurse) by Yeimy Johnson RN 
 (offgoing nurse). Report included the following information SBAR, Kardex, Intake/Output, MAR and Recent Results.
Bedside shift change report given to [address] [place] [address] [address] (oncoming nurse) by Geovanni Chun (offgoing nurse). Report included the following information SBAR.
Bedside shift change report given to Hamilton Hutchinson RN (oncoming nurse) by Gareth Astorga RN (offgoing nurse). Report included the following information SBAR, Kardex, Intake/Output, MAR, Recent Results and Med Rec Status.
Called wife to assist in completing admission database. Wife stated that she is very concerned because she needs to be here due to his dementia, and that she has a call in to patient advocacy. I assured her that her  was currently calm and cooperative and we will keep her updated on his status.
Received report from [facility]. Pt awake, alert, confused, NAD, breathing non labored, on room air, HOB up. Bed at the lowest level on lock position, call bell w/i reach. Bed alarm on. Bedside and Verbal shift change report given to [facility] (oncoming nurse) by me (offgoing nurse). Report included the following information SBAR, Kardex, Intake/Output, MAR and Recent Results.
Received report from [facility]. Pt sleeping, easily awakened, confused, NAD, breathing non labored, on room air, HOB up. No PIV. Nuñez cath in place. Bed at the lowest level on lock position, call bell w/i reach. Sitter a the bedside. Bedside and Verbal shift change report given to CASIE Sosa (oncoming nurse) by me (offgoing nurse). Report included the following information SBAR, Kardex, Intake/Output and Recent Results.
Received report from [facility]. Pt AAOx1-self only, NAD, breathing non labored, on room air, HOB up. Nuñez cath in place. Bed at the lowest level on lock position, call bell w/i reach. Sitter at the bedside. Bed alarm on. Bedside and Verbal shift change report given to [facility] (oncoming nurse) by me (offgoing nurse). Report included the following information SBAR, Kardex, Intake/Output, MAR and Recent Results.
Received report from [facility]. Pt asleep, moans on tactile stimuli or when name called, NAD, breathing non labored, on room air, HOB up. Bed at the lowest level on lock position, call bell w/i reach. Bed alarm on. Bedside and Verbal shift change report given to CASIE Sung (oncoming nurse) by me (offgoing nurse). Report included the following information SBAR, Kardex, Intake/Output, MAR and Recent Results.
Received report from [facility]. Pt alert, confused, restless, NAD, breathing non labored, on room air, HOB up. No PIV. Sitter at the bedside. Bed at the lowest level on lock position, call bell w/i reach. Bedside and Verbal shift change report given to CASIE Sosa (oncoming nurse) by me (offgoing nurse). Report included the following information SBAR, Kardex, Intake/Output, MAR and Recent Results.
Speciality bed ordered. [id number].
TRANSFER - IN REPORT: 
 
Verbal report received from Roman Cristiano, RN(name) on Lisbeth Blade  being received from Emergency Department(unit) for routine progression of care. Report consisted of patients Situation, Background, Assessment and  
Recommendations(SBAR). Information from the following report(s) SBAR, Intake/Output and Med Rec Status was reviewed with the receiving nurse. Opportunity for questions and clarification was provided. Patient is baseline AO to self only. Wife is currently at bedside. Requested that Roman Quinonez obtain a code status for the patient from the wife and enter it into the chart prior to sending him to the unit. Assessment completed upon patients arrival to unit and care assumed.
Wife called to speak with patient. Transferred her call into his room and assisted him in answering the phone. Patient told his wife, \"I am in misery and cannot take much more. \"  Patient has been medicated with pain medication.
Detail Level: Zone
Other (Free Text): Treatment #3: The treated area was degreased with pre-peel cleanser, and vaseline was applied for protection of mucous membranes. Prior to the procedure, written consent was obtained and risks were reviewed, including but not limited to: redness, peeling, blistering, pigmentary change, scarring, infection, and pain. Patient is aware multiple treatments may be necessary to achieve the desired outcome. A TCA CROSS scar revision with 100% TCA was performed to one scar of the L upper eyelid region. Scar frosting was 0. Post-peel erythema was mild. After the procedure, a post-peel cream was applied. Sun protection and post-care instructions were reviewed with the patient.
Price (Use Numbers Only, No Special Characters Or $): 50

## 2024-02-12 NOTE — ED TRIAGE NOTES
Pt arrived to ED via EMS. Per EMS, pt states that he was pulling his sock down when a blood vessel burst on his lower right ankle. Medics applied pressure dressing to area to control bleeding. PT is alert and oriented at this time. Subclavian vein accessedx3